# Patient Record
Sex: MALE | Race: BLACK OR AFRICAN AMERICAN | NOT HISPANIC OR LATINO | Employment: UNEMPLOYED | URBAN - METROPOLITAN AREA
[De-identification: names, ages, dates, MRNs, and addresses within clinical notes are randomized per-mention and may not be internally consistent; named-entity substitution may affect disease eponyms.]

---

## 2017-02-14 ENCOUNTER — OFFICE VISIT (OUTPATIENT)
Dept: FAMILY MEDICINE | Facility: CLINIC | Age: 37
End: 2017-02-14
Payer: COMMERCIAL

## 2017-02-14 ENCOUNTER — LAB VISIT (OUTPATIENT)
Dept: LAB | Facility: HOSPITAL | Age: 37
End: 2017-02-14
Attending: INTERNAL MEDICINE
Payer: COMMERCIAL

## 2017-02-14 VITALS
TEMPERATURE: 97 F | SYSTOLIC BLOOD PRESSURE: 130 MMHG | HEIGHT: 68 IN | BODY MASS INDEX: 34.01 KG/M2 | HEART RATE: 100 BPM | WEIGHT: 224.44 LBS | DIASTOLIC BLOOD PRESSURE: 82 MMHG | OXYGEN SATURATION: 98 % | RESPIRATION RATE: 18 BRPM

## 2017-02-14 DIAGNOSIS — B00.9 HSV-2 INFECTION: ICD-10-CM

## 2017-02-14 DIAGNOSIS — Z00.00 ROUTINE ADULT HEALTH MAINTENANCE: ICD-10-CM

## 2017-02-14 DIAGNOSIS — R20.0 BILATERAL HAND NUMBNESS: Primary | ICD-10-CM

## 2017-02-14 DIAGNOSIS — R20.0 BILATERAL HAND NUMBNESS: ICD-10-CM

## 2017-02-14 LAB
ABO + RH BLD: NORMAL
ALBUMIN SERPL BCP-MCNC: 4 G/DL
ALP SERPL-CCNC: 75 U/L
ALT SERPL W/O P-5'-P-CCNC: 24 U/L
ANION GAP SERPL CALC-SCNC: 4 MMOL/L
AST SERPL-CCNC: 30 U/L
BASOPHILS # BLD AUTO: 0.01 K/UL
BASOPHILS NFR BLD: 0.2 %
BILIRUB SERPL-MCNC: 0.4 MG/DL
BLD GP AB SCN CELLS X3 SERPL QL: NORMAL
BUN SERPL-MCNC: 22 MG/DL
CALCIUM SERPL-MCNC: 9.1 MG/DL
CHLORIDE SERPL-SCNC: 108 MMOL/L
CHOLEST/HDLC SERPL: 4.6 {RATIO}
CO2 SERPL-SCNC: 28 MMOL/L
CREAT SERPL-MCNC: 1.3 MG/DL
DIFFERENTIAL METHOD: ABNORMAL
EOSINOPHIL # BLD AUTO: 0.1 K/UL
EOSINOPHIL NFR BLD: 1.9 %
ERYTHROCYTE [DISTWIDTH] IN BLOOD BY AUTOMATED COUNT: 13.4 %
EST. GFR  (AFRICAN AMERICAN): >60 ML/MIN/1.73 M^2
EST. GFR  (NON AFRICAN AMERICAN): >60 ML/MIN/1.73 M^2
GLUCOSE SERPL-MCNC: 101 MG/DL
HCT VFR BLD AUTO: 38.4 %
HDL/CHOLESTEROL RATIO: 21.8 %
HDLC SERPL-MCNC: 193 MG/DL
HDLC SERPL-MCNC: 42 MG/DL
HGB BLD-MCNC: 13.1 G/DL
LDLC SERPL CALC-MCNC: 97.8 MG/DL
LYMPHOCYTES # BLD AUTO: 2.5 K/UL
LYMPHOCYTES NFR BLD: 46.4 %
MCH RBC QN AUTO: 30 PG
MCHC RBC AUTO-ENTMCNC: 34.1 %
MCV RBC AUTO: 88 FL
MONOCYTES # BLD AUTO: 0.7 K/UL
MONOCYTES NFR BLD: 13.5 %
NEUTROPHILS # BLD AUTO: 2 K/UL
NEUTROPHILS NFR BLD: 37.8 %
NONHDLC SERPL-MCNC: 151 MG/DL
PLATELET # BLD AUTO: 213 K/UL
PMV BLD AUTO: 12.3 FL
POTASSIUM SERPL-SCNC: 4 MMOL/L
PROT SERPL-MCNC: 7.8 G/DL
RBC # BLD AUTO: 4.37 M/UL
SODIUM SERPL-SCNC: 140 MMOL/L
TRIGL SERPL-MCNC: 266 MG/DL
WBC # BLD AUTO: 5.39 K/UL

## 2017-02-14 PROCEDURE — 83036 HEMOGLOBIN GLYCOSYLATED A1C: CPT

## 2017-02-14 PROCEDURE — 36415 COLL VENOUS BLD VENIPUNCTURE: CPT | Mod: PO

## 2017-02-14 PROCEDURE — 99395 PREV VISIT EST AGE 18-39: CPT | Mod: S$GLB,,, | Performed by: INTERNAL MEDICINE

## 2017-02-14 PROCEDURE — 86900 BLOOD TYPING SEROLOGIC ABO: CPT

## 2017-02-14 PROCEDURE — 80061 LIPID PANEL: CPT

## 2017-02-14 PROCEDURE — 80053 COMPREHEN METABOLIC PANEL: CPT

## 2017-02-14 PROCEDURE — 85025 COMPLETE CBC W/AUTO DIFF WBC: CPT

## 2017-02-14 PROCEDURE — 99999 PR PBB SHADOW E&M-EST. PATIENT-LVL III: CPT | Mod: PBBFAC,,, | Performed by: INTERNAL MEDICINE

## 2017-02-14 PROCEDURE — 86901 BLOOD TYPING SEROLOGIC RH(D): CPT

## 2017-02-14 PROCEDURE — 86703 HIV-1/HIV-2 1 RESULT ANTBDY: CPT

## 2017-02-14 RX ORDER — VALACYCLOVIR HYDROCHLORIDE 1 G/1
1000 TABLET, FILM COATED ORAL DAILY PRN
Qty: 30 TABLET | Refills: 2 | Status: SHIPPED | OUTPATIENT
Start: 2017-02-14 | End: 2017-03-29

## 2017-02-14 RX ORDER — MELOXICAM 15 MG/1
15 TABLET ORAL DAILY PRN
Qty: 30 TABLET | Refills: 0 | Status: SHIPPED | OUTPATIENT
Start: 2017-02-14 | End: 2017-03-29

## 2017-02-14 NOTE — PROGRESS NOTES
Subjective:       Patient ID: Sukumar Bob is a 36 y.o. male.    Chief Complaint: Annual Exam  -----physical---recurrence of bilat hand numbness---------works at plant using hands-------in turn around now---------  HPI  Review of Systems   Constitutional: Negative for activity change, appetite change, chills, diaphoresis, fatigue, fever and unexpected weight change.   HENT: Negative for drooling, ear discharge, ear pain, facial swelling, hearing loss, mouth sores, nosebleeds, postnasal drip, rhinorrhea, sinus pressure, sneezing, sore throat, tinnitus, trouble swallowing and voice change.    Eyes: Negative for photophobia, redness and visual disturbance.   Respiratory: Negative for apnea, cough, choking, chest tightness, shortness of breath and wheezing.    Cardiovascular: Negative for chest pain, palpitations and leg swelling.   Gastrointestinal: Negative for abdominal distention, abdominal pain, anal bleeding, blood in stool, constipation, diarrhea, nausea, rectal pain and vomiting.   Endocrine: Negative for cold intolerance, heat intolerance, polydipsia, polyphagia and polyuria.   Genitourinary: Negative for difficulty urinating, dysuria, enuresis, flank pain, frequency, genital sores, hematuria and urgency.   Musculoskeletal: Negative for arthralgias, back pain, gait problem, joint swelling, myalgias, neck pain and neck stiffness.   Skin: Negative for color change, pallor, rash and wound.   Allergic/Immunologic: Negative for food allergies and immunocompromised state.   Neurological: Negative for dizziness, tremors, seizures, syncope, facial asymmetry, speech difficulty, weakness, light-headedness and headaches.   Hematological: Negative for adenopathy. Does not bruise/bleed easily.   Psychiatric/Behavioral: Negative for agitation, behavioral problems, confusion, decreased concentration, dysphoric mood, hallucinations, self-injury, sleep disturbance and suicidal ideas. The patient is not  nervous/anxious and is not hyperactive.        Objective:      Physical Exam   Constitutional: He is oriented to person, place, and time. He appears well-developed and well-nourished. No distress.   HENT:   Head: Normocephalic and atraumatic.   Eyes: Pupils are equal, round, and reactive to light. No scleral icterus.   Neck: Normal range of motion. Neck supple. No JVD present. Carotid bruit is not present. No tracheal deviation present. No thyromegaly present.   Cardiovascular: Normal rate, regular rhythm, normal heart sounds and intact distal pulses.    Pulmonary/Chest: Effort normal and breath sounds normal. No respiratory distress. He has no wheezes. He has no rales. He exhibits no tenderness.   Abdominal: Soft. Bowel sounds are normal. He exhibits no distension. There is no tenderness. There is no rebound and no guarding.   Musculoskeletal: Normal range of motion. He exhibits no edema or tenderness.   Lymphadenopathy:     He has no cervical adenopathy.   Neurological: He is alert and oriented to person, place, and time.   Skin: Skin is warm and dry. No rash noted. He is not diaphoretic. No erythema. No pallor.   Psychiatric: He has a normal mood and affect. His behavior is normal. Judgment and thought content normal.   Nursing note and vitals reviewed.      Assessment:       1. Bilateral hand numbness    2. HSV-2 infection    3. Routine adult health maintenance        Plan:        mobic 15 mg a day, bilat wrist splints-------call if persists-------consider emg.                 Watch diet,exercise.          Notes/labs reviewed.              Check cmp,cbc,lipids,hga1c,hiv.           F/u prn or 6 months.

## 2017-02-14 NOTE — MR AVS SNAPSHOT
[facility]  [address]  [place] [address]  Phone: [phone]                  Sukumar Bob   2017 4:00 PM   Office Visit    Description:  Male : 1980   Provider:  Jet Jefferson MD   Department:  [facility]           Reason for Visit     Annual Exam           Diagnoses this Visit        Comments    Bilateral hand numbness    -  Primary     HSV-2 infection         Routine adult health maintenance                To Do List           Future Appointments        Provider Department Dept Phone    2017 4:30 PM Jet Jefferson MD [facility] [phone]      Goals (5 Years of Data)     None      Follow-Up and Disposition     Return in about 6 months (around 2017).       These Medications        Disp Refills Start End    meloxicam (MOBIC) 15 MG tablet 30 tablet 0 2017     Take 1 tablet (15 mg total) by mouth daily as needed for Pain. - Oral    Pharmacy: [facility]/pharmacy #[id number] - [place], [place] - [address] [address] Ph #: [phone]       valacyclovir (VALTREX) 1000 MG tablet 30 tablet 2 2017     Take 1 tablet (1,000 mg total) by mouth daily as needed. - Oral    Pharmacy: [facility]/pharmacy #[id number] - [place] - [address] [address] Ph #: [phone]         [facility]     [facility] On Call Nurse Care Line -  Assistance  Registered nurses in the [facility] provide clinical advisement, health education, appointment booking, and other advisory services.  Call for this free service at [phone].             Medications           Message regarding Medications     Verify the changes and/or additions to your medication regime listed below are the same as discussed with your clinician today.  If any of these changes or additions are incorrect, please notify your healthcare provider.        CHANGE how you are taking these medications     Start Taking Instead of  "   meloxicam (MOBIC) 15 MG tablet meloxicam (MOBIC) 15 MG tablet    Dosage:  Take 1 tablet (15 mg total) by mouth daily as needed for Pain. Dosage:  Take 1 tablet (15 mg total) by mouth once daily.    Reason for Change:  Reorder     valacyclovir (VALTREX) 1000 MG tablet valacyclovir (VALTREX) 1000 MG tablet    Dosage:  Take 1 tablet (1,000 mg total) by mouth daily as needed. Dosage:  Take 1 tablet (1,000 mg total) by mouth once daily.    Reason for Change:  Reorder            Verify that the below list of medications is an accurate representation of the medications you are currently taking.  If none reported, the list may be blank. If incorrect, please contact your healthcare provider. Carry this list with you in case of emergency.           Current Medications     valacyclovir (VALTREX) 1000 MG tablet Take 1 tablet (1,000 mg total) by mouth daily as needed.    meloxicam (MOBIC) 15 MG tablet Take 1 tablet (15 mg total) by mouth daily as needed for Pain.           Clinical Reference Information           Your Vitals Were     BP Pulse Temp Resp    130/82 (BP Location: Right arm, Patient Position: Sitting, BP Method: Manual) 100 97.2 °F (36.2 °C) (Tympanic) 18    Height Weight SpO2 BMI    5' 8" (1.727 m) 101.8 kg (224 lb 6.9 oz) 98% 34.12 kg/m2      Blood Pressure          Most Recent Value    BP  130/82      Allergies as of 2/14/2017     No Known Allergies      Immunizations Administered on Date of Encounter - 2/14/2017     None      Orders Placed During Today's Visit     Future Labs/Procedures Expected by Expires    CBC auto differential  2/14/2017 4/15/2018    Comprehensive metabolic panel  2/14/2017 4/15/2018    Hemoglobin A1c  2/14/2017 4/15/2018    HIV-1 and HIV-2 antibodies  2/14/2017 4/15/2018    Lipid panel  2/14/2017 4/15/2018    TYPE & SCREEN  2/14/2017 4/15/2018      [facility] Sign-Up     Activating your [facility] account is as easy as 1-2-3!     1) Visit [email or web address], select Sign Up Now, enter this " activation code and your date of birth, then select Next.  Activation code not generated  Current Patient Portal Status: Account disabled      2) Create a username and password to use when you visit [facility] in the future and select a security question in case you lose your password and select Next.    3) Enter your e-mail address and click Sign Up!    Additional Information  If you have questions, please e-mail [email or web address] or call [phone] to talk to our [facility] staff. Remember, [facility] is NOT to be used for urgent needs. For medical emergencies, dial 911.         Language Assistance Services     ATTENTION: Language assistance services are available, free of charge. Please call [phone].      ATENCIÓN: Si fara katarzyna, tiene a rivas disposición servicios gratuitos de asistencia lingüística. Llame al [phone].     CHÚ Ý: N?u b?n nói Ti?ng Vi?t, có các d?ch v? h? tr? ngôn ng? mi?n phí dành cho b?n. G?i s? [phone].         [facility] complies with applicable Federal civil rights laws and does not discriminate on the basis of race, color, national origin, age, disability, or sex.

## 2017-02-15 LAB
ESTIMATED AVG GLUCOSE: 114 MG/DL
HBA1C MFR BLD HPLC: 5.6 %
HIV 1+2 AB+HIV1 P24 AG SERPL QL IA: NEGATIVE

## 2017-03-29 ENCOUNTER — OFFICE VISIT (OUTPATIENT)
Dept: FAMILY MEDICINE | Facility: CLINIC | Age: 37
End: 2017-03-29
Payer: COMMERCIAL

## 2017-03-29 VITALS
DIASTOLIC BLOOD PRESSURE: 90 MMHG | OXYGEN SATURATION: 99 % | HEIGHT: 69 IN | WEIGHT: 225.06 LBS | BODY MASS INDEX: 33.34 KG/M2 | RESPIRATION RATE: 18 BRPM | SYSTOLIC BLOOD PRESSURE: 120 MMHG | HEART RATE: 89 BPM | TEMPERATURE: 97 F

## 2017-03-29 DIAGNOSIS — J06.9 UPPER RESPIRATORY TRACT INFECTION, UNSPECIFIED TYPE: Primary | ICD-10-CM

## 2017-03-29 PROCEDURE — 96372 THER/PROPH/DIAG INJ SC/IM: CPT | Mod: S$GLB,,, | Performed by: FAMILY MEDICINE

## 2017-03-29 PROCEDURE — 1160F RVW MEDS BY RX/DR IN RCRD: CPT | Mod: S$GLB,,, | Performed by: FAMILY MEDICINE

## 2017-03-29 PROCEDURE — 99999 PR PBB SHADOW E&M-EST. PATIENT-LVL III: CPT | Mod: PBBFAC,,, | Performed by: FAMILY MEDICINE

## 2017-03-29 PROCEDURE — 99214 OFFICE O/P EST MOD 30 MIN: CPT | Mod: 25,S$GLB,, | Performed by: FAMILY MEDICINE

## 2017-03-29 RX ORDER — BETAMETHASONE SODIUM PHOSPHATE AND BETAMETHASONE ACETATE 3; 3 MG/ML; MG/ML
12 INJECTION, SUSPENSION INTRA-ARTICULAR; INTRALESIONAL; INTRAMUSCULAR; SOFT TISSUE
Status: COMPLETED | OUTPATIENT
Start: 2017-03-29 | End: 2017-03-29

## 2017-03-29 RX ORDER — PROMETHAZINE HYDROCHLORIDE AND DEXTROMETHORPHAN HYDROBROMIDE 6.25; 15 MG/5ML; MG/5ML
5 SYRUP ORAL EVERY 8 HOURS PRN
Qty: 240 ML | Refills: 0 | Status: SHIPPED | OUTPATIENT
Start: 2017-03-29 | End: 2018-01-02 | Stop reason: ALTCHOICE

## 2017-03-29 RX ORDER — FLUTICASONE PROPIONATE 50 MCG
2 SPRAY, SUSPENSION (ML) NASAL DAILY
Qty: 16 G | Refills: 0 | Status: SHIPPED | OUTPATIENT
Start: 2017-03-29 | End: 2018-01-02

## 2017-03-29 RX ADMIN — BETAMETHASONE SODIUM PHOSPHATE AND BETAMETHASONE ACETATE 12 MG: 3; 3 INJECTION, SUSPENSION INTRA-ARTICULAR; INTRALESIONAL; INTRAMUSCULAR; SOFT TISSUE at 04:03

## 2017-03-29 NOTE — PROGRESS NOTES
Subjective:       Patient ID: Sukumar Bob is a 36 y.o. male.    Chief Complaint: Cough (Congestion)      HPI   Mr. Bob presents to clinic today for complaints of fever, cough , congestion and runny nose.   He states he went to urgent care on 3/25 and was given doxycycline and Zpak.   He states he is still feeling bad and has never felt this bad.   He feels his nose is stopped up and he is coughing all day.   He has not been around any sick contacts.   He does not smoke.     Review of Systems   Constitutional: Positive for fever.   HENT: Positive for congestion and rhinorrhea. Negative for sore throat.    Respiratory: Positive for cough.    Gastrointestinal: Negative for abdominal pain, nausea and vomiting.   Genitourinary: Negative for dysuria and hematuria.   Neurological: Negative for dizziness and headaches.       Medication List with Changes/Refills   Discontinued Medications    MELOXICAM (MOBIC) 15 MG TABLET    Take 1 tablet (15 mg total) by mouth daily as needed for Pain.    VALACYCLOVIR (VALTREX) 1000 MG TABLET    Take 1 tablet (1,000 mg total) by mouth daily as needed.       Patient Active Problem List   Diagnosis    HSV-2 infection    Right elbow pain    Left wrist pain    Bilateral hand numbness    Primary osteoarthritis involving multiple joints    Right shoulder pain         Objective:     Physical Exam   Constitutional: He is oriented to person, place, and time. He appears well-developed and well-nourished. No distress.   HENT:   Head: Normocephalic and atraumatic.   Right Ear: External ear normal.   Left Ear: External ear normal.   Mouth/Throat: Oropharynx is clear and moist.   Turbinate hypertrophy   Appears congested    Eyes: EOM are normal. Right eye exhibits no discharge. Left eye exhibits no discharge.   Cardiovascular: Normal rate and regular rhythm.    Pulmonary/Chest: Effort normal. No respiratory distress. He has no wheezes.   Mildly coarse breath sounds    Musculoskeletal: He exhibits no edema.   Neurological: He is alert and oriented to person, place, and time.   Skin: Skin is warm and dry. He is not diaphoretic. No erythema.   Psychiatric: He has a normal mood and affect.   Vitals reviewed.    Vitals:    03/29/17 1605   BP: (!) 120/90   Pulse: 89   Resp: 18   Temp: 97.1 °F (36.2 °C)       Assessment/  PLAN     Upper respiratory tract infection, unspecified type  -     betamethasone acetate-betamethasone sodium phosphate injection 12 mg; Inject 2 mLs (12 mg total) into the muscle one time.  -     fluticasone (FLONASE) 50 mcg/actuation nasal spray; 2 sprays by Each Nare route once daily.  Dispense: 16 g; Refill: 0  -     promethazine-dextromethorphan (PROMETHAZINE-DM) 6.25-15 mg/5 mL Syrp; Take 5 mLs by mouth every 8 (eight) hours as needed.  Dispense: 240 mL; Refill: 0  - continue supportive care, rest and hydration     Plan as above   rtc prn         Terri Mott MD  [facility]

## 2017-03-29 NOTE — MR AVS SNAPSHOT
[facility]  [address] [address]  Phone: [phone]                  Sukumar Bob   3/29/2017 4:20 PM   Office Visit    Description:  Male : 1980   Provider:  Terri Mott MD   Department:  [facility]           Reason for Visit     Cough           Diagnoses this Visit        Comments    Upper respiratory tract infection, unspecified type    -  Primary            To Do List           Future Appointments        Provider Department Dept Phone    2017 4:30 PM Jet Jefferson MD [facility] [phone]      Goals (5 Years of Data)     None       These Medications        Disp Refills Start End    fluticasone (FLONASE) 50 mcg/actuation nasal spray 16 g 0 3/29/2017     2 sprays by Each Nare route once daily. - Each Nare    Pharmacy: [facility]/pharmacy #[id number] - [place] - [address] [address] Ph #: [phone]       promethazine-dextromethorphan (PROMETHAZINE-DM) 6.25-15 mg/5 mL Syrp 240 mL 0 3/29/2017     Take 5 mLs by mouth every 8 (eight) hours as needed. - Oral    Pharmacy: [facility]/pharmacy #[id number] - [place], [place] - [address] [address] Ph #: [phone]         [facility]     [facility] On Call Nurse Care Line -  Assistance  Registered nurses in the [facility] provide clinical advisement, health education, appointment booking, and other advisory services.  Call for this free service at [phone].             Medications           Message regarding Medications     Verify the changes and/or additions to your medication regime listed below are the same as discussed with your clinician today.  If any of these changes or additions are incorrect, please notify your healthcare provider.        START taking these NEW medications        Refills    fluticasone (FLONASE) 50 mcg/actuation nasal spray 0    Si sprays by Each Nare route once daily.    Class: Normal    Route: Each  "Nare    promethazine-dextromethorphan (PROMETHAZINE-DM) 6.25-15 mg/5 mL Syrp 0    Sig: Take 5 mLs by mouth every 8 (eight) hours as needed.    Class: Normal    Route: Oral      These medications were administered today        Dose Freq    betamethasone acetate-betamethasone sodium phosphate injection 12 mg 12 mg [facility] 1 time    Sig: Inject 2 mLs (12 mg total) into the muscle one time.    Class: Normal    Route: Intramuscular      STOP taking these medications     meloxicam (MOBIC) 15 MG tablet Take 1 tablet (15 mg total) by mouth daily as needed for Pain.    valacyclovir (VALTREX) 1000 MG tablet Take 1 tablet (1,000 mg total) by mouth daily as needed.           Verify that the below list of medications is an accurate representation of the medications you are currently taking.  If none reported, the list may be blank. If incorrect, please contact your healthcare provider. Carry this list with you in case of emergency.           Current Medications     fluticasone (FLONASE) 50 mcg/actuation nasal spray 2 sprays by Each Nare route once daily.    promethazine-dextromethorphan (PROMETHAZINE-DM) 6.25-15 mg/5 mL Syrp Take 5 mLs by mouth every 8 (eight) hours as needed.           Clinical Reference Information           Your Vitals Were     BP Pulse Temp Resp Height Weight    120/90 89 97.1 °F (36.2 °C) 18 5' 9" (1.753 m) 102.1 kg (225 lb 1.4 oz)    SpO2 BMI             99% 33.24 kg/m2         Blood Pressure          Most Recent Value    BP  (!)  120/90      Allergies as of 3/29/2017     No Known Allergies      Immunizations Administered on Date of Encounter - 3/29/2017     None      [facility] Sign-Up     Activating your [facility] account is as easy as 1-2-3!     1) Visit [email or web address], select Sign Up Now, enter this activation code and your date of birth, then select Next.  Activation code not generated  Current Patient Portal Status: Account disabled      2) Create a username and password to use when you visit " [facility] in the future and select a security question in case you lose your password and select Next.    3) Enter your e-mail address and click Sign Up!    Additional Information  If you have questions, please e-mail [email or web address] or call [phone] to talk to our [facility] staff. Remember, [facility] is NOT to be used for urgent needs. For medical emergencies, dial 911.         Language Assistance Services     ATTENTION: Language assistance services are available, free of charge. Please call [phone].      ATENCIÓN: Si habla español, tiene a rivas disposición servicios gratuitos de asistencia lingüística. Llame al [phone].     CHÚ Ý: N?u b?n nói Ti?ng Vi?t, có các d?ch v? h? tr? ngôn ng? mi?n phí dành cho b?n. G?i s? [phone].         [facility] complies with applicable Federal civil rights laws and does not discriminate on the basis of race, color, national origin, age, disability, or sex.

## 2017-04-11 ENCOUNTER — OFFICE VISIT (OUTPATIENT)
Dept: FAMILY MEDICINE | Facility: CLINIC | Age: 37
End: 2017-04-11
Payer: COMMERCIAL

## 2017-04-11 ENCOUNTER — LAB VISIT (OUTPATIENT)
Dept: LAB | Facility: HOSPITAL | Age: 37
End: 2017-04-11
Payer: COMMERCIAL

## 2017-04-11 VITALS
RESPIRATION RATE: 18 BRPM | BODY MASS INDEX: 34.35 KG/M2 | HEIGHT: 68 IN | TEMPERATURE: 97 F | OXYGEN SATURATION: 98 % | SYSTOLIC BLOOD PRESSURE: 126 MMHG | WEIGHT: 226.63 LBS | DIASTOLIC BLOOD PRESSURE: 80 MMHG | HEART RATE: 98 BPM

## 2017-04-11 DIAGNOSIS — N45.1 EPIDIDYMITIS: ICD-10-CM

## 2017-04-11 DIAGNOSIS — N45.1 EPIDIDYMITIS: Primary | ICD-10-CM

## 2017-04-11 PROCEDURE — 81003 URINALYSIS AUTO W/O SCOPE: CPT

## 2017-04-11 PROCEDURE — 87086 URINE CULTURE/COLONY COUNT: CPT

## 2017-04-11 PROCEDURE — 99213 OFFICE O/P EST LOW 20 MIN: CPT | Mod: S$GLB,,, | Performed by: INTERNAL MEDICINE

## 2017-04-11 PROCEDURE — 99999 PR PBB SHADOW E&M-EST. PATIENT-LVL III: CPT | Mod: PBBFAC,,, | Performed by: INTERNAL MEDICINE

## 2017-04-11 PROCEDURE — 87591 N.GONORRHOEAE DNA AMP PROB: CPT

## 2017-04-11 PROCEDURE — 1160F RVW MEDS BY RX/DR IN RCRD: CPT | Mod: S$GLB,,, | Performed by: INTERNAL MEDICINE

## 2017-04-11 RX ORDER — CIPROFLOXACIN 500 MG/1
500 TABLET ORAL 2 TIMES DAILY
Qty: 14 TABLET | Refills: 0 | Status: SHIPPED | OUTPATIENT
Start: 2017-04-11 | End: 2017-04-18

## 2017-04-11 NOTE — PROGRESS NOTES
Subjective:       Patient ID: Sukumar Bob is a 37 y.o. male.    Chief Complaint: Testicle Pain  ----4 days feeling of burning in testicles and occasional penile shaft.---started during sex past Saturday .  No urine symptoms .  No penile d/c.  No rash. No ulcers.        HPI  Review of Systems   Constitutional: Negative for chills and fever.   HENT: Negative.    Respiratory: Negative for apnea, cough, choking, chest tightness, shortness of breath, wheezing and stridor.    Gastrointestinal: Negative for abdominal pain, nausea and vomiting.   Genitourinary: Positive for testicular pain. Negative for difficulty urinating, discharge, dysuria, frequency, genital sores, hematuria, penile swelling, scrotal swelling and urgency.   Neurological: Negative for dizziness, facial asymmetry and light-headedness.   Psychiatric/Behavioral: Negative for agitation, behavioral problems and confusion.       Objective:      Physical Exam   Constitutional: He is oriented to person, place, and time. He appears well-developed and well-nourished. No distress.   HENT:   Head: Normocephalic and atraumatic.   Eyes: Pupils are equal, round, and reactive to light.   Neck: Normal range of motion. Neck supple. Carotid bruit is not present.   Cardiovascular: Normal rate, regular rhythm, normal heart sounds and intact distal pulses.    Pulmonary/Chest: Effort normal and breath sounds normal. No respiratory distress. He has no wheezes. He has no rales. He exhibits no tenderness.   Abdominal: Soft. Bowel sounds are normal.   Genitourinary: Penis normal. No penile tenderness.   Genitourinary Comments: No rash or ulcers.     Non tender.   Musculoskeletal: Normal range of motion. He exhibits no edema or tenderness.   Neurological: He is alert and oriented to person, place, and time. No cranial nerve deficit. Coordination normal.   Skin: Skin is warm and dry. No rash noted. He is not diaphoretic. No erythema. No pallor.   Psychiatric: He has a  normal mood and affect. His behavior is normal. Judgment and thought content normal.   Nursing note and vitals reviewed.      Assessment:       1. Epididymitis        Plan:         cipro 500 mg bid x 7 days.        Check u/a and culture,gc/chlamydia,u/s scrotum.--------call if persists.

## 2017-04-11 NOTE — MR AVS SNAPSHOT
[facility]  [address] [address]  Phone: [phone]                  Sukumar Bob   2017 3:30 PM   Office Visit    Description:  Male : 1980   Provider:  Jet Jefferson MD   Department:  [facility]           Reason for Visit     Testicle Pain           Diagnoses this Visit        Comments    Epididymitis    -  Primary            To Do List           Future Appointments        Provider Department Dept Phone    2017 4:10 PM [facility], [address] [facility]  [phone]    2017 3:30 PM [facility] [phone]    2017 4:30 PM Jet Jefferson MD [facility] [phone]      Goals (5 Years of Data)     None      Follow-Up and Disposition     Return if symptoms worsen or fail to improve.       These Medications        Disp Refills Start End    ciprofloxacin HCl (CIPRO) 500 MG tablet 14 tablet 0 2017    Take 1 tablet (500 mg total) by mouth 2 (two) times daily. - Oral    Pharmacy: [facility]/pharmacy #[id number] - [place], [place] - [address] [address] #: [phone]         [facility]     [facility] Care [facility] - 24 Assistance  Unless otherwise directed by your provider, please contact [facility], our nurse care line that is available for / assistance.     Registered nurses in the [facility] provide: appointment scheduling, clinical advisement, health education, and other advisory services.  Call: [phone] (toll free)               Medications           Message regarding Medications     Verify the changes and/or additions to your medication regime listed below are the same as discussed with your clinician today.  If any of these changes or additions are incorrect, please notify your healthcare provider.        START taking these NEW medications        Refills     "ciprofloxacin HCl (CIPRO) 500 MG tablet 0    Sig: Take 1 tablet (500 mg total) by mouth 2 (two) times daily.    Class: Normal    Route: Oral           Verify that the below list of medications is an accurate representation of the medications you are currently taking.  If none reported, the list may be blank. If incorrect, please contact your healthcare provider. Carry this list with you in case of emergency.           Current Medications     ciprofloxacin HCl (CIPRO) 500 MG tablet Take 1 tablet (500 mg total) by mouth 2 (two) times daily.    fluticasone (FLONASE) 50 mcg/actuation nasal spray 2 sprays by Each Nare route once daily.    promethazine-dextromethorphan (PROMETHAZINE-DM) 6.25-15 mg/5 mL Syrp Take 5 mLs by mouth every 8 (eight) hours as needed.           Clinical Reference Information           Your Vitals Were     BP Pulse Temp Resp    126/80 (BP Location: Left arm, Patient Position: Sitting, BP Method: Manual) 98 97.4 °F (36.3 °C) (Tympanic) 18    Height Weight SpO2 BMI    5' 8" (1.727 m) 102.8 kg (226 lb 10.1 oz) 98% 34.46 kg/m2      Blood Pressure          Most Recent Value    BP  126/80      Allergies as of 4/11/2017     No Known Allergies      Immunizations Administered on Date of Encounter - 4/11/2017     None      Orders Placed During Today's Visit      Normal Orders This Visit    C. trachomatis/N. gonorrhoeae by AMP DNA Urine     Future Labs/Procedures Expected by Expires    Urinalysis  4/11/2017 6/10/2018    Urine culture  4/11/2017 6/10/2018    US Scrotum And Testicles  4/11/2017 4/11/2018      [facility] Sign-Up     Activating your [facility] account is as easy as 1-2-3!     1) Visit [email or web address], select Sign Up Now, enter this activation code and your date of birth, then select Next.  Activation code not generated  Current Patient Portal Status: Account disabled      2) Create a username and password to use when you visit [facility] in the future and select a security question in case you lose " your password and select Next.    3) Enter your e-mail address and click Sign Up!    Additional Information  If you have questions, please e-mail [email or web address] or call [phone] to talk to our [facility] staff. Remember, [facility] is NOT to be used for urgent needs. For medical emergencies, dial 911.         Language Assistance Services     ATTENTION: Language assistance services are available, free of charge. Please call [phone].      ATENCIÓN: Si habla español, tiene a rivas disposición servicios gratuitos de asistencia lingüística. Llame al [phone].     CHÚ Ý: N?u b?n nói Ti?ng Vi?t, có các d?ch v? h? tr? ngôn ng? mi?n phí dành cho b?n. G?i s? [phone].         [facility] complies with applicable Federal civil rights laws and does not discriminate on the basis of race, color, national origin, age, disability, or sex.

## 2017-04-12 LAB
BACTERIA UR CULT: NO GROWTH
BILIRUB UR QL STRIP: NEGATIVE
C TRACH DNA SPEC QL NAA+PROBE: NOT DETECTED
CLARITY UR REFRACT.AUTO: CLEAR
COLOR UR AUTO: YELLOW
GLUCOSE UR QL STRIP: NEGATIVE
HGB UR QL STRIP: NEGATIVE
KETONES UR QL STRIP: NEGATIVE
LEUKOCYTE ESTERASE UR QL STRIP: NEGATIVE
N GONORRHOEA DNA SPEC QL NAA+PROBE: NOT DETECTED
NITRITE UR QL STRIP: NEGATIVE
PH UR STRIP: 6 [PH] (ref 5–8)
PROT UR QL STRIP: NEGATIVE
SP GR UR STRIP: 1.02 (ref 1–1.03)
URN SPEC COLLECT METH UR: NORMAL
UROBILINOGEN UR STRIP-ACNC: NEGATIVE EU/DL

## 2017-04-20 ENCOUNTER — TELEPHONE (OUTPATIENT)
Dept: RADIOLOGY | Facility: HOSPITAL | Age: 37
End: 2017-04-20

## 2017-05-18 RX ORDER — VALACYCLOVIR HYDROCHLORIDE 1 G/1
TABLET, FILM COATED ORAL
Qty: 30 TABLET | Refills: 0 | Status: SHIPPED | OUTPATIENT
Start: 2017-05-18 | End: 2017-10-05 | Stop reason: SDUPTHER

## 2017-07-05 ENCOUNTER — NURSE TRIAGE (OUTPATIENT)
Dept: ADMINISTRATIVE | Facility: CLINIC | Age: 37
End: 2017-07-05

## 2017-07-06 NOTE — TELEPHONE ENCOUNTER
"  Reason for Disposition   Jock Itch suspected (i.e., itchy rash on inner thighs near genital area)   Rash is painful to touch     Sukumar describes a very painful rash to his groin, from the crease of his thigh to the buttocks. Began today; he was in the heat all day at work.  Hurts to touch.  Home care advice given, and recommended he be seen tomorrow, due to possible infection and the need to rule this out.  He declined.  Message to Jet Jefferson .  Please contact caller directly with any additional care advice.    Answer Assessment - Initial Assessment Questions  1. APPEARANCE of RASH: "Describe the rash."       It is painful.  I can't see it to describe it.    2. LOCATION: "Where is the rash located?"       It is in the groin.    3. ONSET: "When did the rash start?"       Today.    4. ITCHING: "Does the rash itch?" If so, ask: "How bad is the itch?"  (Scale 1-10; or mild, moderate, severe)      No, but it hurts    5. PAIN: "Does the rash hurt?" If so, ask: "How bad is the pain?"  (Scale 1-10; or mild, moderate, severe)      5 of 10.    6. OTHER SYMPTOMS: "Do you have any other symptoms?" (e.g., fever)      No.    7. PREGNANCY: "Is there any chance you are pregnant?" "When was your last menstrual period?"      n/a    Protocols used:  RASH OR REDNESS - [id number],  JOCK ITCH-A-    "

## 2017-10-05 RX ORDER — VALACYCLOVIR HYDROCHLORIDE 1 G/1
TABLET, FILM COATED ORAL
Qty: 30 TABLET | Refills: 0 | Status: SHIPPED | OUTPATIENT
Start: 2017-10-05 | End: 2018-01-02

## 2018-01-02 ENCOUNTER — OFFICE VISIT (OUTPATIENT)
Dept: FAMILY MEDICINE | Facility: CLINIC | Age: 38
End: 2018-01-02
Payer: COMMERCIAL

## 2018-01-02 ENCOUNTER — LAB VISIT (OUTPATIENT)
Dept: LAB | Facility: HOSPITAL | Age: 38
End: 2018-01-02
Attending: FAMILY MEDICINE
Payer: COMMERCIAL

## 2018-01-02 VITALS
HEIGHT: 68 IN | TEMPERATURE: 98 F | SYSTOLIC BLOOD PRESSURE: 118 MMHG | OXYGEN SATURATION: 98 % | BODY MASS INDEX: 33.84 KG/M2 | WEIGHT: 223.31 LBS | HEART RATE: 73 BPM | RESPIRATION RATE: 18 BRPM | DIASTOLIC BLOOD PRESSURE: 84 MMHG

## 2018-01-02 DIAGNOSIS — E66.9 OBESITY (BMI 30.0-34.9): ICD-10-CM

## 2018-01-02 DIAGNOSIS — Z00.00 ANNUAL PHYSICAL EXAM: Primary | ICD-10-CM

## 2018-01-02 DIAGNOSIS — Z00.00 ANNUAL PHYSICAL EXAM: ICD-10-CM

## 2018-01-02 LAB
ALBUMIN SERPL BCP-MCNC: 4 G/DL
ALP SERPL-CCNC: 69 U/L
ALT SERPL W/O P-5'-P-CCNC: 23 U/L
ANION GAP SERPL CALC-SCNC: 9 MMOL/L
AST SERPL-CCNC: 25 U/L
BASOPHILS # BLD AUTO: 0.03 K/UL
BASOPHILS NFR BLD: 0.5 %
BILIRUB SERPL-MCNC: 0.7 MG/DL
BILIRUB UR QL STRIP: NEGATIVE
BUN SERPL-MCNC: 27 MG/DL
CALCIUM SERPL-MCNC: 9 MG/DL
CHLORIDE SERPL-SCNC: 104 MMOL/L
CHOLEST SERPL-MCNC: 211 MG/DL
CHOLEST/HDLC SERPL: 4.3 {RATIO}
CLARITY UR REFRACT.AUTO: CLEAR
CO2 SERPL-SCNC: 27 MMOL/L
COLOR UR AUTO: YELLOW
CREAT SERPL-MCNC: 1.2 MG/DL
DIFFERENTIAL METHOD: ABNORMAL
EOSINOPHIL # BLD AUTO: 0.1 K/UL
EOSINOPHIL NFR BLD: 1.6 %
ERYTHROCYTE [DISTWIDTH] IN BLOOD BY AUTOMATED COUNT: 12.9 %
EST. GFR  (AFRICAN AMERICAN): >60 ML/MIN/1.73 M^2
EST. GFR  (NON AFRICAN AMERICAN): >60 ML/MIN/1.73 M^2
ESTIMATED AVG GLUCOSE: 105 MG/DL
GLUCOSE SERPL-MCNC: 82 MG/DL
GLUCOSE UR QL STRIP: NEGATIVE
HBA1C MFR BLD HPLC: 5.3 %
HCT VFR BLD AUTO: 39.2 %
HDLC SERPL-MCNC: 49 MG/DL
HDLC SERPL: 23.2 %
HGB BLD-MCNC: 13.2 G/DL
HGB UR QL STRIP: NEGATIVE
IMM GRANULOCYTES # BLD AUTO: 0.01 K/UL
IMM GRANULOCYTES NFR BLD AUTO: 0.2 %
KETONES UR QL STRIP: NEGATIVE
LDLC SERPL CALC-MCNC: 132.4 MG/DL
LEUKOCYTE ESTERASE UR QL STRIP: NEGATIVE
LYMPHOCYTES # BLD AUTO: 2.8 K/UL
LYMPHOCYTES NFR BLD: 45.3 %
MCH RBC QN AUTO: 29.9 PG
MCHC RBC AUTO-ENTMCNC: 33.7 G/DL
MCV RBC AUTO: 89 FL
MONOCYTES # BLD AUTO: 0.7 K/UL
MONOCYTES NFR BLD: 11.1 %
NEUTROPHILS # BLD AUTO: 2.6 K/UL
NEUTROPHILS NFR BLD: 41.3 %
NITRITE UR QL STRIP: NEGATIVE
NONHDLC SERPL-MCNC: 162 MG/DL
NRBC BLD-RTO: 0 /100 WBC
PH UR STRIP: 5 [PH] (ref 5–8)
PLATELET # BLD AUTO: 224 K/UL
PMV BLD AUTO: 11.7 FL
POTASSIUM SERPL-SCNC: 4 MMOL/L
PROT SERPL-MCNC: 7.9 G/DL
PROT UR QL STRIP: NEGATIVE
RBC # BLD AUTO: 4.41 M/UL
SODIUM SERPL-SCNC: 140 MMOL/L
SP GR UR STRIP: >=1.03 (ref 1–1.03)
TESTOST SERPL-MCNC: 366 NG/DL
TRIGL SERPL-MCNC: 148 MG/DL
TSH SERPL DL<=0.005 MIU/L-ACNC: 1.94 UIU/ML
URN SPEC COLLECT METH UR: ABNORMAL
UROBILINOGEN UR STRIP-ACNC: NEGATIVE EU/DL
WBC # BLD AUTO: 6.2 K/UL

## 2018-01-02 PROCEDURE — 99999 PR PBB SHADOW E&M-EST. PATIENT-LVL III: CPT | Mod: PBBFAC,,, | Performed by: FAMILY MEDICINE

## 2018-01-02 PROCEDURE — 84443 ASSAY THYROID STIM HORMONE: CPT

## 2018-01-02 PROCEDURE — 80053 COMPREHEN METABOLIC PANEL: CPT

## 2018-01-02 PROCEDURE — 99395 PREV VISIT EST AGE 18-39: CPT | Mod: S$GLB,,, | Performed by: FAMILY MEDICINE

## 2018-01-02 PROCEDURE — 81003 URINALYSIS AUTO W/O SCOPE: CPT

## 2018-01-02 PROCEDURE — 83036 HEMOGLOBIN GLYCOSYLATED A1C: CPT

## 2018-01-02 PROCEDURE — 85025 COMPLETE CBC W/AUTO DIFF WBC: CPT

## 2018-01-02 PROCEDURE — 86703 HIV-1/HIV-2 1 RESULT ANTBDY: CPT

## 2018-01-02 PROCEDURE — 36415 COLL VENOUS BLD VENIPUNCTURE: CPT | Mod: PO

## 2018-01-02 PROCEDURE — 80061 LIPID PANEL: CPT

## 2018-01-02 PROCEDURE — 84403 ASSAY OF TOTAL TESTOSTERONE: CPT

## 2018-01-02 NOTE — PROGRESS NOTES
HISTORY OF PRESENT ILLNESS: Mr. Bob comes in today fasting for annual wellness examination.  He reports no acute problems today.    END OF LIFE DECISION:  He does not have a living will and does desires life support.    SCREENINGS:       Cholesterol: February 14, 2017.     FFS/Colonoscopy: Never.      Prostate/PSA: Never.     Dexa Scan: Never.     Eye Exam: 2017 at [facility] [place].     PPD: Negative in the past.     Immunizations:  Td/Tdap - < 10 years ago per patient.                              Zostavax - N./A.                              Pneumovax - Never.                              Seasonal Flu - Never. He declines.          ROS:  GENERAL: No fever, chills, fatigue or unusual weight change. Appetite normal. Weight 102.8 kg (226 lb 10.1 oz) at April 11, 2017 visit. Exercises some and occasionally monitors diet.  SKIN: No rashes, itching, changes in mole, color or texture of skin or easy bruising.  HEAD: No headaches or recent head trauma.  EYES: No change in vision,   no pain, diplopia, redness or discharge.  EARS: Denies ear pain, discharge, vertigo or decreased hearing.  NOSE: No epistaxis or rhinitis. Nontender external nose.  MOUTH & THROAT: No hoarseness or change in voice. No excessive gum bleeding or mouth sores.  No sore throat.  NODES: Denies swollen glands.  CHEST: Denies BECKMAN, wheezing, cough, hemoptysis or sputum production.  CARDIOVASCULAR: Denies chest pain, No palpitations.  ABDOMEN: Denies diarrhea, constipation, nausea, vomiting, abdominal pain, or blood in stool.  GENITOURINARY: No flank pain, dysuria or hematuria.No nocturia or frequency. No lesions, pain or swelling in genital area. Performs monthly self testicular exam. Desires HIV and testosterone testing.  PERIPHERAL VASCULAR: No claudication or cyanosis  ENDOCRINE: Denies diabetes, thyroid or cholesterol problems.  HEME/LYMPH: Denies bleeding problems.  MUSCULOSKELETAL: No joint stiffness, pain or swelling. No  "edema.  NEUROLOGIC: No history of seizures, tremors, alteration of gait or coordination.  PSYCHIATRIC: Denies mood swings, depression, anxiety, homicidal or suicidal thoughts. Denies sleep problems.    PE:   VS:  /84 (BP Location: Right arm, Patient Position: Sitting, BP Method: Medium (Manual))   Pulse 73   Temp 97.7 °F (36.5 °C) (Oral)   Resp 18   Ht 5' 8" (1.727 m)   Wt 101.3 kg (223 lb 5.2 oz)   SpO2 98%   BMI 33.96 kg/m²   APPEARANCE:  Well nourished, well developed male, pleasant and obese, alert and oriented in no acute distress.    HEAD: Non tender . Full range of motion.  EYES: PERRL, conjunctiva pink, lids no edema.  EARS: External canal patent, no swelling or redness. TM's shiny and clear.  NOSE: Mucosa and turbinates pink, not swollen. No discharge  THROAT: No pharyngeal erythema or exudate. No stridor.    NECK: Supple, no mass, thyroid not enlarged. No carotid bruit.  NODES: No cervical, axillary or inguinal lymph node enlargement.  CHEST: Normal respiratory effort. Lungs clear to auscultation.  CARDIOVASCULAR: Normal S1, S2. No rubs, murmurs or gallops.No edema.Pedal pulses palpable bilaterally.  ABDOMEN: Bowel sounds present. Not distended. Soft. No tenderness, masses or organomegaly.  BREAST: Nontender, no asymmetry, nipple discharge, abnormal masses, nodules, lumps.  GENITALIA: Scrotum no lesions, masses, tenderness or swelling.  No penile lesions. No hernia.  RECTAL: Not examined.  MUSCULOSKELETAL: No joint deformities or stiffness. He is ambulatory without problems.  SKIN: No rashes or suspicious lesions, normal color and turgor. He has tattoos on both arms.  NEUROLOGIC:   Cranial Nerves: II-XII grossly intact.  DTR's: Knees, Ankles 2+ and equal bilaterally Gait & Posture: Normal gait and fine motion.  PSYCHIATRIC: Patient alert, oriented x 3. Mood/Affect normal without acute anxiety and depression noted.Judgement and insight-good as he makes appropriate decisions on today's " examination.    DIAGNOSIS:    ICD-10-CM ICD-9-CM    1. Annual physical exam Z00.00 V70.0 CBC auto differential      TSH      Comprehensive metabolic panel      Lipid panel      Urinalysis      HIV-1 and HIV-2 antibodies      Testosterone      Hemoglobin A1c   2. Obesity (BMI 30.0-34.9) E66.9 278.00      PLAN:   1. Age-appropriate counseling-appropriate low-sodium, low-cholesterol, low carbohydrate diet and exercise daily, monthly self testicular examination, annual wellness examination.  2. Patient advised to call for results.  3. Consider taking OTC MVI daily.  4. Follow up if problems.

## 2018-01-03 LAB — HIV 1+2 AB+HIV1 P24 AG SERPL QL IA: NEGATIVE

## 2018-01-04 ENCOUNTER — OFFICE VISIT (OUTPATIENT)
Dept: FAMILY MEDICINE | Facility: CLINIC | Age: 38
End: 2018-01-04
Payer: COMMERCIAL

## 2018-01-04 VITALS
RESPIRATION RATE: 17 BRPM | SYSTOLIC BLOOD PRESSURE: 126 MMHG | WEIGHT: 223.75 LBS | BODY MASS INDEX: 33.91 KG/M2 | TEMPERATURE: 98 F | OXYGEN SATURATION: 97 % | HEART RATE: 89 BPM | DIASTOLIC BLOOD PRESSURE: 82 MMHG | HEIGHT: 68 IN

## 2018-01-04 DIAGNOSIS — J10.1 INFLUENZA B: Primary | ICD-10-CM

## 2018-01-04 DIAGNOSIS — R05.9 COUGH: ICD-10-CM

## 2018-01-04 DIAGNOSIS — E78.00 HYPERCHOLESTEREMIA: ICD-10-CM

## 2018-01-04 LAB
CTP QC/QA: YES
FLUAV AG NPH QL: NEGATIVE
FLUBV AG NPH QL: POSITIVE

## 2018-01-04 PROCEDURE — 99999 PR PBB SHADOW E&M-EST. PATIENT-LVL III: CPT | Mod: PBBFAC,,, | Performed by: FAMILY MEDICINE

## 2018-01-04 PROCEDURE — 99214 OFFICE O/P EST MOD 30 MIN: CPT | Mod: S$GLB,,, | Performed by: FAMILY MEDICINE

## 2018-01-04 PROCEDURE — 87804 INFLUENZA ASSAY W/OPTIC: CPT | Mod: QW,S$GLB,, | Performed by: FAMILY MEDICINE

## 2018-01-04 RX ORDER — BENZONATATE 100 MG/1
100 CAPSULE ORAL 3 TIMES DAILY PRN
Qty: 30 CAPSULE | Refills: 0 | Status: SHIPPED | OUTPATIENT
Start: 2018-01-04 | End: 2018-01-14

## 2018-01-04 RX ORDER — OSELTAMIVIR PHOSPHATE 75 MG/1
75 CAPSULE ORAL 2 TIMES DAILY
Qty: 10 CAPSULE | Refills: 0 | Status: SHIPPED | OUTPATIENT
Start: 2018-01-04 | End: 2018-01-09

## 2018-01-04 NOTE — PATIENT INSTRUCTIONS
Lifestyle Changes to Control Cholesterol  You can control your cholesterol through diet, exercise, weight management, quitting smoking, stress management, and taking your medicines right. These things can also lower your risk for cardiovascular disease.    Eating healthy  Your healthcare provider will give you information on diet changes you may need to make. Your provider may recommend that you see a registered dietitian for help with diet changes. Changes may include:  · Cutting back on the amount of fat and cholesterol in your meals  · Eating less salt (sodium). This is especially important if you have high blood pressure.  · Eating more fresh vegetables and fruits  · Eating lean proteins such as fish, poultry, beans, and peas  · Eating less red meat and processed meats  · Using low-fat dairy products  · Using vegetable and nut oils in limited amounts  · Limiting how many sweets and processed foods like chips, cookies, and baked goods that you eat   · Limiting how many sugar-sweetened beverages you drink  · Limiting how often you eat out  Getting exercise  Regular exercise is a good way to help your body control cholesterol. Regular exercise can help in many ways. It can:  · Raise your good cholesterol  · Help lower your bad cholesterol  · Let blood flow better through your body  · Give more oxygen to your muscles and tissues  · Help you manage your weight  · Help your heart pump better  · Lower your blood pressure  Your healthcare provider may recommend that you get more physical activity if you haven't been active. Your provider may recommend that you get moderate to vigorous physical activity for at least 40 minutes each day. You should do this for at least 3 to 4 days each week. A few examples of moderate to vigorous activity are:  · Walking at a brisk pace. This is about 3 to 4 miles per hour.  · Jogging or running  · Swimming or water aerobics  · Hiking  · Dancing  · Martial arts  · Tennis  · Riding a  bicycle or stationary bike  · Dancing  Managing your weight  If you are overweight or obese, your healthcare provider will work with you to help you lose weight and lower your BMI (body mass index). Making diet changes and getting more physical activity can help. Changing your diet will help you lose weight more easily than adding exercise.  Quitting smoking  Smoking and other tobacco use can raise cholesterol and make it harder to control. Quitting is tough. But millions of people have given up tobacco for good. You can quit, too! Think about some of the reasons below to quit smoking. Do any of them make you think twice about your smoking habit?  Stop smoking because it:  · Keeps your cholesterol high, even if you make all the other changes youre supposed to  · Damages your body. It especially harms your heart, lungs, skin, and blood vessels.  · Makes you more likely to have a heart attack (acute myocardial infarction), stroke, or cancer  · Stains your teeth  · Makes your skin, clothes, and breath smell bad  · Costs a lot of money  Controlling stress   Learn ways to control stress. This will help you deal with stress in your home and work life. Controlling stress can greatly lower your risk of getting cardiovascular disease.  Making the most of medicines  Healthy eating and exercise are a good start to keeping your cholesterol down. But you may need some extra help from medicine. If your doctor prescribes medicine, be sure to take it exactly as directed. Remember:  · Tell your healthcare provider about all other medicines you take. This includes vitamins and herbs.  · Tell your healthcare provider if you have any side effects after starting to take a medicine. Examples of side effects to watch for include muscle aches, weakness, blurred vision, rust-colored urine, yellowing of eyes or skin (jaundice), and headache.  · Dont skip a dose or stop taking your medicine because you feel better or because  your cholesterol numbers go down. Never stop taking your medicine unless your healthcare provider has told you its OK.  · Ask your healthcare provider if you have any questions about your medicines.  High risk groups  Some people may need to take medicines called statins to control their cholesterol. This is in addition to eating a healthy diet and getting regular exercise.  Statins can help you stay healthy. They can also help prevent a heart attack or stroke. You may need to take a statin if you are in one of these groups:  · Adults who have had a heart attack or stroke. Or adults who have had peripheral vascular disease, a ministroke (transient ischemic attack), or stable or unstable angina. This group also includes people who have had a procedure to restore blood flow through a blocked artery. These procedures include percutaneous coronary intervention, angioplasty, stent, and open-heart bypass surgery.  · Adults who have diabetes. Or adults who are at higher risk of having a heart attack or stroke and have an LDL cholesterol level of 70 to 189 mg/dL  · Adults who are 21 years old or older and have an LDL cholesterol level of 190 mg/dL or higher.  If you are in a high-risk group, talk with your healthcare provider about your treatment goals. Make sure you understand why these goals are important, based on your own health history and your family history of heart disease or high cholesterol.  Make a plan to have regular cholesterol checks. You may need to fast before getting this test. Also ask your provider about any side effects your medicines may cause. Let your provider know about any side effects you have. You may need to take more than one medicine to reach the cholesterol goals that you and your provider decide on.  Date Last Reviewed: 10/1/2016  © 1443-9783 [employer]. [address] [place] [address], [place] [address]. All rights reserved. This information is not intended as a substitute for  professional medical care. Always follow your healthcare professional's instructions.

## 2018-01-04 NOTE — PROGRESS NOTES
Subjective:      Patient ID: Sukumar Bob is a 37 y.o. male.    Chief Complaint: Sore Throat and Cough      Past Medical History:   Diagnosis Date    HSV (herpes simplex virus) infection     Mild chronic anemia      Past Surgical History:   Procedure Laterality Date    none       Family History   Problem Relation Age of Onset    No Known Problems Mother     No Known Problems Father     No Known Problems Sister     No Known Problems Brother     Hypertension Maternal Grandmother     Stroke Maternal Grandmother     No Known Problems Sister     No Known Problems Brother     No Known Problems Son     No Known Problems Son     Throat cancer Cousin     Diabetes Neg Hx     Heart disease Neg Hx      Social History     Social History    Marital status: Single     Spouse name: N/A    Number of children: 2    Years of education: N/A     Occupational History          [employer]     Social History Main Topics    Smoking status: Never Smoker    Smokeless tobacco: Never Used    Alcohol use Yes      Comment: socially    Drug use: No    Sexual activity: Yes     Partners: Female     Other Topics Concern    Not on file     Social History Narrative    He wears seatbelt. He is expecting a daughter soon.       Current Outpatient Prescriptions:     benzonatate (TESSALON) 100 MG capsule, Take 1 capsule (100 mg total) by mouth 3 (three) times daily as needed for Cough., Disp: 30 capsule, Rfl: 0    oseltamivir (TAMIFLU) 75 MG capsule, Take 1 capsule (75 mg total) by mouth 2 (two) times daily., Disp: 10 capsule, Rfl: 0  Review of patient's allergies indicates:  No Known Allergies    Review of Systems   Constitutional: Negative for chills, fatigue and fever.   HENT: Positive for congestion, postnasal drip, rhinorrhea and sore throat.    Respiratory: Positive for cough. Negative for shortness of breath and wheezing.    Cardiovascular: Negative for chest pain and palpitations.  "  Gastrointestinal: Negative for abdominal pain and blood in stool.   Musculoskeletal: Positive for myalgias.     HPI  Reviewed labs - total cholesterol - 211.  Cough for 2nd day associated with sore throat, scratch throat, postnasal drip and achiness.    Objective:   /82 (BP Location: Left arm, Patient Position: Sitting, BP Method: Large (Manual))   Pulse 89   Temp 97.9 °F (36.6 °C) (Tympanic)   Resp 17   Ht 5' 8" (1.727 m)   Wt 101.5 kg (223 lb 12.3 oz)   SpO2 97%   BMI 34.02 kg/m²      Physical Exam   Constitutional: He is oriented to person, place, and time. He is cooperative. No distress.   HENT:   Right Ear: Hearing, tympanic membrane, external ear and ear canal normal.   Left Ear: Hearing, tympanic membrane, external ear and ear canal normal.   Mouth/Throat: Uvula is midline, oropharynx is clear and moist and mucous membranes are normal.   Eyes: Conjunctivae and EOM are normal.   Cardiovascular: Normal rate, regular rhythm and normal heart sounds.    Pulmonary/Chest: Effort normal and breath sounds normal.   Musculoskeletal: He exhibits no edema.   Neurological: He is alert and oriented to person, place, and time. Coordination and gait normal.   Skin: Skin is warm, dry and intact. No rash noted. He is not diaphoretic. No erythema.   Psychiatric: He has a normal mood and affect. His speech is normal and behavior is normal.   Nursing note and vitals reviewed.          Assessment:       1. Influenza B    2. Hypercholesteremia    3. Cough            Plan:         Influenza B  Comments:  Start tamiflu.   Tessalon perles prn cough.  Call pediatrician for prophylaaxis for 5 y/o.  Orders:  -     POCT Influenza A/B    Hypercholesteremia  Comments:  Enocuraged healthy diet and exercise.  Labs reviewed.    Cough    Other orders  -     oseltamivir (TAMIFLU) 75 MG capsule; Take 1 capsule (75 mg total) by mouth 2 (two) times daily.  Dispense: 10 capsule; Refill: 0  -     benzonatate (TESSALON) 100 MG capsule; " Take 1 capsule (100 mg total) by mouth 3 (three) times daily as needed for Cough.  Dispense: 30 capsule; Refill: 0        Patient Care Team:  Jet Jefferson MD as PCP - General (Internal Medicine)    Return if symptoms worsen or fail to improve.

## 2018-06-07 ENCOUNTER — TELEPHONE (OUTPATIENT)
Dept: FAMILY MEDICINE | Facility: CLINIC | Age: 38
End: 2018-06-07

## 2018-06-07 RX ORDER — VALACYCLOVIR HYDROCHLORIDE 1 G/1
1000 TABLET, FILM COATED ORAL 2 TIMES DAILY
Qty: 10 TABLET | Refills: 0 | Status: SHIPPED | OUTPATIENT
Start: 2018-06-07 | End: 2018-06-21 | Stop reason: SDUPTHER

## 2018-06-07 NOTE — TELEPHONE ENCOUNTER
----- Message from Leena Elise sent at 6/7/2018  3:17 PM CDT -----  Contact: patient  Calling concerning the status of medication request sent by pharmacy. Please call patient @ [phone]. THANKS, BENI

## 2018-06-08 ENCOUNTER — TELEPHONE (OUTPATIENT)
Dept: FAMILY MEDICINE | Facility: CLINIC | Age: 38
End: 2018-06-08

## 2018-06-08 NOTE — TELEPHONE ENCOUNTER
----- Message from Amanda Sepulveda sent at 6/8/2018  3:34 PM CDT -----  Contact: pt  Calling  about a missed call and please advise. [phone] (gbkr)

## 2018-06-21 RX ORDER — VALACYCLOVIR HYDROCHLORIDE 1 G/1
1000 TABLET, FILM COATED ORAL 2 TIMES DAILY
Qty: 10 TABLET | Refills: 0 | Status: SHIPPED | OUTPATIENT
Start: 2018-06-21 | End: 2018-10-12 | Stop reason: SDUPTHER

## 2018-06-21 NOTE — TELEPHONE ENCOUNTER
----- Message from Herlinda Denson sent at 6/21/2018  2:29 PM CDT -----  Contact: Patient  Patient called and stated he is out of town and he needs a new prescription sent to the pharmacy for him. He would like it to be called in and he will go and pick it up later after he gets out of work.    Acyclovir 1000 mg    Out of Town Pharmacy:  [facility]   [address] [place], [place] [address] [phone]    He can be contacted at [phone].    Thanks,  Herlinda

## 2018-08-06 RX ORDER — VALACYCLOVIR HYDROCHLORIDE 1 G/1
TABLET, FILM COATED ORAL
Qty: 10 TABLET | Refills: 0 | Status: SHIPPED | OUTPATIENT
Start: 2018-08-06 | End: 2018-09-05 | Stop reason: SDUPTHER

## 2018-09-05 RX ORDER — VALACYCLOVIR HYDROCHLORIDE 1 G/1
TABLET, FILM COATED ORAL
Qty: 10 TABLET | Refills: 0 | Status: SHIPPED | OUTPATIENT
Start: 2018-09-05 | End: 2018-11-14 | Stop reason: SDUPTHER

## 2018-10-12 NOTE — TELEPHONE ENCOUNTER
----- Message from Briana Bob sent at 10/12/2018  3:48 PM CDT -----  Contact: self   Requesting status of status for rx medication that pharmacy recently sent. Please call a back at [phone].      Thanks,  Briana Bob

## 2018-10-14 RX ORDER — VALACYCLOVIR HYDROCHLORIDE 1 G/1
1000 TABLET, FILM COATED ORAL 2 TIMES DAILY
Qty: 10 TABLET | Refills: 0 | Status: SHIPPED | OUTPATIENT
Start: 2018-10-14 | End: 2019-01-09

## 2018-11-14 RX ORDER — VALACYCLOVIR HYDROCHLORIDE 1 G/1
TABLET, FILM COATED ORAL
Qty: 10 TABLET | Refills: 0 | Status: SHIPPED | OUTPATIENT
Start: 2018-11-14 | End: 2019-01-09

## 2018-12-22 RX ORDER — VALACYCLOVIR HYDROCHLORIDE 1 G/1
TABLET, FILM COATED ORAL
Qty: 10 TABLET | Refills: 0 | OUTPATIENT
Start: 2018-12-22

## 2018-12-27 RX ORDER — VALACYCLOVIR HYDROCHLORIDE 1 G/1
TABLET, FILM COATED ORAL
Qty: 10 TABLET | Refills: 0 | OUTPATIENT
Start: 2018-12-27

## 2018-12-28 RX ORDER — VALACYCLOVIR HYDROCHLORIDE 1 G/1
TABLET, FILM COATED ORAL
Qty: 10 TABLET | Refills: 0 | OUTPATIENT
Start: 2018-12-28

## 2019-01-02 RX ORDER — VALACYCLOVIR HYDROCHLORIDE 1 G/1
1000 TABLET, FILM COATED ORAL 2 TIMES DAILY
Qty: 10 TABLET | Refills: 0 | OUTPATIENT
Start: 2019-01-02 | End: 2020-01-02

## 2019-01-04 RX ORDER — VALACYCLOVIR HYDROCHLORIDE 1 G/1
1000 TABLET, FILM COATED ORAL 2 TIMES DAILY
Qty: 10 TABLET | Refills: 0 | OUTPATIENT
Start: 2019-01-04 | End: 2020-01-04

## 2019-01-06 RX ORDER — VALACYCLOVIR HYDROCHLORIDE 1 G/1
1000 TABLET, FILM COATED ORAL 2 TIMES DAILY
Qty: 10 TABLET | Refills: 0 | OUTPATIENT
Start: 2019-01-06

## 2019-01-09 ENCOUNTER — LAB VISIT (OUTPATIENT)
Dept: LAB | Facility: HOSPITAL | Age: 39
End: 2019-01-09
Payer: COMMERCIAL

## 2019-01-09 ENCOUNTER — OFFICE VISIT (OUTPATIENT)
Dept: FAMILY MEDICINE | Facility: CLINIC | Age: 39
End: 2019-01-09
Payer: COMMERCIAL

## 2019-01-09 VITALS
DIASTOLIC BLOOD PRESSURE: 80 MMHG | HEIGHT: 68 IN | BODY MASS INDEX: 32.91 KG/M2 | TEMPERATURE: 98 F | WEIGHT: 217.13 LBS | SYSTOLIC BLOOD PRESSURE: 124 MMHG | HEART RATE: 81 BPM | OXYGEN SATURATION: 98 %

## 2019-01-09 DIAGNOSIS — Z00.00 ROUTINE ADULT HEALTH MAINTENANCE: ICD-10-CM

## 2019-01-09 DIAGNOSIS — B00.9 HSV-2 INFECTION: Primary | ICD-10-CM

## 2019-01-09 LAB
ALBUMIN SERPL BCP-MCNC: 4 G/DL
ALP SERPL-CCNC: 66 U/L
ALT SERPL W/O P-5'-P-CCNC: 16 U/L
ANION GAP SERPL CALC-SCNC: 7 MMOL/L
AST SERPL-CCNC: 24 U/L
BASOPHILS # BLD AUTO: 0.04 K/UL
BASOPHILS NFR BLD: 0.7 %
BILIRUB SERPL-MCNC: 0.6 MG/DL
BUN SERPL-MCNC: 19 MG/DL
CALCIUM SERPL-MCNC: 9.2 MG/DL
CHLORIDE SERPL-SCNC: 106 MMOL/L
CHOLEST SERPL-MCNC: 193 MG/DL
CHOLEST/HDLC SERPL: 4.1 {RATIO}
CO2 SERPL-SCNC: 26 MMOL/L
CREAT SERPL-MCNC: 1.2 MG/DL
DIFFERENTIAL METHOD: ABNORMAL
EOSINOPHIL # BLD AUTO: 0.1 K/UL
EOSINOPHIL NFR BLD: 1.8 %
ERYTHROCYTE [DISTWIDTH] IN BLOOD BY AUTOMATED COUNT: 12.9 %
EST. GFR  (AFRICAN AMERICAN): >60 ML/MIN/1.73 M^2
EST. GFR  (NON AFRICAN AMERICAN): >60 ML/MIN/1.73 M^2
FERRITIN SERPL-MCNC: 145 NG/ML
GLUCOSE SERPL-MCNC: 91 MG/DL
HCT VFR BLD AUTO: 39.5 %
HDLC SERPL-MCNC: 47 MG/DL
HDLC SERPL: 24.4 %
HGB BLD-MCNC: 13.2 G/DL
IMM GRANULOCYTES # BLD AUTO: 0.01 K/UL
IMM GRANULOCYTES NFR BLD AUTO: 0.2 %
IRON SERPL-MCNC: 56 UG/DL
LDLC SERPL CALC-MCNC: 127.8 MG/DL
LYMPHOCYTES # BLD AUTO: 3 K/UL
LYMPHOCYTES NFR BLD: 53.3 %
MCH RBC QN AUTO: 30.2 PG
MCHC RBC AUTO-ENTMCNC: 33.4 G/DL
MCV RBC AUTO: 90 FL
MONOCYTES # BLD AUTO: 0.6 K/UL
MONOCYTES NFR BLD: 11 %
NEUTROPHILS # BLD AUTO: 1.9 K/UL
NEUTROPHILS NFR BLD: 33 %
NONHDLC SERPL-MCNC: 146 MG/DL
NRBC BLD-RTO: 0 /100 WBC
PLATELET # BLD AUTO: 205 K/UL
PMV BLD AUTO: 12.3 FL
POTASSIUM SERPL-SCNC: 3.9 MMOL/L
PROT SERPL-MCNC: 7.6 G/DL
RBC # BLD AUTO: 4.37 M/UL
SATURATED IRON: 14 %
SODIUM SERPL-SCNC: 139 MMOL/L
TOTAL IRON BINDING CAPACITY: 406 UG/DL
TRANSFERRIN SERPL-MCNC: 274 MG/DL
TRIGL SERPL-MCNC: 91 MG/DL
TSH SERPL DL<=0.005 MIU/L-ACNC: 0.78 UIU/ML
WBC # BLD AUTO: 5.63 K/UL

## 2019-01-09 PROCEDURE — 86703 HIV-1/HIV-2 1 RESULT ANTBDY: CPT

## 2019-01-09 PROCEDURE — 80061 LIPID PANEL: CPT

## 2019-01-09 PROCEDURE — 99999 PR PBB SHADOW E&M-EST. PATIENT-LVL III: CPT | Mod: PBBFAC,,, | Performed by: INTERNAL MEDICINE

## 2019-01-09 PROCEDURE — 36415 COLL VENOUS BLD VENIPUNCTURE: CPT | Mod: PO

## 2019-01-09 PROCEDURE — 82728 ASSAY OF FERRITIN: CPT

## 2019-01-09 PROCEDURE — 80053 COMPREHEN METABOLIC PANEL: CPT

## 2019-01-09 PROCEDURE — 99395 PR PREVENTIVE VISIT,EST,18-39: ICD-10-PCS | Mod: S$GLB,,, | Performed by: INTERNAL MEDICINE

## 2019-01-09 PROCEDURE — 84403 ASSAY OF TOTAL TESTOSTERONE: CPT

## 2019-01-09 PROCEDURE — 99999 PR PBB SHADOW E&M-EST. PATIENT-LVL III: ICD-10-PCS | Mod: PBBFAC,,, | Performed by: INTERNAL MEDICINE

## 2019-01-09 PROCEDURE — 84443 ASSAY THYROID STIM HORMONE: CPT

## 2019-01-09 PROCEDURE — 83540 ASSAY OF IRON: CPT

## 2019-01-09 PROCEDURE — 99395 PREV VISIT EST AGE 18-39: CPT | Mod: S$GLB,,, | Performed by: INTERNAL MEDICINE

## 2019-01-09 PROCEDURE — 85025 COMPLETE CBC W/AUTO DIFF WBC: CPT

## 2019-01-09 RX ORDER — VALACYCLOVIR HYDROCHLORIDE 1 G/1
1000 TABLET, FILM COATED ORAL 2 TIMES DAILY
Qty: 30 TABLET | Refills: 0 | Status: SHIPPED | OUTPATIENT
Start: 2019-01-09 | End: 2019-03-11

## 2019-01-10 LAB
HIV 1+2 AB+HIV1 P24 AG SERPL QL IA: NEGATIVE
TESTOST SERPL-MCNC: 323 NG/DL

## 2019-01-10 NOTE — PROGRESS NOTES
Subjective:       Patient ID: Sukumar Bob is a 38 y.o. male.    Chief Complaint: Annual Exam    HPI  Past Medical History:   Diagnosis Date    HSV (herpes simplex virus) infection     Mild chronic anemia      Past Surgical History:   Procedure Laterality Date    none       Family History   Problem Relation Age of Onset    No Known Problems Mother     No Known Problems Father     No Known Problems Sister     No Known Problems Brother     Hypertension Maternal Grandmother     Stroke Maternal Grandmother     No Known Problems Sister     No Known Problems Brother     No Known Problems Son     No Known Problems Son     Throat cancer Cousin     Diabetes Neg Hx     Heart disease Neg Hx      Social History     Socioeconomic History    Marital status: Single     Spouse name: Not on file    Number of children: 2    Years of education: Not on file    Highest education level: Not on file   Social Needs    Financial resource strain: Not on file    Food insecurity - worry: Not on file    Food insecurity - inability: Not on file    Transportation needs - medical: Not on file    Transportation needs - non-medical: Not on file   Occupational History    Occupation:      Comment: [employer]   Tobacco Use    Smoking status: Never Smoker    Smokeless tobacco: Never Used   Substance and Sexual Activity    Alcohol use: Yes     Comment: socially    Drug use: No    Sexual activity: Yes     Partners: Female   Other Topics Concern    Not on file   Social History Narrative    He wears seatbelt. He is expecting a daughter soon.     Review of Systems   Constitutional: Negative for activity change, appetite change, chills, diaphoresis, fatigue, fever and unexpected weight change.   HENT: Negative for drooling, ear discharge, ear pain, facial swelling, hearing loss, mouth sores, nosebleeds, postnasal drip, rhinorrhea, sinus pressure, sneezing, sore throat, tinnitus, trouble  swallowing and voice change.    Eyes: Negative for photophobia, redness and visual disturbance.   Respiratory: Negative for apnea, cough, choking, chest tightness, shortness of breath and wheezing.    Cardiovascular: Negative for chest pain, palpitations and leg swelling.   Gastrointestinal: Negative for abdominal distention, abdominal pain, anal bleeding, blood in stool, constipation, diarrhea, nausea, rectal pain and vomiting.   Endocrine: Negative for cold intolerance, heat intolerance, polydipsia, polyphagia and polyuria.   Genitourinary: Negative for difficulty urinating, dysuria, enuresis, flank pain, frequency, genital sores, hematuria and urgency.   Musculoskeletal: Negative for arthralgias, back pain, gait problem, joint swelling, myalgias, neck pain and neck stiffness.   Skin: Negative for color change, pallor, rash and wound.   Allergic/Immunologic: Negative for food allergies and immunocompromised state.   Neurological: Negative for dizziness, tremors, seizures, syncope, facial asymmetry, speech difficulty, weakness, light-headedness, numbness and headaches.   Hematological: Negative for adenopathy. Does not bruise/bleed easily.   Psychiatric/Behavioral: Negative for agitation, behavioral problems, confusion, decreased concentration, dysphoric mood, hallucinations, self-injury, sleep disturbance and suicidal ideas. The patient is not nervous/anxious and is not hyperactive.        Objective:      Physical Exam   Constitutional: He is oriented to person, place, and time. He appears well-developed and well-nourished. No distress.   HENT:   Head: Normocephalic and atraumatic.   Eyes: Pupils are equal, round, and reactive to light.   Neck: Normal range of motion. Neck supple. No JVD present. Carotid bruit is not present. No tracheal deviation present. No thyromegaly present.   Cardiovascular: Normal rate, regular rhythm, normal heart sounds and intact distal pulses.   Pulmonary/Chest: Effort normal and breath  sounds normal. No respiratory distress. He has no wheezes. He has no rales. He exhibits no tenderness.   Abdominal: Soft. Bowel sounds are normal. He exhibits no distension. There is no tenderness. There is no rebound and no guarding.   Musculoskeletal: Normal range of motion. He exhibits no edema or tenderness.   Lymphadenopathy:     He has no cervical adenopathy.   Neurological: He is alert and oriented to person, place, and time. No cranial nerve deficit. Coordination normal.   Skin: Skin is warm and dry. No rash noted. He is not diaphoretic. No erythema. No pallor.   Psychiatric: He has a normal mood and affect. His behavior is normal. Judgment and thought content normal.   Nursing note and vitals reviewed.      CMP  Sodium   Date Value Ref Range Status   01/09/2019 139 136 - 145 mmol/L Final     Potassium   Date Value Ref Range Status   01/09/2019 3.9 3.5 - 5.1 mmol/L Final     Chloride   Date Value Ref Range Status   01/09/2019 106 95 - 110 mmol/L Final     CO2   Date Value Ref Range Status   01/09/2019 26 23 - 29 mmol/L Final     Glucose   Date Value Ref Range Status   01/09/2019 91 70 - 110 mg/dL Final     BUN, Bld   Date Value Ref Range Status   01/09/2019 19 6 - 20 mg/dL Final     Creatinine   Date Value Ref Range Status   01/09/2019 1.2 0.5 - 1.4 mg/dL Final     Calcium   Date Value Ref Range Status   01/09/2019 9.2 8.7 - 10.5 mg/dL Final     Total Protein   Date Value Ref Range Status   01/09/2019 7.6 6.0 - 8.4 g/dL Final     Albumin   Date Value Ref Range Status   01/09/2019 4.0 3.5 - 5.2 g/dL Final     Total Bilirubin   Date Value Ref Range Status   01/09/2019 0.6 0.1 - 1.0 mg/dL Final     Comment:     For infants and newborns, interpretation of results should be based  on gestational age, weight and in agreement with clinical  observations.  Premature Infant recommended reference ranges:  Up to 24 hours.............<8.0 mg/dL  Up to 48 hours............<12.0 mg/dL  3-5 days..................<15.0  mg/dL  6-29 days.................<15.0 mg/dL       Alkaline Phosphatase   Date Value Ref Range Status   01/09/2019 66 55 - 135 U/L Final     AST   Date Value Ref Range Status   01/09/2019 24 10 - 40 U/L Final     ALT   Date Value Ref Range Status   01/09/2019 16 10 - 44 U/L Final     Anion Gap   Date Value Ref Range Status   01/09/2019 7 (L) 8 - 16 mmol/L Final     eGFR if    Date Value Ref Range Status   01/09/2019 >60.0 >60 mL/min/1.73 m^2 Final     eGFR if non    Date Value Ref Range Status   01/09/2019 >60.0 >60 mL/min/1.73 m^2 Final     Comment:     Calculation used to obtain the estimated glomerular filtration  rate (eGFR) is the CKD-EPI equation.        Lab Results   Component Value Date    WBC 5.63 01/09/2019    HGB 13.2 (L) 01/09/2019    HCT 39.5 (L) 01/09/2019    MCV 90 01/09/2019     01/09/2019     Lab Results   Component Value Date    CHOL 193 01/09/2019     Lab Results   Component Value Date    HDL 47 01/09/2019     Lab Results   Component Value Date    LDLCALC 127.8 01/09/2019     Lab Results   Component Value Date    TRIG 91 01/09/2019     Lab Results   Component Value Date    CHOLHDL 24.4 01/09/2019     Lab Results   Component Value Date    TSH 0.777 01/09/2019     Lab Results   Component Value Date    HGBA1C 5.3 01/02/2018     Assessment:       1. HSV-2 infection    2. Routine adult health maintenance        Plan:   HSV-2 infection    Routine adult health maintenance  -     Comprehensive metabolic panel; Future; Expected date: 01/09/2019  -     CBC auto differential; Future; Expected date: 01/09/2019  -     Lipid panel; Future; Expected date: 01/09/2019  -     TSH; Future; Expected date: 01/09/2019  -     Ferritin; Future; Expected date: 01/09/2019  -     Iron and TIBC; Future; Expected date: 01/09/2019  -     Testosterone; Future; Expected date: 01/09/2019  -     HIV 1/2 Ag/Ab (4th Gen); Future; Expected date: 01/09/2019    Other orders  -     valACYclovir  (VALTREX) 1000 MG tablet; Take 1 tablet (1,000 mg total) by mouth 2 (two) times daily.  Dispense: 30 tablet; Refill: 0    f/u 1 year.

## 2019-01-22 ENCOUNTER — TELEPHONE (OUTPATIENT)
Dept: FAMILY MEDICINE | Facility: CLINIC | Age: 39
End: 2019-01-22

## 2019-01-22 NOTE — TELEPHONE ENCOUNTER
----- Message from Darlyn Trevino sent at 1/22/2019 10:32 AM CST -----  Pt at [phone]//states he received his lab results in the mail and has questions//please call//rachel/shanti

## 2019-03-11 ENCOUNTER — OFFICE VISIT (OUTPATIENT)
Dept: FAMILY MEDICINE | Facility: CLINIC | Age: 39
End: 2019-03-11
Payer: COMMERCIAL

## 2019-03-11 VITALS
HEART RATE: 82 BPM | DIASTOLIC BLOOD PRESSURE: 79 MMHG | BODY MASS INDEX: 32.24 KG/M2 | HEIGHT: 68 IN | WEIGHT: 212.75 LBS | OXYGEN SATURATION: 97 % | SYSTOLIC BLOOD PRESSURE: 120 MMHG | TEMPERATURE: 99 F

## 2019-03-11 DIAGNOSIS — S61.219A CUT OF FINGER: Primary | ICD-10-CM

## 2019-03-11 DIAGNOSIS — M79.644 FINGER PAIN, RIGHT: ICD-10-CM

## 2019-03-11 DIAGNOSIS — L03.90 CELLULITIS, UNSPECIFIED CELLULITIS SITE: ICD-10-CM

## 2019-03-11 DIAGNOSIS — M20.019 MALLET FINGER, UNSPECIFIED LATERALITY: ICD-10-CM

## 2019-03-11 PROCEDURE — 99999 PR PBB SHADOW E&M-EST. PATIENT-LVL III: CPT | Mod: PBBFAC,,, | Performed by: FAMILY MEDICINE

## 2019-03-11 PROCEDURE — 99999 PR PBB SHADOW E&M-EST. PATIENT-LVL III: ICD-10-PCS | Mod: PBBFAC,,, | Performed by: FAMILY MEDICINE

## 2019-03-11 PROCEDURE — 3008F BODY MASS INDEX DOCD: CPT | Mod: CPTII,S$GLB,, | Performed by: FAMILY MEDICINE

## 2019-03-11 PROCEDURE — 99214 OFFICE O/P EST MOD 30 MIN: CPT | Mod: S$GLB,,, | Performed by: FAMILY MEDICINE

## 2019-03-11 PROCEDURE — 99214 PR OFFICE/OUTPT VISIT, EST, LEVL IV, 30-39 MIN: ICD-10-PCS | Mod: S$GLB,,, | Performed by: FAMILY MEDICINE

## 2019-03-11 PROCEDURE — 3008F PR BODY MASS INDEX (BMI) DOCUMENTED: ICD-10-PCS | Mod: CPTII,S$GLB,, | Performed by: FAMILY MEDICINE

## 2019-03-11 RX ORDER — CEPHALEXIN 500 MG/1
500 CAPSULE ORAL EVERY 12 HOURS
Qty: 20 CAPSULE | Refills: 0 | Status: SHIPPED | OUTPATIENT
Start: 2019-03-11 | End: 2019-03-21

## 2019-03-11 RX ORDER — MUPIROCIN 20 MG/G
OINTMENT TOPICAL 3 TIMES DAILY
Qty: 15 G | Refills: 0 | Status: SHIPPED | OUTPATIENT
Start: 2019-03-11 | End: 2020-01-09

## 2019-03-11 RX ORDER — IBUPROFEN 800 MG/1
800 TABLET ORAL 3 TIMES DAILY
Qty: 45 TABLET | Refills: 0 | Status: SHIPPED | OUTPATIENT
Start: 2019-03-11 | End: 2020-01-09
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
## 2019-03-25 RX ORDER — VALACYCLOVIR HYDROCHLORIDE 1 G/1
TABLET, FILM COATED ORAL
Qty: 10 TABLET | Refills: 0 | Status: SHIPPED | OUTPATIENT
Start: 2019-03-25 | End: 2019-04-10 | Stop reason: SDUPTHER

## 2019-04-10 RX ORDER — VALACYCLOVIR HYDROCHLORIDE 1 G/1
TABLET, FILM COATED ORAL
Qty: 10 TABLET | Refills: 0 | Status: SHIPPED | OUTPATIENT
Start: 2019-04-10 | End: 2019-05-21 | Stop reason: SDUPTHER

## 2019-05-02 ENCOUNTER — OFFICE VISIT (OUTPATIENT)
Dept: FAMILY MEDICINE | Facility: CLINIC | Age: 39
End: 2019-05-02
Payer: COMMERCIAL

## 2019-05-02 VITALS
SYSTOLIC BLOOD PRESSURE: 122 MMHG | BODY MASS INDEX: 31.54 KG/M2 | TEMPERATURE: 98 F | OXYGEN SATURATION: 98 % | DIASTOLIC BLOOD PRESSURE: 72 MMHG | WEIGHT: 208.13 LBS | HEIGHT: 68 IN | HEART RATE: 69 BPM

## 2019-05-02 DIAGNOSIS — R21 SKIN RASH: Primary | ICD-10-CM

## 2019-05-02 PROCEDURE — 3008F BODY MASS INDEX DOCD: CPT | Mod: CPTII,S$GLB,, | Performed by: FAMILY MEDICINE

## 2019-05-02 PROCEDURE — 99999 PR PBB SHADOW E&M-EST. PATIENT-LVL III: CPT | Mod: PBBFAC,,, | Performed by: FAMILY MEDICINE

## 2019-05-02 PROCEDURE — 3008F PR BODY MASS INDEX (BMI) DOCUMENTED: ICD-10-PCS | Mod: CPTII,S$GLB,, | Performed by: FAMILY MEDICINE

## 2019-05-02 PROCEDURE — 99213 OFFICE O/P EST LOW 20 MIN: CPT | Mod: S$GLB,,, | Performed by: FAMILY MEDICINE

## 2019-05-02 PROCEDURE — 99999 PR PBB SHADOW E&M-EST. PATIENT-LVL III: ICD-10-PCS | Mod: PBBFAC,,, | Performed by: FAMILY MEDICINE

## 2019-05-02 PROCEDURE — 99213 PR OFFICE/OUTPT VISIT, EST, LEVL III, 20-29 MIN: ICD-10-PCS | Mod: S$GLB,,, | Performed by: FAMILY MEDICINE

## 2019-05-02 RX ORDER — HYDROCORTISONE 25 MG/G
CREAM TOPICAL 2 TIMES DAILY
Qty: 3.5 G | Refills: 0 | Status: SHIPPED | OUTPATIENT
Start: 2019-05-02 | End: 2020-01-09

## 2019-05-21 RX ORDER — VALACYCLOVIR HYDROCHLORIDE 1 G/1
TABLET, FILM COATED ORAL
Qty: 10 TABLET | Refills: 0 | Status: SHIPPED | OUTPATIENT
Start: 2019-05-21 | End: 2019-07-22 | Stop reason: SDUPTHER

## 2019-07-04 ENCOUNTER — HOSPITAL ENCOUNTER (EMERGENCY)
Facility: HOSPITAL | Age: 39
Discharge: HOME | End: 2019-07-04
Attending: EMERGENCY MEDICINE
Payer: COMMERCIAL

## 2019-07-04 VITALS
BODY MASS INDEX: 31.98 KG/M2 | HEIGHT: 68 IN | OXYGEN SATURATION: 98 % | TEMPERATURE: 98 F | DIASTOLIC BLOOD PRESSURE: 57 MMHG | RESPIRATION RATE: 16 BRPM | WEIGHT: 211 LBS | HEART RATE: 82 BPM | SYSTOLIC BLOOD PRESSURE: 109 MMHG

## 2019-07-04 DIAGNOSIS — S01.112A EYEBROW LACERATION, LEFT, INITIAL ENCOUNTER: Primary | ICD-10-CM

## 2019-07-04 DIAGNOSIS — S09.90XA INJURY OF HEAD, INITIAL ENCOUNTER: ICD-10-CM

## 2019-07-04 PROCEDURE — 12011 RPR F/E/E/N/L/M 2.5 CM/<: CPT

## 2019-07-04 PROCEDURE — 99282 EMERGENCY DEPT VISIT SF MDM: CPT | Mod: 25

## 2019-07-04 NOTE — ED NOTES
Patient examined, evaluated, and educated on discharge prescriptions and instructions by APRYL. [facility]
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
## 2019-07-23 RX ORDER — VALACYCLOVIR HYDROCHLORIDE 1 G/1
TABLET, FILM COATED ORAL
Qty: 10 TABLET | Refills: 0 | Status: SHIPPED | OUTPATIENT
Start: 2019-07-23 | End: 2019-09-17 | Stop reason: SDUPTHER

## 2019-08-01 ENCOUNTER — TELEPHONE (OUTPATIENT)
Dept: FAMILY MEDICINE | Facility: CLINIC | Age: 39
End: 2019-08-01

## 2019-08-01 NOTE — TELEPHONE ENCOUNTER
----- Message from Stefania Goel sent at 8/1/2019 12:44 PM CDT -----  Contact: pt  .Type:  Patient Returning Call    Who Called: pt  Who Left Message for Patient:   Does the patient know what this is regarding?:   Would the patient rather a call back or a response via [facility]?  Call back   Best Call Back Number: [phone] (home)    Additional Information:

## 2019-08-06 ENCOUNTER — OFFICE VISIT (OUTPATIENT)
Dept: FAMILY MEDICINE | Facility: CLINIC | Age: 39
End: 2019-08-06
Payer: COMMERCIAL

## 2019-08-06 ENCOUNTER — TELEPHONE (OUTPATIENT)
Dept: FAMILY MEDICINE | Facility: CLINIC | Age: 39
End: 2019-08-06

## 2019-08-06 ENCOUNTER — HOSPITAL ENCOUNTER (OUTPATIENT)
Dept: RADIOLOGY | Facility: HOSPITAL | Age: 39
Discharge: HOME | End: 2019-08-06
Attending: INTERNAL MEDICINE
Payer: COMMERCIAL

## 2019-08-06 VITALS
WEIGHT: 212.75 LBS | TEMPERATURE: 98 F | BODY MASS INDEX: 32.24 KG/M2 | OXYGEN SATURATION: 98 % | HEART RATE: 66 BPM | DIASTOLIC BLOOD PRESSURE: 73 MMHG | SYSTOLIC BLOOD PRESSURE: 118 MMHG | HEIGHT: 68 IN

## 2019-08-06 DIAGNOSIS — R79.89 LOW TESTOSTERONE: ICD-10-CM

## 2019-08-06 DIAGNOSIS — L84 PRE-ULCERATIVE CORN OR CALLOUS: Primary | ICD-10-CM

## 2019-08-06 DIAGNOSIS — L84 PRE-ULCERATIVE CORN OR CALLOUS: ICD-10-CM

## 2019-08-06 PROCEDURE — 99213 OFFICE O/P EST LOW 20 MIN: CPT | Mod: 25,S$GLB,, | Performed by: INTERNAL MEDICINE

## 2019-08-06 PROCEDURE — 73630 X-RAY EXAM OF FOOT: CPT | Mod: 26,LT,, | Performed by: RADIOLOGY

## 2019-08-06 PROCEDURE — 73630 X-RAY EXAM OF FOOT: CPT | Mod: TC,FY,PO,LT

## 2019-08-06 PROCEDURE — 73630 XR FOOT COMPLETE 3 VIEW LEFT: ICD-10-PCS | Mod: 26,LT,, | Performed by: RADIOLOGY

## 2019-08-06 PROCEDURE — 96372 PR INJECTION,THERAP/PROPH/DIAG2ST, IM OR SUBCUT: ICD-10-PCS | Mod: S$GLB,,, | Performed by: INTERNAL MEDICINE

## 2019-08-06 PROCEDURE — 3008F BODY MASS INDEX DOCD: CPT | Mod: CPTII,S$GLB,, | Performed by: INTERNAL MEDICINE

## 2019-08-06 PROCEDURE — 99999 PR PBB SHADOW E&M-EST. PATIENT-LVL III: CPT | Mod: PBBFAC,,, | Performed by: INTERNAL MEDICINE

## 2019-08-06 PROCEDURE — 99213 PR OFFICE/OUTPT VISIT, EST, LEVL III, 20-29 MIN: ICD-10-PCS | Mod: 25,S$GLB,, | Performed by: INTERNAL MEDICINE

## 2019-08-06 PROCEDURE — 96372 THER/PROPH/DIAG INJ SC/IM: CPT | Mod: S$GLB,,, | Performed by: INTERNAL MEDICINE

## 2019-08-06 PROCEDURE — 99999 PR PBB SHADOW E&M-EST. PATIENT-LVL III: ICD-10-PCS | Mod: PBBFAC,,, | Performed by: INTERNAL MEDICINE

## 2019-08-06 PROCEDURE — 3008F PR BODY MASS INDEX (BMI) DOCUMENTED: ICD-10-PCS | Mod: CPTII,S$GLB,, | Performed by: INTERNAL MEDICINE

## 2019-08-06 RX ORDER — TESTOSTERONE CYPIONATE 200 MG/ML
200 INJECTION, SOLUTION INTRAMUSCULAR
Status: DISCONTINUED | OUTPATIENT
Start: 2019-08-07 | End: 2019-08-06

## 2019-08-06 RX ORDER — MELOXICAM 15 MG/1
15 TABLET ORAL DAILY PRN
Qty: 30 TABLET | Refills: 0 | Status: SHIPPED | OUTPATIENT
Start: 2019-08-06 | End: 2020-01-09

## 2019-08-06 RX ORDER — TESTOSTERONE CYPIONATE 200 MG/ML
200 INJECTION, SOLUTION INTRAMUSCULAR
Status: DISCONTINUED | OUTPATIENT
Start: 2019-08-06 | End: 2020-01-10

## 2019-08-06 RX ADMIN — TESTOSTERONE CYPIONATE 200 MG: 200 INJECTION, SOLUTION INTRAMUSCULAR at 04:08

## 2019-08-06 NOTE — PROGRESS NOTES
Subjective:       Patient ID: Sukumar Bob is a 39 y.o. male.    Chief Complaint: Cyst (right foot)    Sole left foot--pain x 2 weeks------------    Cyst   Pertinent negatives include no abdominal pain, chest pain, chills, coughing, fever, nausea or vomiting.     Past Medical History:   Diagnosis Date    HSV (herpes simplex virus) infection     Mild chronic anemia      Past Surgical History:   Procedure Laterality Date    none       Family History   Problem Relation Age of Onset    No Known Problems Mother     No Known Problems Father     No Known Problems Sister     No Known Problems Brother     Hypertension Maternal Grandmother     Stroke Maternal Grandmother     No Known Problems Sister     No Known Problems Brother     No Known Problems Son     No Known Problems Son     Throat cancer Cousin     Diabetes Neg Hx     Heart disease Neg Hx      Social History     Socioeconomic History    Marital status: Single     Spouse name: Not on file    Number of children: 2    Years of education: Not on file    Highest education level: Not on file   Occupational History    Occupation:      Comment: [employer]   Social Needs    Financial resource strain: Not on file    Food insecurity:     Worry: Not on file     Inability: Not on file    Transportation needs:     Medical: Not on file     Non-medical: Not on file   Tobacco Use    Smoking status: Never Smoker    Smokeless tobacco: Never Used   Substance and Sexual Activity    Alcohol use: Yes     Comment: socially    Drug use: No    Sexual activity: Yes     Partners: Female   Lifestyle    Physical activity:     Days per week: Not on file     Minutes per session: Not on file    Stress: Not on file   Relationships    Social connections:     Talks on phone: Not on file     Gets together: Not on file     Attends Anabaptist service: Not on file     Active member of club or organization: Not on file     Attends  meetings of clubs or organizations: Not on file     Relationship status: Not on file   Other Topics Concern    Not on file   Social History Narrative    He wears seatbelt. He is expecting a daughter soon.     Review of Systems   Constitutional: Negative for chills and fever.   HENT: Negative.    Respiratory: Negative for apnea, cough, choking, chest tightness, shortness of breath, wheezing and stridor.    Cardiovascular: Negative for chest pain and palpitations.   Gastrointestinal: Negative for abdominal pain, nausea and vomiting.   Genitourinary: Negative for dysuria and hematuria.   Neurological: Negative.    Psychiatric/Behavioral: Negative.        Objective:      Physical Exam   Constitutional: He appears well-developed and well-nourished.   Cardiovascular: Normal rate, regular rhythm, normal heart sounds and intact distal pulses.   Pulmonary/Chest: Effort normal and breath sounds normal.   Abdominal: Soft. Bowel sounds are normal.   Musculoskeletal: He exhibits tenderness.   Nursing note and vitals reviewed.      CMP  Sodium   Date Value Ref Range Status   01/09/2019 139 136 - 145 mmol/L Final     Potassium   Date Value Ref Range Status   01/09/2019 3.9 3.5 - 5.1 mmol/L Final     Chloride   Date Value Ref Range Status   01/09/2019 106 95 - 110 mmol/L Final     CO2   Date Value Ref Range Status   01/09/2019 26 23 - 29 mmol/L Final     Glucose   Date Value Ref Range Status   01/09/2019 91 70 - 110 mg/dL Final     BUN, Bld   Date Value Ref Range Status   01/09/2019 19 6 - 20 mg/dL Final     Creatinine   Date Value Ref Range Status   01/09/2019 1.2 0.5 - 1.4 mg/dL Final     Calcium   Date Value Ref Range Status   01/09/2019 9.2 8.7 - 10.5 mg/dL Final     Total Protein   Date Value Ref Range Status   01/09/2019 7.6 6.0 - 8.4 g/dL Final     Albumin   Date Value Ref Range Status   01/09/2019 4.0 3.5 - 5.2 g/dL Final     Total Bilirubin   Date Value Ref Range Status   01/09/2019 0.6 0.1 - 1.0 mg/dL Final     Comment:      For infants and newborns, interpretation of results should be based  on gestational age, weight and in agreement with clinical  observations.  Premature Infant recommended reference ranges:  Up to 24 hours.............<8.0 mg/dL  Up to 48 hours............<12.0 mg/dL  3-5 days..................<15.0 mg/dL  6-29 days.................<15.0 mg/dL       Alkaline Phosphatase   Date Value Ref Range Status   01/09/2019 66 55 - 135 U/L Final     AST   Date Value Ref Range Status   01/09/2019 24 10 - 40 U/L Final     ALT   Date Value Ref Range Status   01/09/2019 16 10 - 44 U/L Final     Anion Gap   Date Value Ref Range Status   01/09/2019 7 (L) 8 - 16 mmol/L Final     eGFR if    Date Value Ref Range Status   01/09/2019 >60.0 >60 mL/min/1.73 m^2 Final     eGFR if non    Date Value Ref Range Status   01/09/2019 >60.0 >60 mL/min/1.73 m^2 Final     Comment:     Calculation used to obtain the estimated glomerular filtration  rate (eGFR) is the CKD-EPI equation.        Lab Results   Component Value Date    WBC 5.63 01/09/2019    HGB 13.2 (L) 01/09/2019    HCT 39.5 (L) 01/09/2019    MCV 90 01/09/2019     01/09/2019     Lab Results   Component Value Date    CHOL 193 01/09/2019     Lab Results   Component Value Date    HDL 47 01/09/2019     Lab Results   Component Value Date    LDLCALC 127.8 01/09/2019     Lab Results   Component Value Date    TRIG 91 01/09/2019     Lab Results   Component Value Date    CHOLHDL 24.4 01/09/2019     Lab Results   Component Value Date    TSH 0.777 01/09/2019     Lab Results   Component Value Date    HGBA1C 5.3 01/02/2018     Assessment:       1. Pre-ulcerative corn or callous    2. Low testosterone        Plan:   Pre-ulcerative corn or callous  -     X-Ray Foot 2 View Left; Future; Expected date: 08/06/2019    Low testosterone    Other orders  -     testosterone cypionate injection 200 mg  -     meloxicam (MOBIC) 15 MG tablet; Take 1 tablet (15 mg total) by  mouth daily as needed.  Dispense: 30 tablet; Refill: 0    F/u 1 month

## 2019-08-13 ENCOUNTER — TELEPHONE (OUTPATIENT)
Dept: FAMILY MEDICINE | Facility: CLINIC | Age: 39
End: 2019-08-13

## 2019-08-13 DIAGNOSIS — M79.673 PAIN OF FOOT, UNSPECIFIED LATERALITY: Primary | ICD-10-CM

## 2019-08-13 NOTE — TELEPHONE ENCOUNTER
----- Message from Mustapha Anderson sent at 8/13/2019 10:07 AM CDT -----  Contact: PT   Type:  Needs Medical Advice    Who Called:  Pt   Symptoms (please be specific): knot still causing pain in his RT foot   How long has patient had these symptoms: last few weeks  Pharmacy name and phone #:  [facility] on [facility]  Would the patient rather a call back or a response via [facility]? Call back   Best Call Back Number: [phone] (home)   Additional Information: The patient was told to call back in regards to the current medication not working and having persistent pain ,please advise as soon as possible.

## 2019-08-15 ENCOUNTER — OFFICE VISIT (OUTPATIENT)
Dept: PODIATRY | Facility: CLINIC | Age: 39
End: 2019-08-15
Payer: COMMERCIAL

## 2019-08-15 VITALS
HEIGHT: 68 IN | SYSTOLIC BLOOD PRESSURE: 109 MMHG | WEIGHT: 215.19 LBS | BODY MASS INDEX: 32.61 KG/M2 | HEART RATE: 73 BPM | DIASTOLIC BLOOD PRESSURE: 72 MMHG

## 2019-08-15 DIAGNOSIS — M77.42 METATARSALGIA, LEFT FOOT: Primary | ICD-10-CM

## 2019-08-15 DIAGNOSIS — B35.3 TINEA PEDIS OF BOTH FEET: ICD-10-CM

## 2019-08-15 DIAGNOSIS — M79.672 PAIN IN LEFT FOOT: ICD-10-CM

## 2019-08-15 DIAGNOSIS — M21.42 ACQUIRED PES PLANUS, LEFT: ICD-10-CM

## 2019-08-15 DIAGNOSIS — M24.572 CONTRACTURE, LEFT ANKLE: ICD-10-CM

## 2019-08-15 PROCEDURE — 3008F PR BODY MASS INDEX (BMI) DOCUMENTED: ICD-10-PCS | Mod: CPTII,S$GLB,, | Performed by: PODIATRIST

## 2019-08-15 PROCEDURE — 99999 PR PBB SHADOW E&M-EST. PATIENT-LVL III: ICD-10-PCS | Mod: PBBFAC,,, | Performed by: PODIATRIST

## 2019-08-15 PROCEDURE — 3008F BODY MASS INDEX DOCD: CPT | Mod: CPTII,S$GLB,, | Performed by: PODIATRIST

## 2019-08-15 PROCEDURE — 99203 PR OFFICE/OUTPT VISIT, NEW, LEVL III, 30-44 MIN: ICD-10-PCS | Mod: S$GLB,,, | Performed by: PODIATRIST

## 2019-08-15 PROCEDURE — 99203 OFFICE O/P NEW LOW 30 MIN: CPT | Mod: S$GLB,,, | Performed by: PODIATRIST

## 2019-08-15 PROCEDURE — 99999 PR PBB SHADOW E&M-EST. PATIENT-LVL III: CPT | Mod: PBBFAC,,, | Performed by: PODIATRIST

## 2019-08-15 RX ORDER — TERBINAFINE HYDROCHLORIDE 250 MG/1
250 TABLET ORAL DAILY
Qty: 30 TABLET | Refills: 0 | Status: SHIPPED | OUTPATIENT
Start: 2019-08-15 | End: 2019-09-14

## 2019-08-15 NOTE — PROGRESS NOTES
Subjective:       Patient ID: Sukumar Bob is a 39 y.o. male.    Chief Complaint: Foot Pain (left ball of foot pain; pt reports pain at 7/10; pt reports pain for roughly 2 weeks.)      HPI: Sukumar Bob presents to the office today, with complaints of pains to the left foot. The pains are stated as severe to the ball(s) of the foot. Patient states limping with gait at times due to the pains. Pains are exacerbated by walking and standing. Sitting and limited WB does alleviate and/or decrease the pains. The patient does not have hammer toe contractures.  Patient does have a flat foot type.  Patient does ambulate daily with work boots.  States alternation of shoe gear has not been helpful. Patient's Primary Care Provider is Jet Jefferson MD.  He also states pruritus and rash formation of the bilateral foot.    Review of patient's allergies indicates:  No Known Allergies    Past Medical History:   Diagnosis Date    HSV (herpes simplex virus) infection     Mild chronic anemia        Family History   Problem Relation Age of Onset    No Known Problems Mother     No Known Problems Father     No Known Problems Sister     No Known Problems Brother     Hypertension Maternal Grandmother     Stroke Maternal Grandmother     No Known Problems Sister     No Known Problems Brother     No Known Problems Son     No Known Problems Son     Throat cancer Cousin     Diabetes Neg Hx     Heart disease Neg Hx        Social History     Socioeconomic History    Marital status: Single     Spouse name: Not on file    Number of children: 2    Years of education: Not on file    Highest education level: Not on file   Occupational History    Occupation:      Comment: [employer]   Social Needs    Financial resource strain: Not on file    Food insecurity:     Worry: Not on file     Inability: Not on file    Transportation needs:     Medical: Not on file      "Non-medical: Not on file   Tobacco Use    Smoking status: Never Smoker    Smokeless tobacco: Never Used   Substance and Sexual Activity    Alcohol use: Yes     Comment: socially    Drug use: No    Sexual activity: Yes     Partners: Female   Lifestyle    Physical activity:     Days per week: Not on file     Minutes per session: Not on file    Stress: Not on file   Relationships    Social connections:     Talks on phone: Not on file     Gets together: Not on file     Attends Voodoo service: Not on file     Active member of club or organization: Not on file     Attends meetings of clubs or organizations: Not on file     Relationship status: Not on file   Other Topics Concern    Not on file   Social History Narrative    He wears seatbelt. He is expecting a daughter soon.       Past Surgical History:   Procedure Laterality Date    none         Review of Systems   Constitutional: Negative for chills, fatigue and fever.   HENT: Negative for hearing loss.    Eyes: Negative for photophobia and visual disturbance.   Respiratory: Negative for cough, chest tightness, shortness of breath and wheezing.    Cardiovascular: Negative for chest pain and palpitations.   Gastrointestinal: Negative for constipation, diarrhea, nausea and vomiting.   Endocrine: Negative for cold intolerance and heat intolerance.   Genitourinary: Negative for flank pain.   Musculoskeletal: Positive for arthralgias and gait problem. Negative for neck pain and neck stiffness.   Skin: Positive for rash and wound.   Neurological: Negative for light-headedness and headaches.   Psychiatric/Behavioral: Negative for sleep disturbance.         Objective:   /72 (BP Location: Left arm, Patient Position: Sitting)   Pulse 73   Ht 5' 8" (1.727 m)   Wt 97.6 kg (215 lb 2.7 oz)   BMI 32.72 kg/m²     X-Ray Foot Complete 3 view Left  Narrative: EXAMINATION:  XR FOOT COMPLETE 3 VIEW LEFT    CLINICAL HISTORY:  .  Corns and callosities    TECHNIQUE:  AP, " lateral and oblique views of the left foot were performed.    COMPARISON:  None    FINDINGS:  No acute osseous abnormality.  Mild 1st metatarsal median eminence prominence noted without significant hallux valgus deformity.  No focal soft tissue abnormality.  Impression: As above    Electronically signed by: Arden Fischer MD  Date:    08/06/2019  Time:    16:54      Physical Exam    LOWER EXTREMITY PHYSICAL EXAMINATION    DERMATOLOGY: Skin is supple, dry and intact. No ecchymosis is noted.  Callus formation, plantar aspect of the left 4th metatarsophalangeal joint. No erythema or cellulitis is noted. Tinea pedis noted to the bilateral lower extremity.  Macular, hyperkeratotic rashes are noted.    VASCULAR: The right dorsalis pedis pulse is 2/4 and the posterior tibial pulse is 2/4. The left dorsalis pedis pulse is 2/4 and the posterior tibial pulse is 2/4. Hair growth is noted on the dorsal foot and digits. Proximal to distal, warm to warm. Capillary refill time is WNL at less than 3s.    NEUROLOGY: Protective sensation is intact via 5.07 Strawn Jesús monofilament. Proprioception is intact. Sensation to light touch is intact. Upon palpation of the interspaces, there are no neurological sensations stated that radiate proximal or distal. Upon compression of the metatarsal heads from medial to lateral, no neurological sensations or symptoms are stated.    ORTHOPEDIC: Manual Muscle Testing is 5/5 in all planes on the left foot, without pains, with and without resistance. Gait pattern is antalgic. There is severe discomfort to palpation at the left plantar 2nd, 3rd and 4th MTPJ. Plantar fat pad atrophy with displacement is noted.  Gastrocsoleal equinus contracture is noted.  Rectus foot type is noted. Negative Lachman's testing is noted. Concern is not for Predislocation Syndrome or plantar plate rupture at the left lesser MTPJs. No concern for inter-metatarsal bursitis.  Pes planus foot type is noted with equinus  contracture.    Assessment:     1. Metatarsalgia, left foot    2. Contracture, left ankle    3. Acquired pes planus, left    4. Pain in left foot    5. Tinea pedis of both feet        Plan:     Metatarsalgia, left foot  Contracture, left ankle  Acquired pes planus, left  Pain in left foot  Thorough discussion is had with the patient today, concerning the diagnosis, its etiology, and the treatment algorithm at present.  XRAYS are reviewed in detail with the patient. All questions and concerns regarding findings and its/their implications are outlined and discussed.    Metatarsalgia secondary to pes planus foot type and equinus contracture.    Did discuss proper and supportive shoe gear in detail and at length with the patient.  These are shoes with firm and robust arch support; medial counter.  Shoes which only bend at the metatarsophalangeal joint and which are rigid in the midfoot and hindfoot. Patient urged to purchase running type or cross training type shoes gear which are designed for pronation control.  Prescription is written for Orthotist evaluation at [facility], [address] [place] [id number], for lower extremity orthotic(s) management and/or shoe gear management.    Continue NSAIDs as needed.        Tinea pedis of both feet  -     terbinafine HCl (LAMISIL) 250 mg tablet; Take 1 tablet (250 mg total) by mouth once daily.  Dispense: 30 tablet; Refill: 0    Recommend moisture wicking socks to alleviate the hyperhidrosis which makes one prone to recurrent tinea pedis.  I also recommend to alternate shoe gear, meaning, Monday, Wednesday, Friday, Saturday/Sunday and Tuesday, Thursday, Saturday/Sunday.  I do also recommend Tinactin antifungal spray to shoes daily.  After the aforementioned, put the shoes aside and allow to dry overnight and/or one full day.  Patient may purchase OTC Gold Bond Powder and use to his/her shoes daily prior to putting them on. He/She may also sprinkle a slight  amount of powder to the foot prior to putting on socks.        Future Appointments   Date Time Provider Department Center   8/19/2019  1:40 PM Kings Carlton DPM [facility]   9/3/2019  4:00 PM [facility] [address]   1/9/2020  4:00 PM [facility] [address]

## 2019-08-29 ENCOUNTER — TELEPHONE (OUTPATIENT)
Dept: FAMILY MEDICINE | Facility: CLINIC | Age: 39
End: 2019-08-29

## 2019-08-29 NOTE — TELEPHONE ENCOUNTER
Called pt   Pt requesting depo test shot 5 days early  Explained to pt that shot can not be given early  Pt asked about 9/2/19 office will be closed  Pt disconnected line

## 2019-08-29 NOTE — TELEPHONE ENCOUNTER
----- Message from Jackie Camejo sent at 8/29/2019  4:25 PM CDT -----  Contact: [phone]   Pt would like a call from nurse to reschedule nurse visit. Pt is requesting to come in tomorrow 8/29/19. Please call back at [phone].       Thank You,   Jackie Camejo

## 2019-09-03 ENCOUNTER — CLINICAL SUPPORT (OUTPATIENT)
Dept: FAMILY MEDICINE | Facility: CLINIC | Age: 39
End: 2019-09-03
Payer: COMMERCIAL

## 2019-09-03 PROCEDURE — 96372 PR INJECTION,THERAP/PROPH/DIAG2ST, IM OR SUBCUT: ICD-10-PCS | Mod: S$GLB,,, | Performed by: INTERNAL MEDICINE

## 2019-09-03 PROCEDURE — 96372 THER/PROPH/DIAG INJ SC/IM: CPT | Mod: S$GLB,,, | Performed by: INTERNAL MEDICINE

## 2019-09-03 PROCEDURE — 99999 PR PBB SHADOW E&M-EST. PATIENT-LVL I: CPT | Mod: PBBFAC,,,

## 2019-09-03 PROCEDURE — 99999 PR PBB SHADOW E&M-EST. PATIENT-LVL I: ICD-10-PCS | Mod: PBBFAC,,,

## 2019-09-03 RX ADMIN — TESTOSTERONE CYPIONATE 200 MG: 200 INJECTION, SOLUTION INTRAMUSCULAR at 09:09

## 2019-09-17 RX ORDER — VALACYCLOVIR HYDROCHLORIDE 1 G/1
TABLET, FILM COATED ORAL
Qty: 10 TABLET | Refills: 0 | Status: SHIPPED | OUTPATIENT
Start: 2019-09-17 | End: 2019-10-28 | Stop reason: SDUPTHER

## 2019-09-23 ENCOUNTER — TELEPHONE (OUTPATIENT)
Dept: FAMILY MEDICINE | Facility: CLINIC | Age: 39
End: 2019-09-23

## 2019-09-23 NOTE — TELEPHONE ENCOUNTER
----- Message from Manjit Vanegas sent at 9/23/2019 12:57 PM CDT -----  The pt is seeking a call back from the staff of Dr. Jefferson concerning his billing.    The is being charged a doctor's visit for a nurse visit he received.  The pt can be reached at [phone]

## 2019-09-23 NOTE — TELEPHONE ENCOUNTER
Gave patient the codes associated with the visit and instructed him to call insurance and ask which code is causing the charge. Pt voiced understanding and will call us back.

## 2019-09-23 NOTE — TELEPHONE ENCOUNTER
Pt states he is charged for dr visit   Comes in as nurse visit for test inj      Pt states [facility] should take care of it because its their mistake   Called billing----told to call his insurance  Called insurance----- insurance told pt to call whoever is coding is putting as dr visit

## 2019-10-11 ENCOUNTER — CLINICAL SUPPORT (OUTPATIENT)
Dept: FAMILY MEDICINE | Facility: CLINIC | Age: 39
End: 2019-10-11
Payer: COMMERCIAL

## 2019-10-11 PROCEDURE — 99999 PR PBB SHADOW E&M-EST. PATIENT-LVL I: CPT | Mod: PBBFAC,,,

## 2019-10-11 PROCEDURE — 99999 PR PBB SHADOW E&M-EST. PATIENT-LVL I: ICD-10-PCS | Mod: PBBFAC,,,

## 2019-10-11 PROCEDURE — 96372 THER/PROPH/DIAG INJ SC/IM: CPT | Mod: S$GLB,,, | Performed by: INTERNAL MEDICINE

## 2019-10-11 PROCEDURE — 96372 PR INJECTION,THERAP/PROPH/DIAG2ST, IM OR SUBCUT: ICD-10-PCS | Mod: S$GLB,,, | Performed by: INTERNAL MEDICINE

## 2019-10-11 RX ADMIN — TESTOSTERONE CYPIONATE 200 MG: 200 INJECTION, SOLUTION INTRAMUSCULAR at 09:10

## 2019-10-11 NOTE — PROGRESS NOTES
Patient tolerated injection well in right ventrogluteal. I asked patient to wait in the clinic for 15 minutes before leaving to verify no adverse reactions. Patient verbally verified understanding./[email or web address]

## 2019-10-28 RX ORDER — VALACYCLOVIR HYDROCHLORIDE 1 G/1
TABLET, FILM COATED ORAL
Qty: 10 TABLET | Refills: 0 | Status: SHIPPED | OUTPATIENT
Start: 2019-10-28 | End: 2019-11-22 | Stop reason: SDUPTHER

## 2019-11-22 RX ORDER — VALACYCLOVIR HYDROCHLORIDE 1 G/1
TABLET, FILM COATED ORAL
Qty: 10 TABLET | Refills: 0 | Status: SHIPPED | OUTPATIENT
Start: 2019-11-22 | End: 2019-12-09 | Stop reason: SDUPTHER

## 2019-12-09 RX ORDER — VALACYCLOVIR HYDROCHLORIDE 1 G/1
TABLET, FILM COATED ORAL
Qty: 10 TABLET | Refills: 0 | Status: SHIPPED | OUTPATIENT
Start: 2019-12-09 | End: 2020-01-02

## 2020-01-02 RX ORDER — VALACYCLOVIR HYDROCHLORIDE 1 G/1
TABLET, FILM COATED ORAL
Qty: 10 TABLET | Refills: 0 | Status: SHIPPED | OUTPATIENT
Start: 2020-01-02 | End: 2020-01-09 | Stop reason: SDUPTHER

## 2020-01-09 ENCOUNTER — LAB VISIT (OUTPATIENT)
Dept: LAB | Facility: HOSPITAL | Age: 40
End: 2020-01-09
Attending: INTERNAL MEDICINE
Payer: COMMERCIAL

## 2020-01-09 ENCOUNTER — OFFICE VISIT (OUTPATIENT)
Dept: FAMILY MEDICINE | Facility: CLINIC | Age: 40
End: 2020-01-09
Payer: COMMERCIAL

## 2020-01-09 VITALS
BODY MASS INDEX: 32.52 KG/M2 | TEMPERATURE: 98 F | DIASTOLIC BLOOD PRESSURE: 78 MMHG | OXYGEN SATURATION: 98 % | SYSTOLIC BLOOD PRESSURE: 121 MMHG | WEIGHT: 213.88 LBS | HEART RATE: 103 BPM

## 2020-01-09 DIAGNOSIS — Z41.2 ENCOUNTER FOR CIRCUMCISION: ICD-10-CM

## 2020-01-09 DIAGNOSIS — Z00.00 ROUTINE ADULT HEALTH MAINTENANCE: Primary | ICD-10-CM

## 2020-01-09 DIAGNOSIS — Z00.00 ROUTINE ADULT HEALTH MAINTENANCE: ICD-10-CM

## 2020-01-09 LAB
ALBUMIN SERPL BCP-MCNC: 4.3 G/DL (ref 3.5–5.2)
ALP SERPL-CCNC: 61 U/L (ref 55–135)
ALT SERPL W/O P-5'-P-CCNC: 19 U/L (ref 10–44)
ANION GAP SERPL CALC-SCNC: 9 MMOL/L (ref 8–16)
AST SERPL-CCNC: 24 U/L (ref 10–40)
BASOPHILS # BLD AUTO: 0.03 K/UL (ref 0–0.2)
BASOPHILS NFR BLD: 0.4 % (ref 0–1.9)
BILIRUB SERPL-MCNC: 0.7 MG/DL (ref 0.1–1)
BUN SERPL-MCNC: 29 MG/DL (ref 6–20)
CALCIUM SERPL-MCNC: 9.3 MG/DL (ref 8.7–10.5)
CHLORIDE SERPL-SCNC: 105 MMOL/L (ref 95–110)
CHOLEST SERPL-MCNC: 251 MG/DL (ref 120–199)
CHOLEST/HDLC SERPL: 4.5 {RATIO} (ref 2–5)
CO2 SERPL-SCNC: 26 MMOL/L (ref 23–29)
CREAT SERPL-MCNC: 1.7 MG/DL (ref 0.5–1.4)
DIFFERENTIAL METHOD: ABNORMAL
EOSINOPHIL # BLD AUTO: 0.1 K/UL (ref 0–0.5)
EOSINOPHIL NFR BLD: 0.9 % (ref 0–8)
ERYTHROCYTE [DISTWIDTH] IN BLOOD BY AUTOMATED COUNT: 13.8 % (ref 11.5–14.5)
EST. GFR  (AFRICAN AMERICAN): 58.3 ML/MIN/1.73 M^2
EST. GFR  (NON AFRICAN AMERICAN): 50.4 ML/MIN/1.73 M^2
GLUCOSE SERPL-MCNC: 97 MG/DL (ref 70–110)
HCT VFR BLD AUTO: 40 % (ref 40–54)
HDLC SERPL-MCNC: 56 MG/DL (ref 40–75)
HDLC SERPL: 22.3 % (ref 20–50)
HGB BLD-MCNC: 12.9 G/DL (ref 14–18)
IMM GRANULOCYTES # BLD AUTO: 0.01 K/UL (ref 0–0.04)
IMM GRANULOCYTES NFR BLD AUTO: 0.1 % (ref 0–0.5)
LDLC SERPL CALC-MCNC: 161.2 MG/DL (ref 63–159)
LYMPHOCYTES # BLD AUTO: 2.5 K/UL (ref 1–4.8)
LYMPHOCYTES NFR BLD: 35.8 % (ref 18–48)
MCH RBC QN AUTO: 30.1 PG (ref 27–31)
MCHC RBC AUTO-ENTMCNC: 32.3 G/DL (ref 32–36)
MCV RBC AUTO: 94 FL (ref 82–98)
MONOCYTES # BLD AUTO: 0.9 K/UL (ref 0.3–1)
MONOCYTES NFR BLD: 12.4 % (ref 4–15)
NEUTROPHILS # BLD AUTO: 3.5 K/UL (ref 1.8–7.7)
NEUTROPHILS NFR BLD: 50.4 % (ref 38–73)
NONHDLC SERPL-MCNC: 195 MG/DL
NRBC BLD-RTO: 0 /100 WBC
PLATELET # BLD AUTO: 204 K/UL (ref 150–350)
PMV BLD AUTO: 12.4 FL (ref 9.2–12.9)
POTASSIUM SERPL-SCNC: 3.8 MMOL/L (ref 3.5–5.1)
PROT SERPL-MCNC: 7.8 G/DL (ref 6–8.4)
RBC # BLD AUTO: 4.28 M/UL (ref 4.6–6.2)
SODIUM SERPL-SCNC: 140 MMOL/L (ref 136–145)
TESTOST SERPL-MCNC: 216 NG/DL (ref 304–1227)
TRIGL SERPL-MCNC: 169 MG/DL (ref 30–150)
TSH SERPL DL<=0.005 MIU/L-ACNC: 0.59 UIU/ML (ref 0.4–4)
WBC # BLD AUTO: 6.85 K/UL (ref 3.9–12.7)

## 2020-01-09 PROCEDURE — 36415 COLL VENOUS BLD VENIPUNCTURE: CPT | Mod: PO

## 2020-01-09 PROCEDURE — 80061 LIPID PANEL: CPT

## 2020-01-09 PROCEDURE — 99395 PR PREVENTIVE VISIT,EST,18-39: ICD-10-PCS | Mod: S$GLB,,, | Performed by: INTERNAL MEDICINE

## 2020-01-09 PROCEDURE — 84403 ASSAY OF TOTAL TESTOSTERONE: CPT

## 2020-01-09 PROCEDURE — 86803 HEPATITIS C AB TEST: CPT

## 2020-01-09 PROCEDURE — 84443 ASSAY THYROID STIM HORMONE: CPT

## 2020-01-09 PROCEDURE — 85025 COMPLETE CBC W/AUTO DIFF WBC: CPT

## 2020-01-09 PROCEDURE — 99395 PREV VISIT EST AGE 18-39: CPT | Mod: S$GLB,,, | Performed by: INTERNAL MEDICINE

## 2020-01-09 PROCEDURE — 83036 HEMOGLOBIN GLYCOSYLATED A1C: CPT

## 2020-01-09 PROCEDURE — 99999 PR PBB SHADOW E&M-EST. PATIENT-LVL IV: ICD-10-PCS | Mod: PBBFAC,,, | Performed by: INTERNAL MEDICINE

## 2020-01-09 PROCEDURE — 86703 HIV-1/HIV-2 1 RESULT ANTBDY: CPT

## 2020-01-09 PROCEDURE — 99999 PR PBB SHADOW E&M-EST. PATIENT-LVL IV: CPT | Mod: PBBFAC,,, | Performed by: INTERNAL MEDICINE

## 2020-01-09 PROCEDURE — 80053 COMPREHEN METABOLIC PANEL: CPT

## 2020-01-09 RX ORDER — VALACYCLOVIR HYDROCHLORIDE 1 G/1
1000 TABLET, FILM COATED ORAL 2 TIMES DAILY PRN
Qty: 30 TABLET | Refills: 2 | Status: SHIPPED | OUTPATIENT
Start: 2020-01-09 | End: 2020-12-26

## 2020-01-09 NOTE — PROGRESS NOTES
Subjective:       Patient ID: Sukumar Bob is a 39 y.o. male.    Chief Complaint: Annual Exam    HPI  Past Medical History:   Diagnosis Date    HSV (herpes simplex virus) infection     Mild chronic anemia      Past Surgical History:   Procedure Laterality Date    none       Family History   Problem Relation Age of Onset    No Known Problems Mother     No Known Problems Father     No Known Problems Sister     No Known Problems Brother     Hypertension Maternal Grandmother     Stroke Maternal Grandmother     No Known Problems Sister     No Known Problems Brother     No Known Problems Son     No Known Problems Son     Throat cancer Cousin     Diabetes Neg Hx     Heart disease Neg Hx      Social History     Socioeconomic History    Marital status: Single     Spouse name: Not on file    Number of children: 2    Years of education: Not on file    Highest education level: Not on file   Occupational History    Occupation:      Comment: [employer]   Social Needs    Financial resource strain: Not on file    Food insecurity:     Worry: Not on file     Inability: Not on file    Transportation needs:     Medical: Not on file     Non-medical: Not on file   Tobacco Use    Smoking status: Never Smoker    Smokeless tobacco: Never Used   Substance and Sexual Activity    Alcohol use: Yes     Comment: socially    Drug use: No    Sexual activity: Yes     Partners: Female   Lifestyle    Physical activity:     Days per week: Not on file     Minutes per session: Not on file    Stress: Not on file   Relationships    Social connections:     Talks on phone: Not on file     Gets together: Not on file     Attends Zoroastrianism service: Not on file     Active member of club or organization: Not on file     Attends meetings of clubs or organizations: Not on file     Relationship status: Not on file   Other Topics Concern    Not on file   Social History Narrative    He wears  seatbelt. He is expecting a daughter soon.     Review of Systems   Constitutional: Negative for activity change, appetite change, chills, diaphoresis, fatigue, fever and unexpected weight change.   HENT: Negative for drooling, ear discharge, ear pain, facial swelling, hearing loss, mouth sores, nosebleeds, postnasal drip, rhinorrhea, sinus pressure, sneezing, sore throat, tinnitus, trouble swallowing and voice change.    Eyes: Negative for photophobia, redness and visual disturbance.   Respiratory: Negative for apnea, cough, choking, chest tightness, shortness of breath and wheezing.    Cardiovascular: Negative for chest pain, palpitations and leg swelling.   Gastrointestinal: Negative for abdominal distention, abdominal pain, anal bleeding, blood in stool, constipation, diarrhea, nausea, rectal pain and vomiting.   Endocrine: Negative for cold intolerance, heat intolerance, polydipsia, polyphagia and polyuria.   Genitourinary: Negative for difficulty urinating, dysuria, enuresis, flank pain, frequency, genital sores, hematuria and urgency.   Musculoskeletal: Negative for arthralgias, back pain, gait problem, joint swelling, myalgias, neck pain and neck stiffness.   Skin: Negative for color change, pallor, rash and wound.   Allergic/Immunologic: Negative for food allergies and immunocompromised state.   Neurological: Negative for dizziness, tremors, seizures, syncope, facial asymmetry, speech difficulty, weakness, light-headedness, numbness and headaches.   Hematological: Negative for adenopathy. Does not bruise/bleed easily.   Psychiatric/Behavioral: Negative for agitation, behavioral problems, confusion, decreased concentration, dysphoric mood, hallucinations, self-injury, sleep disturbance and suicidal ideas. The patient is not nervous/anxious and is not hyperactive.        Objective:      Physical Exam   Constitutional: He is oriented to person, place, and time. He appears well-developed and well-nourished. No  distress.   HENT:   Head: Normocephalic and atraumatic.   Eyes: Pupils are equal, round, and reactive to light. No scleral icterus.   Neck: Normal range of motion. Neck supple. No JVD present. Carotid bruit is not present. No tracheal deviation present. No thyromegaly present.   Cardiovascular: Normal rate, regular rhythm, normal heart sounds and intact distal pulses.   Pulmonary/Chest: Effort normal and breath sounds normal. No respiratory distress. He has no wheezes. He has no rales. He exhibits no tenderness.   Abdominal: Soft. Bowel sounds are normal. He exhibits no distension. There is no tenderness. There is no rebound and no guarding.   Musculoskeletal: Normal range of motion. He exhibits no edema or tenderness.   Lymphadenopathy:     He has no cervical adenopathy.   Neurological: He is alert and oriented to person, place, and time. No cranial nerve deficit. Coordination normal.   Skin: Skin is warm and dry. No rash noted. He is not diaphoretic. No erythema. No pallor.   Psychiatric: He has a normal mood and affect. His behavior is normal. Judgment and thought content normal.       CMP  Sodium   Date Value Ref Range Status   01/09/2019 139 136 - 145 mmol/L Final     Potassium   Date Value Ref Range Status   01/09/2019 3.9 3.5 - 5.1 mmol/L Final     Chloride   Date Value Ref Range Status   01/09/2019 106 95 - 110 mmol/L Final     CO2   Date Value Ref Range Status   01/09/2019 26 23 - 29 mmol/L Final     Glucose   Date Value Ref Range Status   01/09/2019 91 70 - 110 mg/dL Final     BUN, Bld   Date Value Ref Range Status   01/09/2019 19 6 - 20 mg/dL Final     Creatinine   Date Value Ref Range Status   01/09/2019 1.2 0.5 - 1.4 mg/dL Final     Calcium   Date Value Ref Range Status   01/09/2019 9.2 8.7 - 10.5 mg/dL Final     Total Protein   Date Value Ref Range Status   01/09/2019 7.6 6.0 - 8.4 g/dL Final     Albumin   Date Value Ref Range Status   01/09/2019 4.0 3.5 - 5.2 g/dL Final     Total Bilirubin   Date  Value Ref Range Status   01/09/2019 0.6 0.1 - 1.0 mg/dL Final     Comment:     For infants and newborns, interpretation of results should be based  on gestational age, weight and in agreement with clinical  observations.  Premature Infant recommended reference ranges:  Up to 24 hours.............<8.0 mg/dL  Up to 48 hours............<12.0 mg/dL  3-5 days..................<15.0 mg/dL  6-29 days.................<15.0 mg/dL       Alkaline Phosphatase   Date Value Ref Range Status   01/09/2019 66 55 - 135 U/L Final     AST   Date Value Ref Range Status   01/09/2019 24 10 - 40 U/L Final     ALT   Date Value Ref Range Status   01/09/2019 16 10 - 44 U/L Final     Anion Gap   Date Value Ref Range Status   01/09/2019 7 (L) 8 - 16 mmol/L Final     eGFR if    Date Value Ref Range Status   01/09/2019 >60.0 >60 mL/min/1.73 m^2 Final     eGFR if non    Date Value Ref Range Status   01/09/2019 >60.0 >60 mL/min/1.73 m^2 Final     Comment:     Calculation used to obtain the estimated glomerular filtration  rate (eGFR) is the CKD-EPI equation.        Lab Results   Component Value Date    WBC 5.63 01/09/2019    HGB 13.2 (L) 01/09/2019    HCT 39.5 (L) 01/09/2019    MCV 90 01/09/2019     01/09/2019     Lab Results   Component Value Date    CHOL 193 01/09/2019     Lab Results   Component Value Date    HDL 47 01/09/2019     Lab Results   Component Value Date    LDLCALC 127.8 01/09/2019     Lab Results   Component Value Date    TRIG 91 01/09/2019     Lab Results   Component Value Date    CHOLHDL 24.4 01/09/2019     Lab Results   Component Value Date    TSH 0.777 01/09/2019     Lab Results   Component Value Date    HGBA1C 5.3 01/02/2018     Assessment:       1. Routine adult health maintenance    2. Encounter for circumcision        Plan:   Routine adult health maintenance  -     Comprehensive metabolic panel; Future; Expected date: 01/09/2020  -     [facility] auto differential; Future; Expected date:  01/09/2020  -     Lipid panel; Future; Expected date: 01/09/2020  -     Hemoglobin A1c; Future; Expected date: 01/09/2020  -     TSH; Future; Expected date: 01/09/2020  -     HIV 1/2 Ag/Ab (4th Gen); Future; Expected date: 01/09/2020  -     Testosterone; Future; Expected date: 01/09/2020  -     Hepatitis C antibody; Future; Expected date: 01/09/2020    Encounter for circumcision  -     Ambulatory referral to Urology    Other orders  -     valACYclovir (VALTREX) 1000 MG tablet; Take 1 tablet (1,000 mg total) by mouth 2 (two) times daily as needed.  Dispense: 30 tablet; Refill: 2    Stable---------watch diet,exercise.           F/u 1 year-

## 2020-01-10 ENCOUNTER — TELEPHONE (OUTPATIENT)
Dept: FAMILY MEDICINE | Facility: CLINIC | Age: 40
End: 2020-01-10

## 2020-01-10 ENCOUNTER — CLINICAL SUPPORT (OUTPATIENT)
Dept: FAMILY MEDICINE | Facility: CLINIC | Age: 40
End: 2020-01-10
Payer: COMMERCIAL

## 2020-01-10 DIAGNOSIS — E78.5 DYSLIPIDEMIA: Primary | ICD-10-CM

## 2020-01-10 LAB
ESTIMATED AVG GLUCOSE: 111 MG/DL (ref 68–131)
HBA1C MFR BLD HPLC: 5.5 % (ref 4–5.6)
HCV AB SERPL QL IA: NEGATIVE
HIV 1+2 AB+HIV1 P24 AG SERPL QL IA: NEGATIVE

## 2020-01-10 PROCEDURE — 96372 PR INJECTION,THERAP/PROPH/DIAG2ST, IM OR SUBCUT: ICD-10-PCS | Mod: S$GLB,,, | Performed by: INTERNAL MEDICINE

## 2020-01-10 PROCEDURE — 96372 THER/PROPH/DIAG INJ SC/IM: CPT | Mod: S$GLB,,, | Performed by: INTERNAL MEDICINE

## 2020-01-10 RX ORDER — TESTOSTERONE CYPIONATE 200 MG/ML
200 INJECTION, SOLUTION INTRAMUSCULAR
Status: DISCONTINUED | OUTPATIENT
Start: 2020-01-10 | End: 2020-06-08

## 2020-01-10 RX ADMIN — TESTOSTERONE CYPIONATE 200 MG: 200 INJECTION, SOLUTION INTRAMUSCULAR at 02:01

## 2020-01-10 NOTE — TELEPHONE ENCOUNTER
Testosterone level is low---below normal-----can restart shots----------nephro consult for renal insuff,        Chol very high-----watch diet.,----repeat cmp,lipids in 1 month-fasting.

## 2020-01-10 NOTE — TELEPHONE ENCOUNTER
Called pt  Informed of results and appts made for labs and renal  Pt advised to change diet and come back fasting for labs   Pt verbalized understanding

## 2020-01-10 NOTE — PROGRESS NOTES
Pt blayne inj well  Advised to wait 15 min and notify nurse of any reactions  Pt verbalized understanding  Given low chol diet also

## 2020-02-07 ENCOUNTER — TELEPHONE (OUTPATIENT)
Dept: FAMILY MEDICINE | Facility: CLINIC | Age: 40
End: 2020-02-07

## 2020-02-07 NOTE — TELEPHONE ENCOUNTER
----- Message from Avis Pozo sent at 2/7/2020  8:55 AM CST -----  Contact: self  Patient requesting a call back to know if he can come in earlier for his nurse visit on 02/10/2020. Please call pt back at [phone]

## 2020-02-10 ENCOUNTER — LAB VISIT (OUTPATIENT)
Dept: LAB | Facility: HOSPITAL | Age: 40
End: 2020-02-10
Attending: INTERNAL MEDICINE
Payer: COMMERCIAL

## 2020-02-10 ENCOUNTER — TELEPHONE (OUTPATIENT)
Dept: FAMILY MEDICINE | Facility: CLINIC | Age: 40
End: 2020-02-10

## 2020-02-10 ENCOUNTER — CLINICAL SUPPORT (OUTPATIENT)
Dept: FAMILY MEDICINE | Facility: CLINIC | Age: 40
End: 2020-02-10
Payer: COMMERCIAL

## 2020-02-10 DIAGNOSIS — E78.5 DYSLIPIDEMIA: ICD-10-CM

## 2020-02-10 DIAGNOSIS — M54.50 ACUTE MIDLINE LOW BACK PAIN WITHOUT SCIATICA: Primary | ICD-10-CM

## 2020-02-10 PROCEDURE — 80061 LIPID PANEL: CPT

## 2020-02-10 PROCEDURE — 36415 COLL VENOUS BLD VENIPUNCTURE: CPT | Mod: PO

## 2020-02-10 PROCEDURE — 80053 COMPREHEN METABOLIC PANEL: CPT

## 2020-02-10 PROCEDURE — 96372 THER/PROPH/DIAG INJ SC/IM: CPT | Mod: S$GLB,,, | Performed by: INTERNAL MEDICINE

## 2020-02-10 PROCEDURE — 96372 PR INJECTION,THERAP/PROPH/DIAG2ST, IM OR SUBCUT: ICD-10-PCS | Mod: S$GLB,,, | Performed by: INTERNAL MEDICINE

## 2020-02-10 RX ADMIN — TESTOSTERONE CYPIONATE 200 MG: 200 INJECTION, SOLUTION INTRAMUSCULAR at 08:02
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
## 2020-02-11 LAB
ALBUMIN SERPL BCP-MCNC: 4.2 G/DL (ref 3.5–5.2)
ALP SERPL-CCNC: 63 U/L (ref 55–135)
ALT SERPL W/O P-5'-P-CCNC: 18 U/L (ref 10–44)
ANION GAP SERPL CALC-SCNC: 13 MMOL/L (ref 8–16)
AST SERPL-CCNC: 22 U/L (ref 10–40)
BILIRUB SERPL-MCNC: 0.9 MG/DL (ref 0.1–1)
BUN SERPL-MCNC: 22 MG/DL (ref 6–20)
CALCIUM SERPL-MCNC: 9 MG/DL (ref 8.7–10.5)
CHLORIDE SERPL-SCNC: 105 MMOL/L (ref 95–110)
CHOLEST SERPL-MCNC: 198 MG/DL (ref 120–199)
CHOLEST/HDLC SERPL: 3.5 {RATIO} (ref 2–5)
CO2 SERPL-SCNC: 20 MMOL/L (ref 23–29)
CREAT SERPL-MCNC: 1.3 MG/DL (ref 0.5–1.4)
EST. GFR  (AFRICAN AMERICAN): >60 ML/MIN/1.73 M^2
EST. GFR  (NON AFRICAN AMERICAN): >60 ML/MIN/1.73 M^2
GLUCOSE SERPL-MCNC: 68 MG/DL (ref 70–110)
HDLC SERPL-MCNC: 57 MG/DL (ref 40–75)
HDLC SERPL: 28.8 % (ref 20–50)
LDLC SERPL CALC-MCNC: 121 MG/DL (ref 63–159)
NONHDLC SERPL-MCNC: 141 MG/DL
POTASSIUM SERPL-SCNC: 4.1 MMOL/L (ref 3.5–5.1)
PROT SERPL-MCNC: 7.4 G/DL (ref 6–8.4)
SODIUM SERPL-SCNC: 138 MMOL/L (ref 136–145)
TRIGL SERPL-MCNC: 100 MG/DL (ref 30–150)

## 2020-02-19 ENCOUNTER — TELEPHONE (OUTPATIENT)
Dept: PAIN MEDICINE | Facility: CLINIC | Age: 40
End: 2020-02-19

## 2020-02-19 NOTE — TELEPHONE ENCOUNTER
Tried to call pt to confirm appt for 02/20/2020 with  and pt did not answer. Left message to message to pt for if she needs to reschedule to callback clinic. Phone number given in message.

## 2020-02-20 ENCOUNTER — OFFICE VISIT (OUTPATIENT)
Dept: PAIN MEDICINE | Facility: CLINIC | Age: 40
End: 2020-02-20
Payer: COMMERCIAL

## 2020-02-20 ENCOUNTER — HOSPITAL ENCOUNTER (OUTPATIENT)
Dept: RADIOLOGY | Facility: HOSPITAL | Age: 40
Discharge: HOME | End: 2020-02-20
Attending: INTERNAL MEDICINE
Payer: COMMERCIAL

## 2020-02-20 ENCOUNTER — OFFICE VISIT (OUTPATIENT)
Dept: NEPHROLOGY | Facility: CLINIC | Age: 40
End: 2020-02-20
Payer: COMMERCIAL

## 2020-02-20 VITALS
HEART RATE: 62 BPM | DIASTOLIC BLOOD PRESSURE: 84 MMHG | SYSTOLIC BLOOD PRESSURE: 120 MMHG | HEIGHT: 68 IN | BODY MASS INDEX: 33.01 KG/M2 | WEIGHT: 217.81 LBS

## 2020-02-20 VITALS
HEIGHT: 68 IN | DIASTOLIC BLOOD PRESSURE: 84 MMHG | WEIGHT: 215.81 LBS | BODY MASS INDEX: 32.71 KG/M2 | SYSTOLIC BLOOD PRESSURE: 120 MMHG | RESPIRATION RATE: 20 BRPM | HEART RATE: 62 BPM

## 2020-02-20 DIAGNOSIS — R79.89 ELEVATED SERUM CREATININE: Primary | ICD-10-CM

## 2020-02-20 DIAGNOSIS — M79.18 MYALGIA, OTHER SITE: ICD-10-CM

## 2020-02-20 DIAGNOSIS — R79.89 ELEVATED SERUM CREATININE: ICD-10-CM

## 2020-02-20 DIAGNOSIS — M79.18 MYOFASCIAL PAIN: ICD-10-CM

## 2020-02-20 DIAGNOSIS — M75.22 BICEPS TENDONITIS ON LEFT: ICD-10-CM

## 2020-02-20 DIAGNOSIS — R20.0 BILATERAL HAND NUMBNESS: ICD-10-CM

## 2020-02-20 DIAGNOSIS — M25.531 RIGHT WRIST PAIN: ICD-10-CM

## 2020-02-20 DIAGNOSIS — M54.50 ACUTE MIDLINE LOW BACK PAIN WITHOUT SCIATICA: ICD-10-CM
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
## 2020-02-20 DIAGNOSIS — M47.816 SPONDYLOSIS WITHOUT MYELOPATHY OR RADICULOPATHY, LUMBAR REGION: Primary | ICD-10-CM

## 2020-02-20 PROCEDURE — 99203 OFFICE O/P NEW LOW 30 MIN: CPT | Mod: 25,,, | Performed by: INTERNAL MEDICINE

## 2020-02-20 PROCEDURE — 99999 PR PBB SHADOW E&M-EST. PATIENT-LVL IV: ICD-10-PCS | Mod: PBBFAC,,, | Performed by: INTERNAL MEDICINE

## 2020-02-20 PROCEDURE — 76770 US EXAM ABDO BACK WALL COMP: CPT | Mod: 26,,, | Performed by: RADIOLOGY

## 2020-02-20 PROCEDURE — 99999 PR PBB SHADOW E&M-EST. PATIENT-LVL IV: CPT | Mod: PBBFAC,,, | Performed by: INTERNAL MEDICINE

## 2020-02-20 PROCEDURE — 76775 US EXAM ABDO BACK WALL LIM: CPT | Mod: S$GLB,,, | Performed by: INTERNAL MEDICINE

## 2020-02-20 PROCEDURE — 99244 OFF/OP CNSLTJ NEW/EST MOD 40: CPT | Mod: S$GLB,,, | Performed by: PHYSICAL MEDICINE & REHABILITATION

## 2020-02-20 PROCEDURE — 99244 PR OFFICE CONSULTATION,LEVEL IV: ICD-10-PCS | Mod: S$GLB,,, | Performed by: PHYSICAL MEDICINE & REHABILITATION

## 2020-02-20 PROCEDURE — 76770 US EXAM ABDO BACK WALL COMP: CPT | Mod: TC

## 2020-02-20 PROCEDURE — 99203 PR OFFICE/OUTPT VISIT, NEW, LEVL III, 30-44 MIN: ICD-10-PCS | Mod: 25,,, | Performed by: INTERNAL MEDICINE

## 2020-02-20 PROCEDURE — 99999 PR PBB SHADOW E&M-EST. PATIENT-LVL III: ICD-10-PCS | Mod: PBBFAC,,, | Performed by: PHYSICAL MEDICINE & REHABILITATION

## 2020-02-20 PROCEDURE — 99999 PR PBB SHADOW E&M-EST. PATIENT-LVL III: CPT | Mod: PBBFAC,,, | Performed by: PHYSICAL MEDICINE & REHABILITATION

## 2020-02-20 PROCEDURE — 3008F BODY MASS INDEX DOCD: CPT | Mod: CPTII,S$GLB,, | Performed by: INTERNAL MEDICINE

## 2020-02-20 PROCEDURE — 3008F PR BODY MASS INDEX (BMI) DOCUMENTED: ICD-10-PCS | Mod: CPTII,S$GLB,, | Performed by: INTERNAL MEDICINE

## 2020-02-20 PROCEDURE — 76775 PR  US, RETROPERITNL ABD,  LTD: ICD-10-PCS | Mod: S$GLB,,, | Performed by: INTERNAL MEDICINE

## 2020-02-20 PROCEDURE — 76770 US RETROPERITONEAL COMPLETE: ICD-10-PCS | Mod: 26,,, | Performed by: RADIOLOGY

## 2020-02-20 RX ORDER — METHYLPREDNISOLONE 4 MG/1
TABLET ORAL
Qty: 1 PACKAGE | Refills: 0 | Status: SHIPPED | OUTPATIENT
Start: 2020-02-20 | End: 2021-01-07

## 2020-02-20 RX ORDER — CYCLOBENZAPRINE HCL 10 MG
TABLET ORAL
COMMUNITY
Start: 2019-12-18 | End: 2021-01-07

## 2020-02-20 RX ORDER — METHOCARBAMOL 500 MG/1
500 TABLET, FILM COATED ORAL 3 TIMES DAILY PRN
Qty: 90 TABLET | Refills: 1 | Status: SHIPPED | OUTPATIENT
Start: 2020-02-20 | End: 2021-01-07

## 2020-02-20 RX ORDER — PREDNISONE 20 MG/1
TABLET ORAL
COMMUNITY
Start: 2019-12-24 | End: 2021-01-07

## 2020-02-20 RX ORDER — DICLOFENAC SODIUM 10 MG/G
2 GEL TOPICAL 4 TIMES DAILY PRN
Qty: 5 TUBE | Refills: 0 | Status: SHIPPED | OUTPATIENT
Start: 2020-02-20 | End: 2022-03-23

## 2020-02-20 NOTE — LETTER
February 20, 2020      Jet Jefferson MD  [address] [place] [address]           [facility]  [address]  [place] [address]  Phone: [phone]  Fax: [phone]          Patient: Sukumar Bob   MR Number: [id number]   YOB: 1980   Date of Visit: 2/20/2020       Dear Dr. Jet Jefferson:    Thank you for referring Sukumar Bob to me for evaluation. Attached you will find relevant portions of my assessment and plan of care.    If you have questions, please do not hesitate to call me. I look forward to following Sukumar Bob along with you.    Sincerely,    Steven Marley MD    Enclosure  CC:  No Recipients    If you would like to receive this communication electronically, please contact [email or web address] or [phone] to request more information on [employer] access.    For providers and/or their staff who would like to refer a patient to [facility], please contact us through our one-stop-shop provider referral line, [facility], at [phone].    If you feel you have received this communication in error or would no longer like to receive these types of communications, please e-mail [email or web address]

## 2020-02-20 NOTE — PATIENT INSTRUCTIONS
- Start medrol dose pack as instructed  - Start Robaxin 500mg up to 3 x daily as needed for muscle pain, try at night first and monitor response as it may be sedating.   - add Voltaren gel to apply to left anterior shoulder and right wrist as needed up to 3 times a day for pain in these areas.  - Continue current medications as prescribed.  - Continue home based exercises and discussed the importance of a healthy and active lifestyle  - Return for follow up in 6 weeks.    Please do not hesitate to contact the clinic [phone] if you have any questions regarding your treatment plan.     Abraham Mo MD  Interventional Pain Medicine  [facility] [place]
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
## 2020-02-20 NOTE — LETTER
February 20, 2020      Jet Jefferson MD  [address] [place] [address]           [facility]  [address]  [place] [address]  Phone: [phone]  Fax: [phone]          Patient: Sukumar Bob   MR Number: [id number]   YOB: 1980   Date of Visit: 2/20/2020       Dear Dr. Jet Jefferson:    Thank you for referring Sukumar Bob to me for evaluation. Attached you will find relevant portions of my assessment and plan of care.    If you have questions, please do not hesitate to call me. I look forward to following Sukumar Bob along with you.    Sincerely,    Abraham Mo MD    Enclosure  CC:  No Recipients    If you would like to receive this communication electronically, please contact [email or web address] or [phone] to request more information on [employer] access.    For providers and/or their staff who would like to refer a patient to [facility], please contact us through our one-stop-shop provider referral line, [facility] , at [phone].    If you feel you have received this communication in error or would no longer like to receive these types of communications, please e-mail [email or web address]

## 2020-02-20 NOTE — PROGRESS NOTES
"Subjective:       Patient ID: Sukumar Bob is a 39 y.o. Black or  male who presents for new evaluation of elevated creatinine    HPI     Patient is a 39-year-old male with history of low testosterone levels on testosterone injections every 4 weeks.  Patient has had off and on nonsteroidals.  Patient's baseline creatinine has been fluctuating between 1.3 and 1.7 in the past 3 years.  Never had any proteinuria.    February 2020 evaluated for elevated creatinine.  Patient has a high muscle mass. Workup ordered    Review of Systems   Constitutional: Negative.  Negative for activity change, appetite change, chills, diaphoresis, fatigue and fever.   HENT: Negative.  Negative for congestion and trouble swallowing.    Eyes: Negative.    Respiratory: Negative.  Negative for cough, chest tightness, shortness of breath and wheezing.    Cardiovascular: Negative.  Negative for chest pain, palpitations and leg swelling.   Gastrointestinal: Negative.  Negative for abdominal distention, abdominal pain, nausea and vomiting.   Genitourinary: Negative.  Negative for decreased urine volume, difficulty urinating, dysuria, enuresis, flank pain, frequency, hematuria, penile swelling, scrotal swelling and urgency.   Musculoskeletal: Negative.  Negative for arthralgias, back pain, joint swelling and neck pain.   Skin: Negative for rash.   Neurological: Negative.  Negative for tremors, seizures and headaches.   Psychiatric/Behavioral: Negative.  Negative for confusion and sleep disturbance. The patient is not nervous/anxious.    All other systems reviewed and are negative.      Objective:   /84   Pulse 62   Resp 20   Ht 5' 8" (1.727 m)   Wt 97.9 kg (215 lb 13.3 oz)   BMI 32.82 kg/m²      Physical Exam   Constitutional: He is oriented to person, place, and time. He appears well-developed and well-nourished. No distress.   HENT:   Head: Normocephalic.   Eyes: Pupils are equal, round, and reactive to " light. EOM are normal.   Neck: Normal range of motion. Neck supple. No JVD present. No thyromegaly present.   Cardiovascular: Normal rate, regular rhythm, S1 normal, S2 normal, normal heart sounds and intact distal pulses. PMI is not displaced. Exam reveals no gallop and no friction rub.   No murmur heard.  Pulmonary/Chest: Effort normal and breath sounds normal. No respiratory distress. He has no wheezes. He has no rales. He exhibits no tenderness.   Abdominal: He exhibits no distension and no mass. There is no hepatosplenomegaly. There is no tenderness. There is no rebound and no CVA tenderness. No hernia.   Musculoskeletal: Normal range of motion. He exhibits no edema or tenderness.   Lymphadenopathy:     He has no cervical adenopathy.   Neurological: He is alert and oriented to person, place, and time. He has normal reflexes. He is not disoriented. He displays normal reflexes. No cranial nerve deficit. He exhibits normal muscle tone. Coordination normal.   Skin: Skin is warm and dry. No rash noted. No erythema.   Psychiatric: He has a normal mood and affect. His behavior is normal. Judgment and thought content normal.                       The bilateral kidneys are normal in size, echotexture, and cortical thickness. No evidence of hydronephrosis, solid mass, or calculus. No perinephric fluid collections.    The right kidney measures 12 cm in length.    The left kidney measures 11 cm in length.    The urinary bladder is within normal limits.    Impression: Normal renal ultrasound without evidence of hydronephrosis    Lab Results   Component Value Date    CREATININE 1.3 02/20/2020    BUN 11 02/20/2020     02/20/2020    K 4.3 02/20/2020     02/20/2020    CO2 28 02/20/2020     Lab Results   Component Value Date    WBC 4.15 02/20/2020    HGB 13.2 (L) 02/20/2020    HCT 40.1 02/20/2020    MCV 93 02/20/2020     02/20/2020     Lab Results   Component Value Date    CALCIUM 8.8 02/20/2020    PHOS 2.7  02/20/2020     @RESUFAST(URICACID)    Assessment:    )    1. Elevated serum creatinine        Plan:         Check cystatin C for accurate GFR.  Check for proteinuria.  Avoid nonsteroidals.  Follow-up in 6 months

## 2020-03-05 ENCOUNTER — TELEPHONE (OUTPATIENT)
Dept: PAIN MEDICINE | Facility: CLINIC | Age: 40
End: 2020-03-05

## 2020-03-05 ENCOUNTER — PATIENT MESSAGE (OUTPATIENT)
Dept: PAIN MEDICINE | Facility: CLINIC | Age: 40
End: 2020-03-05

## 2020-03-05 NOTE — TELEPHONE ENCOUNTER
Tried to call to re schedule appt for 03/31 due to  being in procedures in am . No answer. Left vm and left pt portal message.

## 2020-03-06 ENCOUNTER — CLINICAL SUPPORT (OUTPATIENT)
Dept: FAMILY MEDICINE | Facility: CLINIC | Age: 40
End: 2020-03-06
Payer: COMMERCIAL

## 2020-03-06 PROCEDURE — 99999 PR PBB SHADOW E&M-EST. PATIENT-LVL I: CPT | Mod: PBBFAC,,,

## 2020-03-06 PROCEDURE — 96372 THER/PROPH/DIAG INJ SC/IM: CPT | Mod: S$GLB,,, | Performed by: INTERNAL MEDICINE

## 2020-03-06 PROCEDURE — 96372 PR INJECTION,THERAP/PROPH/DIAG2ST, IM OR SUBCUT: ICD-10-PCS | Mod: S$GLB,,, | Performed by: INTERNAL MEDICINE

## 2020-03-06 PROCEDURE — 99999 PR PBB SHADOW E&M-EST. PATIENT-LVL I: ICD-10-PCS | Mod: PBBFAC,,,

## 2020-03-06 RX ADMIN — TESTOSTERONE CYPIONATE 200 MG: 200 INJECTION, SOLUTION INTRAMUSCULAR at 08:03

## 2020-03-11 ENCOUNTER — TELEPHONE (OUTPATIENT)
Dept: PAIN MEDICINE | Facility: CLINIC | Age: 40
End: 2020-03-11

## 2020-03-23 ENCOUNTER — TELEPHONE (OUTPATIENT)
Dept: PAIN MEDICINE | Facility: CLINIC | Age: 40
End: 2020-03-23

## 2020-03-26 ENCOUNTER — TELEPHONE (OUTPATIENT)
Dept: FAMILY MEDICINE | Facility: CLINIC | Age: 40
End: 2020-03-26

## 2020-03-26 NOTE — TELEPHONE ENCOUNTER
I spoke to pt--has bumpy rash trunk/ arms--itchy-advised to take benadryl for several days--------call if persists------

## 2020-03-26 NOTE — TELEPHONE ENCOUNTER
----- Message from Nayana Kc sent at 3/26/2020  3:19 PM CDT -----  Contact: pt  Pt stated that he has a rash on arms, side, back , and chest and would like to come in or get something called in for him. Pt can be reached at [phone]      Thanks,  Nayana Kc

## 2020-03-26 NOTE — TELEPHONE ENCOUNTER
Pt has a rash all over bidy   Red little bumps   Neck back side and arms    Declines tele health   Would like something called in

## 2020-05-18 ENCOUNTER — TELEPHONE (OUTPATIENT)
Dept: FAMILY MEDICINE | Facility: CLINIC | Age: 40
End: 2020-05-18

## 2020-05-18 RX ORDER — DOXYCYCLINE HYCLATE 100 MG
100 TABLET ORAL 2 TIMES DAILY
Qty: 20 TABLET | Refills: 0 | Status: SHIPPED | OUTPATIENT
Start: 2020-05-18 | End: 2020-05-28

## 2020-05-18 NOTE — TELEPHONE ENCOUNTER
----- Message from Adelfo Camejo sent at 5/18/2020  1:24 PM CDT -----  ..Type:  Patient Returning Call    Who Called: pt  Who Left Message for Patient:  Does the patient know what this is regarding?: personal issues   Would the patient rather a call back or a response via "[employer]ner?  Call back   Best Call Back Number: [phone]  Additional Information:Pt is requesting a call from nurse to discuss personal issues

## 2020-05-18 NOTE — TELEPHONE ENCOUNTER
States wife ph balance was off   mirana ruptured ovaries   Was given abx ---doesn't know wich one    Wants ABX called  for himself also  Says he is not having discharge  No burning   Does have little sharp pain in groin area

## 2020-05-29 ENCOUNTER — TELEPHONE (OUTPATIENT)
Dept: UROLOGY | Facility: CLINIC | Age: 40
End: 2020-05-29

## 2020-05-29 NOTE — TELEPHONE ENCOUNTER
----- Message from Shreya Persaud sent at 5/29/2020  9:50 AM CDT -----  Contact: pt   Would like to consult with nurse to see can he get appt moved to June 26th, please give a call back at [phone].             Thanks,  Shreya VALIENTE

## 2020-06-08 ENCOUNTER — TELEPHONE (OUTPATIENT)
Dept: FAMILY MEDICINE | Facility: CLINIC | Age: 40
End: 2020-06-08

## 2020-06-08 ENCOUNTER — CLINICAL SUPPORT (OUTPATIENT)
Dept: FAMILY MEDICINE | Facility: CLINIC | Age: 40
End: 2020-06-08
Payer: COMMERCIAL

## 2020-06-08 PROCEDURE — 99999 PR PBB SHADOW E&M-EST. PATIENT-LVL I: ICD-10-PCS | Mod: PBBFAC,,,

## 2020-06-08 PROCEDURE — 96372 PR INJECTION,THERAP/PROPH/DIAG2ST, IM OR SUBCUT: ICD-10-PCS | Mod: S$GLB,,, | Performed by: INTERNAL MEDICINE

## 2020-06-08 PROCEDURE — 99999 PR PBB SHADOW E&M-EST. PATIENT-LVL I: CPT | Mod: PBBFAC,,,

## 2020-06-08 PROCEDURE — 96372 THER/PROPH/DIAG INJ SC/IM: CPT | Mod: S$GLB,,, | Performed by: INTERNAL MEDICINE

## 2020-06-08 RX ORDER — CLOTRIMAZOLE AND BETAMETHASONE DIPROPIONATE 10; .64 MG/G; MG/G
CREAM TOPICAL 2 TIMES DAILY
Qty: 45 G | Refills: 0 | Status: ON HOLD | OUTPATIENT
Start: 2020-06-08 | End: 2020-08-13 | Stop reason: HOSPADM

## 2020-06-08 RX ORDER — TESTOSTERONE CYPIONATE 200 MG/ML
200 INJECTION, SOLUTION INTRAMUSCULAR
Status: DISCONTINUED | OUTPATIENT
Start: 2020-06-08 | End: 2020-08-28

## 2020-06-08 RX ADMIN — TESTOSTERONE CYPIONATE 200 MG: 200 INJECTION, SOLUTION INTRAMUSCULAR at 09:06

## 2020-06-08 NOTE — TELEPHONE ENCOUNTER
----- Message from Vivienne Murcia sent at 6/8/2020  8:20 AM CDT -----  Contact: pt  The pt request a return call at [phone], no additional info given and can be reached at [phone]///[email or web address]

## 2020-06-08 NOTE — TELEPHONE ENCOUNTER
Pt states he has irritation between legs and groin area from sweating  Now it is starting to itch   Would like something called in

## 2020-07-01 ENCOUNTER — TELEPHONE (OUTPATIENT)
Dept: FAMILY MEDICINE | Facility: CLINIC | Age: 40
End: 2020-07-01

## 2020-07-01 NOTE — TELEPHONE ENCOUNTER
Ibrahima not activated; he didn't want to do it. He asked if Dr. Jefferson could just call him because he has to work tomorrow. He agreed to virtual audio visit.  I advised that call will be from a private number, so please be sure to answer.     ----- Message from Augustine Ogden sent at 7/1/2020  4:18 PM CDT -----  Regarding: patient advice  Contact: Sukumar Bob  Pt would like to touch base with a nurse    Pt can be reached at [phone]

## 2020-07-01 NOTE — TELEPHONE ENCOUNTER
Last annual: 1/9/20  Patient stayed out of town in a hotel with work. He's been feeling like something is crawling on him since he left, and he's itching a lot. He's purchased some lice remover wash from [employer] and used it in the shower on all parts of his body where he has air.   When he sweats, and at night are when it gets worse. No pain or bites found... just irritating itching.  He wants to know if there's something that can be called in to help him (like a medicated shampoo or something else).  [facility] [address]    ----- Message from Annabelle Pack sent at 7/1/2020  7:25 AM CDT -----  Regarding: refill  Contact: Patient  Patient needs to speak to nurse about a refill on medication but did not want to be specific with any information. Please call him back at [phone]

## 2020-07-02 ENCOUNTER — OFFICE VISIT (OUTPATIENT)
Dept: FAMILY MEDICINE | Facility: CLINIC | Age: 40
End: 2020-07-02
Payer: COMMERCIAL

## 2020-07-02 DIAGNOSIS — L29.9 ITCHY SKIN: Primary | ICD-10-CM

## 2020-07-02 PROCEDURE — 99213 PR OFFICE/OUTPT VISIT, EST, LEVL III, 20-29 MIN: ICD-10-PCS | Mod: 95,,, | Performed by: INTERNAL MEDICINE

## 2020-07-02 PROCEDURE — 99213 OFFICE O/P EST LOW 20 MIN: CPT | Mod: 95,,, | Performed by: INTERNAL MEDICINE

## 2020-07-02 NOTE — PROGRESS NOTES
Subjective:       Patient ID: Sukumar Bob is a 40 y.o. male.    Chief Complaint: itchy skin    The patient location is:home  The chief complaint leading to consultation is: itchy skin  Visit type: Virtual visit with audio  Total time spent with patient: 15 minutes  Each patient to whom he or she provides medical services by telemedicine is:  (1) informed of the relationship between the physician and patient and the respective role of any other health care provider with respect to management of the patient; and (2) notified that he or she may decline to receive medical services by telemedicine and may withdraw from such care at any time.    Notes: pt c/o itchy skin ever since bought mattress from someone.          Has tried lice treatment-no help---------treating for bedbugs-       Past Medical History:   Diagnosis Date    HSV (herpes simplex virus) infection     Mild chronic anemia      Past Surgical History:   Procedure Laterality Date    none       Family History   Problem Relation Age of Onset    No Known Problems Mother     No Known Problems Father     No Known Problems Sister     No Known Problems Brother     Hypertension Maternal Grandmother     Stroke Maternal Grandmother     No Known Problems Sister     No Known Problems Brother     No Known Problems Son     No Known Problems Son     Throat cancer Cousin     Diabetes Neg Hx     Heart disease Neg Hx      Social History     Socioeconomic History    Marital status: Single     Spouse name: Not on file    Number of children: 2    Years of education: Not on file    Highest education level: Not on file   Occupational History    Occupation:      Comment: [employer]   Social Needs    Financial resource strain: Not on file    Food insecurity     Worry: Not on file     Inability: Not on file    Transportation needs     Medical: Not on file     Non-medical: Not on file   Tobacco Use    Smoking status:  Never Smoker    Smokeless tobacco: Never Used   Substance and Sexual Activity    Alcohol use: Yes     Comment: socially    Drug use: No    Sexual activity: Yes     Partners: Female   Lifestyle    Physical activity     Days per week: Not on file     Minutes per session: Not on file    Stress: Not on file   Relationships    Social connections     Talks on phone: Not on file     Gets together: Not on file     Attends Orthodox service: Not on file     Active member of club or organization: Not on file     Attends meetings of clubs or organizations: Not on file     Relationship status: Not on file   Other Topics Concern    Not on file   Social History Narrative    He wears seatbelt. He is expecting a daughter soon.     Review of Systems   Constitutional: Negative for chills and fever.   HENT: Negative.    Respiratory: Negative for apnea, cough, choking, chest tightness, shortness of breath, wheezing and stridor.    Cardiovascular: Negative for chest pain and palpitations.   Gastrointestinal: Negative for abdominal pain, nausea and vomiting.   Genitourinary: Negative for dysuria and urgency.   Musculoskeletal: Negative for back pain.   Neurological: Negative for dizziness, weakness and light-headedness.   Psychiatric/Behavioral: Negative for agitation, behavioral problems and confusion.       Objective:      Physical Exam  Neurological:      Mental Status: He is oriented to person, place, and time.         CMP  Sodium   Date Value Ref Range Status   02/20/2020 141 136 - 145 mmol/L Final     Potassium   Date Value Ref Range Status   02/20/2020 4.3 3.5 - 5.1 mmol/L Final     Chloride   Date Value Ref Range Status   02/20/2020 107 95 - 110 mmol/L Final     CO2   Date Value Ref Range Status   02/20/2020 28 23 - 29 mmol/L Final     Glucose   Date Value Ref Range Status   02/20/2020 94 70 - 110 mg/dL Final     BUN, Bld   Date Value Ref Range Status   02/20/2020 11 6 - 20 mg/dL Final     Creatinine   Date Value Ref  Range Status   02/20/2020 1.3 0.5 - 1.4 mg/dL Final     Calcium   Date Value Ref Range Status   02/20/2020 8.8 8.7 - 10.5 mg/dL Final     Total Protein   Date Value Ref Range Status   02/10/2020 7.4 6.0 - 8.4 g/dL Final     Albumin   Date Value Ref Range Status   02/20/2020 3.9 3.5 - 5.2 g/dL Final     Total Bilirubin   Date Value Ref Range Status   02/10/2020 0.9 0.1 - 1.0 mg/dL Final     Comment:     For infants and newborns, interpretation of results should be based  on gestational age, weight and in agreement with clinical  observations.  Premature Infant recommended reference ranges:  Up to 24 hours.............<8.0 mg/dL  Up to 48 hours............<12.0 mg/dL  3-5 days..................<15.0 mg/dL  6-29 days.................<15.0 mg/dL       Alkaline Phosphatase   Date Value Ref Range Status   02/10/2020 63 55 - 135 U/L Final     AST   Date Value Ref Range Status   02/10/2020 22 10 - 40 U/L Final     ALT   Date Value Ref Range Status   02/10/2020 18 10 - 44 U/L Final     Anion Gap   Date Value Ref Range Status   02/20/2020 6 (L) 8 - 16 mmol/L Final     eGFR if    Date Value Ref Range Status   02/20/2020 >60 >60 mL/min/1.73 m^2 Final     eGFR if non    Date Value Ref Range Status   02/20/2020 >60 >60 mL/min/1.73 m^2 Final     Comment:     Calculation used to obtain the estimated glomerular filtration  rate (eGFR) is the CKD-EPI equation.        Lab Results   Component Value Date    WBC 4.15 02/20/2020    HGB 13.2 (L) 02/20/2020    HCT 40.1 02/20/2020    MCV 93 02/20/2020     02/20/2020     Lab Results   Component Value Date    CHOL 198 02/10/2020     Lab Results   Component Value Date    HDL 57 02/10/2020     Lab Results   Component Value Date    LDLCALC 121.0 02/10/2020     Lab Results   Component Value Date    TRIG 100 02/10/2020     Lab Results   Component Value Date    CHOLHDL 28.8 02/10/2020     Lab Results   Component Value Date    TSH 0.591 01/09/2020     Lab  Results   Component Value Date    [facility] 5.5 01/09/2020     Assessment:       1. Itchy skin        Plan:   Itchy skin    Treat for bed bugs.   Call if persists-----------

## 2020-07-06 ENCOUNTER — CLINICAL SUPPORT (OUTPATIENT)
Dept: FAMILY MEDICINE | Facility: CLINIC | Age: 40
End: 2020-07-06
Payer: COMMERCIAL

## 2020-07-06 ENCOUNTER — TELEPHONE (OUTPATIENT)
Dept: UROLOGY | Facility: CLINIC | Age: 40
End: 2020-07-06

## 2020-07-06 PROCEDURE — 99999 PR PBB SHADOW E&M-EST. PATIENT-LVL I: ICD-10-PCS | Mod: PBBFAC,,,

## 2020-07-06 PROCEDURE — 96372 PR INJECTION,THERAP/PROPH/DIAG2ST, IM OR SUBCUT: ICD-10-PCS | Mod: S$GLB,,, | Performed by: INTERNAL MEDICINE

## 2020-07-06 PROCEDURE — 99999 PR PBB SHADOW E&M-EST. PATIENT-LVL I: CPT | Mod: PBBFAC,,,

## 2020-07-06 PROCEDURE — 96372 THER/PROPH/DIAG INJ SC/IM: CPT | Mod: S$GLB,,, | Performed by: INTERNAL MEDICINE

## 2020-07-06 RX ADMIN — TESTOSTERONE CYPIONATE 200 MG: 200 INJECTION, SOLUTION INTRAMUSCULAR at 08:07

## 2020-07-29 ENCOUNTER — OFFICE VISIT (OUTPATIENT)
Dept: UROLOGY | Facility: CLINIC | Age: 40
End: 2020-07-29
Payer: COMMERCIAL

## 2020-07-29 ENCOUNTER — HOSPITAL ENCOUNTER (OUTPATIENT)
Dept: CARDIOLOGY | Facility: HOSPITAL | Age: 40
Discharge: HOME | End: 2020-07-29
Attending: UROLOGY
Payer: COMMERCIAL

## 2020-07-29 ENCOUNTER — HOSPITAL ENCOUNTER (OUTPATIENT)
Dept: RADIOLOGY | Facility: HOSPITAL | Age: 40
Discharge: HOME | End: 2020-07-29
Attending: UROLOGY
Payer: COMMERCIAL

## 2020-07-29 VITALS
WEIGHT: 205.94 LBS | BODY MASS INDEX: 31.31 KG/M2 | TEMPERATURE: 94 F | SYSTOLIC BLOOD PRESSURE: 116 MMHG | DIASTOLIC BLOOD PRESSURE: 70 MMHG

## 2020-07-29 DIAGNOSIS — Z01.818 PRE-OP EVALUATION: ICD-10-CM

## 2020-07-29 DIAGNOSIS — Z03.818 ENCOUNTER FOR OBSERVATION FOR SUSPECTED EXPOSURE TO OTHER BIOLOGICAL AGENTS RULED OUT: ICD-10-CM

## 2020-07-29 DIAGNOSIS — N48.1 BALANITIS: ICD-10-CM

## 2020-07-29 DIAGNOSIS — Z01.818 PRE-OP EVALUATION: Primary | ICD-10-CM

## 2020-07-29 DIAGNOSIS — N46.9 STERILITY MALE: ICD-10-CM

## 2020-07-29 PROCEDURE — 93010 ELECTROCARDIOGRAM REPORT: CPT | Mod: ,,, | Performed by: INTERNAL MEDICINE

## 2020-07-29 PROCEDURE — 71046 XR CHEST PA AND LATERAL: ICD-10-PCS | Mod: 26,,, | Performed by: RADIOLOGY

## 2020-07-29 PROCEDURE — 71046 X-RAY EXAM CHEST 2 VIEWS: CPT | Mod: 26,,, | Performed by: RADIOLOGY

## 2020-07-29 PROCEDURE — 99999 PR PBB SHADOW E&M-EST. PATIENT-LVL V: CPT | Mod: PBBFAC,,, | Performed by: UROLOGY

## 2020-07-29 PROCEDURE — 93005 ELECTROCARDIOGRAM TRACING: CPT

## 2020-07-29 PROCEDURE — 99244 OFF/OP CNSLTJ NEW/EST MOD 40: CPT | Mod: S$GLB,,, | Performed by: UROLOGY

## 2020-07-29 PROCEDURE — 93010 EKG 12-LEAD: ICD-10-PCS | Mod: ,,, | Performed by: INTERNAL MEDICINE

## 2020-07-29 PROCEDURE — 71046 X-RAY EXAM CHEST 2 VIEWS: CPT | Mod: TC

## 2020-07-29 PROCEDURE — 99244 PR OFFICE CONSULTATION,LEVEL IV: ICD-10-PCS | Mod: S$GLB,,, | Performed by: UROLOGY

## 2020-07-29 PROCEDURE — 99999 PR PBB SHADOW E&M-EST. PATIENT-LVL V: ICD-10-PCS | Mod: PBBFAC,,, | Performed by: UROLOGY

## 2020-07-29 NOTE — H&P (VIEW-ONLY)
Chief Complaint:   Encounter Diagnoses   Name Primary?    Balanitis Yes    Sterility male        HPI:  40-year-old gentleman who reports with recurrent balanitis, having to use Lotrisone cream on multiple ocurrences now.  Patient states is also having some pain and discomfort peeling back the skin, possibly an early phimosis.  He is having no voiding complaints, no gross hematuria.  Patient has had no other urological history per his report, except for recent rise in creatinine which appears to be stabilized.  No family history of urological cancers or stones.  Patient also see today's discuss possible vasectomy.  He has 3 children, he is certain that he desires no more. He is aware of other forms of contraception. He is aware that vasectomy is to be considered permanent/irreversible.  This is a consultation from Dr. Jet Jefferson.    PATIENT HISTORY:    Past Medical History:   Diagnosis Date    HSV (herpes simplex virus) infection     Mild chronic anemia        Past Surgical History:   Procedure Laterality Date    none         Family History   Problem Relation Age of Onset    No Known Problems Mother     No Known Problems Father     No Known Problems Sister     No Known Problems Brother     Hypertension Maternal Grandmother     Stroke Maternal Grandmother     No Known Problems Sister     No Known Problems Brother     No Known Problems Son     No Known Problems Son     Throat cancer Cousin     Diabetes Neg Hx     Heart disease Neg Hx        Social History     Socioeconomic History    Marital status: Single     Spouse name: Not on file    Number of children: 2    Years of education: Not on file    Highest education level: Not on file   Occupational History    Occupation:      Comment: [employer]   Social Needs    Financial resource strain: Not on file    Food insecurity     Worry: Not on file     Inability: Not on file    Transportation needs     Medical:  Not on file     Non-medical: Not on file   Tobacco Use    Smoking status: Never Smoker    Smokeless tobacco: Never Used   Substance and Sexual Activity    Alcohol use: Yes     Comment: socially    Drug use: No    Sexual activity: Yes     Partners: Female   Lifestyle    Physical activity     Days per week: Not on file     Minutes per session: Not on file    Stress: Not on file   Relationships    Social connections     Talks on phone: Not on file     Gets together: Not on file     Attends Church service: Not on file     Active member of club or organization: Not on file     Attends meetings of clubs or organizations: Not on file     Relationship status: Not on file   Other Topics Concern    Not on file   Social History Narrative    He wears seatbelt. He is expecting a daughter soon.       Allergies:  Patient has no known allergies.    Medications:  has a current medication list which includes the following prescription(s): clotrimazole-betamethasone 1-0.05%, cyclobenzaprine, diclofenac sodium, methocarbamol, methylprednisolone, prednisone, and valacyclovir, and the following Facility-Administered Medications: testosterone cypionate.    Review of Systems:  General: No fever, chills, fatigability, or weight loss.  Skin: No rashes, itching, or changes in color or texture of skin.  Chest: Denies BECKMAN, cyanosis, wheezing, cough, and sputum production.  Abdomen: Appetite fine. No weight loss. Denies diarrhea, abdominal pain, hematemesis, or blood in stool.  Musculoskeletal: No joint stiffness or swelling. Denies back pain.  : As above.  All other review of systems negative.      Physical Exam:  Vitals:    07/29/20 0804   BP: 116/70   Temp: (!) 93.6 °F (34.2 °C)     General appearance: Well-developed, well-nourished male in no apparent distress.  Mental status: Alert and oriented. Cooperative with normal affect.  Neuro: Motor and sensory exams grossly intact.  HEENT: Normocephalic. Atraumatic.  Neck: Normal  ROM.  Chest: Non labored breathing.  Heart: Regular rate and rhythm.  Abdomen: Soft, non-distended, non-tender. No masses or organomegaly. No evidence of inguinal hernia.  Genitourinary: Penis is normal with no evidence of abnormal masses or tenderness.  Recent resolution of balanitis, frenulum with close attachment to the glans.  Epididymis, vas deferens, and cord structures are normal bilaterally.  Extremities: No cyanosis, clubbing, or edema.    Labs/Studies:   none    Impression/Plan:     1. Balanitis- due to increased frequency of infections and now discomfort and increased risk for phimosis, we have recommended pursuing circumcision.  Will plan on doing his vasectomy under the same anesthesia.    Patient understands the risks, benefits and alternatives of the above-stated procedure.  These include, but are not limited to damage to the surrounding structures including the penis, glans, urethral meatus.  Risk of finding a lesion on the foreskin or glans which will need to be biopsied or resected.  Risk of persistent pain or discomfort, possible curvature to the penis pain with erections.  Possible encroachment of the scrotal skin on the penile shaft.  The need to not perform sexual intercourse for at least 6 weeks postoperatively.  The risk of swelling and skin changes post procedure.  Patient understands that cosmetically the penis will look different.  There may be difficulty voiding post procedure, the need for a Palomino catheter.  Risk of heart attack, stroke, death, DVT and PE.  Patient understanding all of the above has elected to pursue the procedure as stated.    2. Desire for sterility- Will pursue elective vasectomy.  I discussed with the patient risks and benefits, as well as alternatives. We discussed possible complications at length, including fever, infection, bleeding (possibly requiring re-operation), testicular injury or atrophy with loss of function, chronic pain, persistent or recurrent presence  of sperm in the ejaculate, or vasal recanalization, as well as the risks attendant to the anesthetic.  We discussed that he should stop aspirin, ibuprofen, and similar products at least one week prior to the procedure. Acetaminophen is okay to use for headaches, pain, etc.  He should bring an athletic supporter and loose-fitting sweat pants on the day of the procedure.  We discussed that he must continue to use contraception until two consecutive semen analyses (checked at approximately 2-3 months post-vasectomy) reveal azoospermia.

## 2020-07-29 NOTE — LETTER
July 29, 2020      Jet Jefferson MD  [address] [place] [address]           [facility]  [address]  [place] [address]  Phone: [phone]  Fax: [phone]          Patient: Sukumar Bob   MR Number: [id number]   YOB: 1980   Date of Visit: 7/29/2020       Dear Dr. Jet Jefferson:    Thank you for referring Sukumar Bob to me for evaluation. Attached you will find relevant portions of my assessment and plan of care.    If you have questions, please do not hesitate to call me. I look forward to following Sukumar Bob along with you.    Sincerely,    Abraham Reaves MD    Enclosure  CC:  No Recipients    If you would like to receive this communication electronically, please contact [email or web address] or [phone] to request more information on [employer] access.    For providers and/or their staff who would like to refer a patient to [facility], please contact us through our one-stop-shop provider referral line, [facility] , at [phone].    If you feel you have received this communication in error or would no longer like to receive these types of communications, please e-mail [email or web address]

## 2020-07-29 NOTE — PROGRESS NOTES
Chief Complaint:   Encounter Diagnoses   Name Primary?    Balanitis Yes    Sterility male        HPI:  40-year-old gentleman who reports with recurrent balanitis, having to use Lotrisone cream on multiple ocurrences now.  Patient states is also having some pain and discomfort peeling back the skin, possibly an early phimosis.  He is having no voiding complaints, no gross hematuria.  Patient has had no other urological history per his report, except for recent rise in creatinine which appears to be stabilized.  No family history of urological cancers or stones.  Patient also see today's discuss possible vasectomy.  He has 3 children, he is certain that he desires no more. He is aware of other forms of contraception. He is aware that vasectomy is to be considered permanent/irreversible.  This is a consultation from Dr. Jet Jefferson.    PATIENT HISTORY:    Past Medical History:   Diagnosis Date    HSV (herpes simplex virus) infection     Mild chronic anemia        Past Surgical History:   Procedure Laterality Date    none         Family History   Problem Relation Age of Onset    No Known Problems Mother     No Known Problems Father     No Known Problems Sister     No Known Problems Brother     Hypertension Maternal Grandmother     Stroke Maternal Grandmother     No Known Problems Sister     No Known Problems Brother     No Known Problems Son     No Known Problems Son     Throat cancer Cousin     Diabetes Neg Hx     Heart disease Neg Hx        Social History     Socioeconomic History    Marital status: Single     Spouse name: Not on file    Number of children: 2    Years of education: Not on file    Highest education level: Not on file   Occupational History    Occupation:      Comment: [employer]   Social Needs    Financial resource strain: Not on file    Food insecurity     Worry: Not on file     Inability: Not on file    Transportation needs     Medical:  Not on file     Non-medical: Not on file   Tobacco Use    Smoking status: Never Smoker    Smokeless tobacco: Never Used   Substance and Sexual Activity    Alcohol use: Yes     Comment: socially    Drug use: No    Sexual activity: Yes     Partners: Female   Lifestyle    Physical activity     Days per week: Not on file     Minutes per session: Not on file    Stress: Not on file   Relationships    Social connections     Talks on phone: Not on file     Gets together: Not on file     Attends Cheondoism service: Not on file     Active member of club or organization: Not on file     Attends meetings of clubs or organizations: Not on file     Relationship status: Not on file   Other Topics Concern    Not on file   Social History Narrative    He wears seatbelt. He is expecting a daughter soon.       Allergies:  Patient has no known allergies.    Medications:  has a current medication list which includes the following prescription(s): clotrimazole-betamethasone 1-0.05%, cyclobenzaprine, diclofenac sodium, methocarbamol, methylprednisolone, prednisone, and valacyclovir, and the following Facility-Administered Medications: testosterone cypionate.    Review of Systems:  General: No fever, chills, fatigability, or weight loss.  Skin: No rashes, itching, or changes in color or texture of skin.  Chest: Denies BECKMAN, cyanosis, wheezing, cough, and sputum production.  Abdomen: Appetite fine. No weight loss. Denies diarrhea, abdominal pain, hematemesis, or blood in stool.  Musculoskeletal: No joint stiffness or swelling. Denies back pain.  : As above.  All other review of systems negative.      Physical Exam:  Vitals:    07/29/20 0804   BP: 116/70   Temp: (!) 93.6 °F (34.2 °C)     General appearance: Well-developed, well-nourished male in no apparent distress.  Mental status: Alert and oriented. Cooperative with normal affect.  Neuro: Motor and sensory exams grossly intact.  HEENT: Normocephalic. Atraumatic.  Neck: Normal  ROM.  Chest: Non labored breathing.  Heart: Regular rate and rhythm.  Abdomen: Soft, non-distended, non-tender. No masses or organomegaly. No evidence of inguinal hernia.  Genitourinary: Penis is normal with no evidence of abnormal masses or tenderness.  Recent resolution of balanitis, frenulum with close attachment to the glans.  Epididymis, vas deferens, and cord structures are normal bilaterally.  Extremities: No cyanosis, clubbing, or edema.    Labs/Studies:   none    Impression/Plan:     1. Balanitis- due to increased frequency of infections and now discomfort and increased risk for phimosis, we have recommended pursuing circumcision.  Will plan on doing his vasectomy under the same anesthesia.    Patient understands the risks, benefits and alternatives of the above-stated procedure.  These include, but are not limited to damage to the surrounding structures including the penis, glans, urethral meatus.  Risk of finding a lesion on the foreskin or glans which will need to be biopsied or resected.  Risk of persistent pain or discomfort, possible curvature to the penis pain with erections.  Possible encroachment of the scrotal skin on the penile shaft.  The need to not perform sexual intercourse for at least 6 weeks postoperatively.  The risk of swelling and skin changes post procedure.  Patient understands that cosmetically the penis will look different.  There may be difficulty voiding post procedure, the need for a Palomino catheter.  Risk of heart attack, stroke, death, DVT and PE.  Patient understanding all of the above has elected to pursue the procedure as stated.    2. Desire for sterility- Will pursue elective vasectomy.  I discussed with the patient risks and benefits, as well as alternatives. We discussed possible complications at length, including fever, infection, bleeding (possibly requiring re-operation), testicular injury or atrophy with loss of function, chronic pain, persistent or recurrent presence  of sperm in the ejaculate, or vasal recanalization, as well as the risks attendant to the anesthetic.  We discussed that he should stop aspirin, ibuprofen, and similar products at least one week prior to the procedure. Acetaminophen is okay to use for headaches, pain, etc.  He should bring an athletic supporter and loose-fitting sweat pants on the day of the procedure.  We discussed that he must continue to use contraception until two consecutive semen analyses (checked at approximately 2-3 months post-vasectomy) reveal azoospermia.

## 2020-08-06 ENCOUNTER — TELEPHONE (OUTPATIENT)
Dept: UROLOGY | Facility: CLINIC | Age: 40
End: 2020-08-06

## 2020-08-06 NOTE — PRE ADMISSION SCREENING
Pre op instructions reviewed with patient per phone:    To confirm, Your surgeon has instructed you:  Surgery is scheduled 8/13/20at per MD.      Please report to [facility] 1[facility] floor main lobby by per MD.       Covid 19 testing is scheduled for 8/10/20 at 0810  @ ON  Please self quarantine after Covid testing, prior to surgery      INSTRUCTIONS IMPORTANT!!!  ¨ Do not eat, drink, or smoke after 12 midnight prior to surgery, including water. OK to brush teeth, no gum, candy or mints!    ¨ Take only these medicines with a small swallow of water-morning of surgery.  none          ____   Due to COVID 19 concerns, 1 visitor will be allowed in the pre operative area, and must adhere to social distancing guidelines.  One visitor/family member is currently allowed to visit in-patient rooms from 08:00 a.m - 6:00 p.m    ____   Family/caregivers will be updated re pt status via text/cell phone      ____  Do not wear makeup, including mascara.  ____  No powder, lotions or creams to surgical area.  ____  Please remove all jewelry, including piercings and leave at home.  ____  No money or valuables needed. Please leave at home.  ____  Please bring identification and insurance information to hospital.  ____  If going home the same day, arrange for a ride home. You will not be able to   drive if Anesthesia was used.  ____  Children, under 12 years old, must remain in the waiting room with an adult.  They are not allowed in patient areas.  ____  Wear loose fitting clothing. Allow for dressings, bandages.  ____  Stop Aspirin, Ibuprofen, Motrin and Aleve at least 5-7 days before surgery, unless otherwise instructed by your doctor, or the nurse.   You MAY use Tylenol/acetaminophen until day of surgery.  ____  If you take diabetic medication, do not take am of surgery unless instructed by   Doctor.  ____ Stop taking any Fish Oil supplement or any Vitamins that contain Vitamin E at least 5 days prior to  surgery.          Bathing Instructions-- The night before surgery and the morning prior to coming to the hospital:   -Do not shave the surgical area.   -Shower and wash your hair and body as usual with anti-bacterial  soap and shampoo.   -Rinse your hair and body completely.   -Use one packet of hibiclens to wash the surgical site (using your hand) gently for 5 minutes.  Do not scrub you skin too hard.   -Do not use hibiclens on your head, face, or genitals.   -Do not wash with anti-bacterial soap after you use the hibiclens.   -Rinse your body thoroughly.   -Dry with clean, soft towel.  Do not use lotion, cream, deodorant, or powders on   the surgical site.    Use antibacterial soap in place of hibiclens if your surgery is on the head, face or genitals.         Surgical Site Infection    Prevention of surgical site infections:     -Keep incisions clean and dry.   -Do not soak/submerge incisions in water until completely healed.   -Do not apply lotions, powders, creams, or deodorants to site.   -Always make sure hands are cleaned with antibacterial soap/ alcohol-based   prior to touching the surgical site.  (This includes doctors, nurses, staff, and yourself.)    Signs and symptoms:   -Redness and pain around the area where you had surgery   -Drainage of cloudy fluid from your surgical wound   -Fever over 100.4  I have read or had read and explained to me, and understand the above information.

## 2020-08-06 NOTE — TELEPHONE ENCOUNTER
----- Message from Avis Pozo sent at 8/6/2020  2:47 PM CDT -----  Contact: pt  Pt requesting a call back regarding a procedure that he is having. Please call pt back at [phone]

## 2020-08-10 ENCOUNTER — LAB VISIT (OUTPATIENT)
Dept: OTOLARYNGOLOGY | Facility: CLINIC | Age: 40
End: 2020-08-10
Payer: COMMERCIAL

## 2020-08-10 DIAGNOSIS — Z03.818 ENCOUNTER FOR OBSERVATION FOR SUSPECTED EXPOSURE TO OTHER BIOLOGICAL AGENTS RULED OUT: ICD-10-CM

## 2020-08-10 PROCEDURE — U0003 INFECTIOUS AGENT DETECTION BY NUCLEIC ACID (DNA OR RNA); SEVERE ACUTE RESPIRATORY SYNDROME CORONAVIRUS 2 (SARS-COV-2) (CORONAVIRUS DISEASE [COVID-19]), AMPLIFIED PROBE TECHNIQUE, MAKING USE OF HIGH THROUGHPUT TECHNOLOGIES AS DESCRIBED BY CMS-2020-01-R: HCPCS

## 2020-08-11 LAB — SARS-COV-2 RNA RESP QL NAA+PROBE: NOT DETECTED

## 2020-08-12 ENCOUNTER — TELEPHONE (OUTPATIENT)
Dept: UROLOGY | Facility: CLINIC | Age: 40
End: 2020-08-12

## 2020-08-12 NOTE — TELEPHONE ENCOUNTER
Called and spoke with pt to give him his arrival time to the hospital for his surgery time.  He needs to arrive to the hospital at 6:30.  Voices understanding

## 2020-08-13 ENCOUNTER — HOSPITAL ENCOUNTER (OUTPATIENT)
Facility: HOSPITAL | Age: 40
Discharge: HOME | End: 2020-08-13
Attending: UROLOGY | Admitting: UROLOGY
Payer: COMMERCIAL

## 2020-08-13 ENCOUNTER — ANESTHESIA EVENT (OUTPATIENT)
Dept: SURGERY | Facility: HOSPITAL | Age: 40
End: 2020-08-13
Payer: COMMERCIAL

## 2020-08-13 ENCOUNTER — ANESTHESIA (OUTPATIENT)
Dept: SURGERY | Facility: HOSPITAL | Age: 40
End: 2020-08-13
Payer: COMMERCIAL

## 2020-08-13 DIAGNOSIS — N48.1 BALANITIS: ICD-10-CM

## 2020-08-13 DIAGNOSIS — N46.9 STERILITY MALE: Primary | ICD-10-CM

## 2020-08-13 PROCEDURE — 71000015 HC POSTOP RECOV 1ST HR: Performed by: UROLOGY

## 2020-08-13 PROCEDURE — 54161 CIRCUM 28 DAYS OR OLDER: CPT | Mod: 51,,, | Performed by: UROLOGY

## 2020-08-13 PROCEDURE — 25000003 PHARM REV CODE 250: Performed by: NURSE ANESTHETIST, CERTIFIED REGISTERED

## 2020-08-13 PROCEDURE — 63600175 PHARM REV CODE 636 W HCPCS: Performed by: NURSE ANESTHETIST, CERTIFIED REGISTERED

## 2020-08-13 PROCEDURE — 55250 REMOVAL OF SPERM DUCT(S): CPT | Mod: ,,, | Performed by: UROLOGY

## 2020-08-13 PROCEDURE — 25000003 PHARM REV CODE 250: Performed by: ANESTHESIOLOGY

## 2020-08-13 PROCEDURE — 25000003 PHARM REV CODE 250: Performed by: UROLOGY

## 2020-08-13 PROCEDURE — 88304 PR  SURG PATH,LEVEL III: ICD-10-PCS | Mod: 26,,, | Performed by: PATHOLOGY

## 2020-08-13 PROCEDURE — 36000706: Performed by: UROLOGY

## 2020-08-13 PROCEDURE — 88302 TISSUE EXAM BY PATHOLOGIST: CPT | Performed by: PATHOLOGY

## 2020-08-13 PROCEDURE — 88302 TISSUE EXAM BY PATHOLOGIST: CPT | Mod: 26,,, | Performed by: PATHOLOGY

## 2020-08-13 PROCEDURE — 37000008 HC ANESTHESIA 1ST 15 MINUTES: Performed by: UROLOGY

## 2020-08-13 PROCEDURE — 71000033 HC RECOVERY, INTIAL HOUR: Performed by: UROLOGY

## 2020-08-13 PROCEDURE — 88302 PR  SURG PATH,LEVEL II: ICD-10-PCS | Mod: 26,,, | Performed by: PATHOLOGY

## 2020-08-13 PROCEDURE — 63600175 PHARM REV CODE 636 W HCPCS: Performed by: UROLOGY

## 2020-08-13 PROCEDURE — 55250 PR REMOVAL OF SPERM DUCT(S): ICD-10-PCS | Mod: ,,, | Performed by: UROLOGY

## 2020-08-13 PROCEDURE — 54161 PR CIRCUMCISION - SURGICAL NO CLAMP/DEVICE, 29+ DAYS OF AGE ONLY: ICD-10-PCS | Mod: 51,,, | Performed by: UROLOGY

## 2020-08-13 PROCEDURE — 88304 TISSUE EXAM BY PATHOLOGIST: CPT | Mod: 26,,, | Performed by: PATHOLOGY

## 2020-08-13 PROCEDURE — 88304 TISSUE EXAM BY PATHOLOGIST: CPT | Performed by: PATHOLOGY

## 2020-08-13 PROCEDURE — 37000009 HC ANESTHESIA EA ADD 15 MINS: Performed by: UROLOGY

## 2020-08-13 PROCEDURE — 36000707: Performed by: UROLOGY

## 2020-08-13 RX ORDER — PROPOFOL 10 MG/ML
VIAL (ML) INTRAVENOUS
Status: DISCONTINUED | OUTPATIENT
Start: 2020-08-13 | End: 2020-08-13

## 2020-08-13 RX ORDER — MIDAZOLAM HYDROCHLORIDE 1 MG/ML
INJECTION, SOLUTION INTRAMUSCULAR; INTRAVENOUS
Status: DISCONTINUED | OUTPATIENT
Start: 2020-08-13 | End: 2020-08-13

## 2020-08-13 RX ORDER — EPHEDRINE SULFATE 50 MG/ML
INJECTION, SOLUTION INTRAVENOUS
Status: DISCONTINUED | OUTPATIENT
Start: 2020-08-13 | End: 2020-08-13

## 2020-08-13 RX ORDER — CEFAZOLIN SODIUM 2 G/50ML
2 SOLUTION INTRAVENOUS
Status: COMPLETED | OUTPATIENT
Start: 2020-08-13 | End: 2020-08-13

## 2020-08-13 RX ORDER — SULFAMETHOXAZOLE AND TRIMETHOPRIM 800; 160 MG/1; MG/1
1 TABLET ORAL 2 TIMES DAILY
Qty: 10 TABLET | Refills: 0 | Status: SHIPPED | OUTPATIENT
Start: 2020-08-13 | End: 2021-01-07

## 2020-08-13 RX ORDER — ONDANSETRON 2 MG/ML
INJECTION INTRAMUSCULAR; INTRAVENOUS
Status: DISCONTINUED | OUTPATIENT
Start: 2020-08-13 | End: 2020-08-13

## 2020-08-13 RX ORDER — FENTANYL CITRATE 50 UG/ML
25 INJECTION, SOLUTION INTRAMUSCULAR; INTRAVENOUS EVERY 5 MIN PRN
Status: DISCONTINUED | OUTPATIENT
Start: 2020-08-13 | End: 2020-08-13 | Stop reason: HOSPADM

## 2020-08-13 RX ORDER — BUPIVACAINE HYDROCHLORIDE 2.5 MG/ML
INJECTION, SOLUTION EPIDURAL; INFILTRATION; INTRACAUDAL
Status: DISCONTINUED | OUTPATIENT
Start: 2020-08-13 | End: 2020-08-13 | Stop reason: HOSPADM

## 2020-08-13 RX ORDER — LIDOCAINE HYDROCHLORIDE 20 MG/ML
INJECTION INTRAVENOUS
Status: DISCONTINUED | OUTPATIENT
Start: 2020-08-13 | End: 2020-08-13

## 2020-08-13 RX ORDER — OXYCODONE AND ACETAMINOPHEN 5; 325 MG/1; MG/1
1 TABLET ORAL EVERY 4 HOURS PRN
Qty: 45 TABLET | Refills: 0 | Status: SHIPPED | OUTPATIENT
Start: 2020-08-13 | End: 2020-09-25

## 2020-08-13 RX ORDER — FENTANYL CITRATE 50 UG/ML
INJECTION, SOLUTION INTRAMUSCULAR; INTRAVENOUS
Status: DISCONTINUED | OUTPATIENT
Start: 2020-08-13 | End: 2020-08-13

## 2020-08-13 RX ORDER — ONDANSETRON 2 MG/ML
4 INJECTION INTRAMUSCULAR; INTRAVENOUS DAILY PRN
Status: DISCONTINUED | OUTPATIENT
Start: 2020-08-13 | End: 2020-08-13 | Stop reason: HOSPADM

## 2020-08-13 RX ORDER — OXYCODONE AND ACETAMINOPHEN 5; 325 MG/1; MG/1
1 TABLET ORAL
Status: DISCONTINUED | OUTPATIENT
Start: 2020-08-13 | End: 2020-08-13 | Stop reason: HOSPADM

## 2020-08-13 RX ORDER — SODIUM CHLORIDE, SODIUM LACTATE, POTASSIUM CHLORIDE, CALCIUM CHLORIDE 600; 310; 30; 20 MG/100ML; MG/100ML; MG/100ML; MG/100ML
INJECTION, SOLUTION INTRAVENOUS CONTINUOUS PRN
Status: DISCONTINUED | OUTPATIENT
Start: 2020-08-13 | End: 2020-08-13

## 2020-08-13 RX ADMIN — OXYCODONE HYDROCHLORIDE AND ACETAMINOPHEN 1 TABLET: 5; 325 TABLET ORAL at 10:08

## 2020-08-13 RX ADMIN — MIDAZOLAM 2 MG: 1 INJECTION INTRAMUSCULAR; INTRAVENOUS at 07:08

## 2020-08-13 RX ADMIN — PROPOFOL 100 MG: 10 INJECTION, EMULSION INTRAVENOUS at 08:08

## 2020-08-13 RX ADMIN — ONDANSETRON 4 MG: 2 INJECTION, SOLUTION INTRAMUSCULAR; INTRAVENOUS at 08:08

## 2020-08-13 RX ADMIN — EPHEDRINE SULFATE 25 MG: 50 INJECTION INTRAVENOUS at 08:08

## 2020-08-13 RX ADMIN — CEFAZOLIN SODIUM 2 G: 2 SOLUTION INTRAVENOUS at 07:08

## 2020-08-13 RX ADMIN — SODIUM CHLORIDE, SODIUM LACTATE, POTASSIUM CHLORIDE, AND CALCIUM CHLORIDE: 600; 310; 30; 20 INJECTION, SOLUTION INTRAVENOUS at 07:08

## 2020-08-13 RX ADMIN — FENTANYL CITRATE 100 MCG: 50 INJECTION, SOLUTION INTRAMUSCULAR; INTRAVENOUS at 07:08

## 2020-08-13 RX ADMIN — Medication 100 MG: at 08:08

## 2020-08-13 NOTE — ANESTHESIA RELEASE NOTE
"Anesthesia Release from PACU Note    Patient: Sukumar Bob    Procedure(s) Performed: Procedure(s) (LRB):  CIRCUMCISION (N/A)  VASECTOMY (Bilateral)    Anesthesia type: general    Post pain: Adequate analgesia    Post assessment: no apparent anesthetic complications    Last Vitals:   Visit Vitals  /68 (BP Location: Right arm, Patient Position: Sitting)   Pulse (!) 56   Temp 36.5 °C (97.7 °F) (Temporal)   Resp 16   Ht 5' 8" (1.727 m)   Wt 91.3 kg (201 lb 4.5 oz)   SpO2 99%   BMI 30.60 kg/m²       Post vital signs: stable    Level of consciousness: awake    Nausea/Vomiting: no nausea/no vomiting    Complications: none    Airway Patency: patent    Respiratory: unassisted    Cardiovascular: stable and blood pressure at baseline    Hydration: euvolemic  "

## 2020-08-13 NOTE — INTERVAL H&P NOTE
The patient has been examined and the H&P has been reviewed:    I concur with the findings and no changes have occurred since H&P was written.    Anesthesia/Surgery risks, benefits and alternative options discussed and understood by patient/family.          Active Hospital Problems    Diagnosis  POA    Balanitis [N48.1]  Yes      Resolved Hospital Problems   No resolved problems to display.

## 2020-08-13 NOTE — OP NOTE
Date of Procedure: 08/13/2020    PREOPERATIVE DIAGNOSIS:  Balanitis, desired infertility    POSTOPERATIVE DIAGNOSIS:  Balanitis, desired infertility    PROCEDURES:  Circumcision, vasectomy.    SURGEON: Abraham Reaves M.D.    Assistants: None    Specimen: Foreskin, right and left vas deferens    Blood loss: Minimal    ANESTHESIA:  General endotracheal.    FINDINGS:  The patient had balanitis and requested circumcision.  This was done without difficulty.  Vasectomy also performed without incident.      PROCEDURE IN DETAIL:  Pt presented, and after proper consents were obtained, he was brought to the operating room and placed under general anesthesia.  He was appropriately positioned in the supine position and the skin was prepped and draped in normal sterile fashion.  The left vas was isolated and 1% lidocaine was instilled in the skin over the vas. The skin was opened sharply, and more local was applied into the vas. Ring forceps were used to isolate the vas and bring it out of the wound. The sheath overlying the vas was dissected away, and a length of vas exposed. Approximately a 1 cm segment of vas was excised, and each end was bovie'd intraluminally and then clipped with sterile metal clips.  Hemostasis was meticulously maintained and we proceeded to the right side.  Local was then applied into the right vas. Ring forceps were used to isolate the vas and bring it out of the wound. The sheath overlying the vas was dissected away, and a length of vas exposed.  Approximately a 1 cm segment of vas was excised, and each end was bovie'd intraluminally and then clipped with sterile metal clips.  Hemostasis was meticulously maintained and we proceeded with closure.  After hemostasis was again assured at the level of the scrotal skin, I closed using a 4-0 Monocryl.  The left and right vas segments were sent to pathology for examination.  We then approached the penis to proceed with his circumcision.  The distal  circumcision incision was marked circumferentially after taking down the frenulum approximately 4 mm. The proximal circumcision was then marked circumferentially and incised.  The incisions were then connected and the intervening ring of skin was taken and divided from the penis using the Bovie.  There was excellent skin coverage of the foreskin defect.  The wound was then closed circumferentially using 3-0 chromic in interrupted fashion.  The wound was then cleaned and dressed with antibiotic ointment, Vaseline gauze, and sterile gauze loosely wrapped.  He was then taken to the PACU in stable condition.  He will follow up with me in 6 weeks for postoperative wound evaluation.  Also a nurse visit in 2 months for a semen analysis to assess for the persistent presence of sperm.    COMPLICATION: None

## 2020-08-13 NOTE — PLAN OF CARE
Plan of care and discharge instructions discussed with patient and wife who verbalized understanding

## 2020-08-13 NOTE — ANESTHESIA POSTPROCEDURE EVALUATION
Anesthesia Post Evaluation    Patient: Sukumar Bob    Procedure(s) Performed: Procedure(s) (LRB):  CIRCUMCISION (N/A)  VASECTOMY (Bilateral)    Final Anesthesia Type: general    Patient location during evaluation: PACU  Patient participation: Yes- Able to Participate  Level of consciousness: awake and alert  Post-procedure vital signs: reviewed and stable  Pain management: adequate  Airway patency: patent  JANI mitigation strategies: Extubation while patient is awake  PONV status at discharge: No PONV  Anesthetic complications: no      Cardiovascular status: hemodynamically stable  Respiratory status: spontaneous ventilation  Hydration status: euvolemic  Follow-up not needed.          Vitals Value Taken Time   /76 08/13/20 0935   Temp 36.2 °C (97.1 °F) 08/13/20 0927   Pulse 69 08/13/20 0936   Resp 7 08/13/20 0936   SpO2 100 % 08/13/20 0936   Vitals shown include unvalidated device data.      No case tracking events are documented in the log.      Pain/Delgado Score: Delgado Score: 8 (8/13/2020  9:30 AM)

## 2020-08-13 NOTE — ANESTHESIA PREPROCEDURE EVALUATION
08/13/2020  Sukumar Bob is a 40 y.o., male.    Anesthesia Evaluation    I have reviewed the Patient Summary Reports.    I have reviewed the Nursing Notes. I have reviewed the NPO Status.   I have reviewed the Medications.     Review of Systems  Anesthesia Hx:  No problems with previous Anesthesia Denies Hx of Anesthetic complications  Denies Family Hx of Anesthesia complications.   Denies Personal Hx of Anesthesia complications.   Social:  Non-Smoker, No Alcohol Use    Cardiovascular:  Cardiovascular Normal  ECG has been reviewed.    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      Current Outpatient Medications on File Prior to Encounter   Medication Sig Dispense Refill    ascorbic acid (VITAMIN C ORAL) Take by mouth once daily.      valACYclovir (VALTREX) 1000 MG tablet Take 1 tablet (1,000 mg total) by mouth 2 (two) times daily as needed. 30 tablet 2    clotrimazole-betamethasone 1-0.05% (LOTRISONE) cream Apply topically 2 (two) times daily. 45 g 0    cyclobenzaprine (FLEXERIL) 10 MG tablet       diclofenac sodium (VOLTAREN) 1 % Gel Apply 2 g topically 4 (four) times daily as needed. 5 Tube 0    methocarbamol (ROBAXIN) 500 MG Tab Take 1 tablet (500 mg total) by mouth 3 (three) times daily as needed (muscle  spasms). May cause drowsiness. (Patient not taking: Reported on 7/29/2020) 90 tablet 1    methylPREDNISolone (MEDROL DOSEPACK) 4 mg tablet use as directed (Patient not taking: Reported on 7/29/2020) 1 Package 0    predniSONE (DELTASONE) 20 MG tablet            Physical Exam  General:  Well nourished, Obesity    Airway/Jaw/Neck:  Airway Findings: Mouth Opening: Normal Tongue: Normal  General Airway Assessment: Adult  Mallampati: II  TM Distance: 4 - 6 cm  Jaw/Neck Findings:  Neck ROM: Normal ROM      Dental:  Dental Findings: In tact   Chest/Lungs:  Chest/Lungs Findings: Clear to auscultation, Normal Respiratory Rate     Heart/Vascular:  Heart Findings: Rate: Normal  Rhythm: Regular Rhythm  Sounds: Normal        Mental Status:  Mental Status Findings:  Cooperative, Alert and Oriented       Lab Results   Component Value Date    WBC 4.98 07/29/2020    HGB 13.4 (L) 07/29/2020    HCT 42.4 07/29/2020    MCV 96 07/29/2020     07/29/2020         Chemistry        Component Value Date/Time     07/29/2020 1004    K 4.4 07/29/2020 1004     07/29/2020 1004    CO2 27 07/29/2020 1004    BUN 20 07/29/2020 1004    CREATININE 1.4 07/29/2020 1004    GLU 85 07/29/2020 1004        Component Value Date/Time    CALCIUM 9.0 07/29/2020 1004    ALKPHOS 63 02/10/2020 0810    AST 22 02/10/2020 0810    ALT 18 02/10/2020 0810    BILITOT 0.9 02/10/2020 0810    ESTGFRAFRICA >60.0 07/29/2020 1004    EGFRNONAA >60.0 07/29/2020 1004            Anesthesia Plan  Type of Anesthesia, risks & benefits discussed:  Anesthesia Type:  general  Patient's Preference:   Intra-op Monitoring Plan: standard ASA monitors  Intra-op Monitoring Plan Comments:   Post Op Pain Control Plan: per primary service following discharge from PACU  Post Op Pain Control Plan Comments:   Induction:   IV  Beta Blocker:  Patient is not currently on a Beta-Blocker (No further documentation required).       Informed Consent: Patient understands risks and agrees  with Anesthesia plan.  Questions answered. Anesthesia consent signed with patient.  ASA Score: 2     Day of Surgery Review of History & Physical: I have interviewed and examined the patient. I have reviewed the patient's H&P dated:  There are no significant changes.  H&P update referred to the surgeon.         Ready For Surgery From Anesthesia Perspective.

## 2020-08-13 NOTE — TRANSFER OF CARE
"Anesthesia Transfer of Care Note    Patient: Sukumar Bob    Procedure(s) Performed: Procedure(s) (LRB):  CIRCUMCISION (N/A)  VASECTOMY (Bilateral)    Patient location: PACU    Anesthesia Type: general    Transport from OR: Transported from OR on room air with adequate spontaneous ventilation    Post pain: adequate analgesia    Post assessment: no apparent anesthetic complications    Post vital signs: stable    Level of consciousness: awake    Nausea/Vomiting: no nausea/vomiting    Complications: none    Transfer of care protocol was followed      Last vitals:   Visit Vitals  /68 (BP Location: Right arm, Patient Position: Sitting)   Pulse (!) 56   Temp 36.5 °C (97.7 °F) (Temporal)   Resp 16   Ht 5' 8" (1.727 m)   Wt 91.3 kg (201 lb 4.5 oz)   SpO2 99%   BMI 30.60 kg/m²     "

## 2020-08-17 LAB
FINAL PATHOLOGIC DIAGNOSIS: NORMAL
GROSS: NORMAL

## 2020-08-19 VITALS
SYSTOLIC BLOOD PRESSURE: 128 MMHG | RESPIRATION RATE: 15 BRPM | OXYGEN SATURATION: 98 % | DIASTOLIC BLOOD PRESSURE: 84 MMHG | WEIGHT: 201.25 LBS | BODY MASS INDEX: 30.5 KG/M2 | TEMPERATURE: 98 F | HEART RATE: 76 BPM | HEIGHT: 68 IN

## 2020-08-20 ENCOUNTER — TELEPHONE (OUTPATIENT)
Dept: UROLOGY | Facility: CLINIC | Age: 40
End: 2020-08-20

## 2020-08-20 NOTE — TELEPHONE ENCOUNTER
Explained that stiches should dissolve a few weeks after surgery. He verbalized understanding. Also provided work excuse per Dr. Reaves.

## 2020-08-20 NOTE — LETTER
August 13, 2020      [facility]  [address] [facility] [address]  Phone: [phone]  Fax: [phone]       Patient: Sukumar Bob   YOB: 1980  Date of Visit: 08/13/2020   To Whom It May Concern:    Amilcar Bob  was at [facility] on 08/13/2020. He may return to work on 09/08/2020 with no restrictions. If you have any questions or concerns, or if I can be of further assistance, please do not hesitate to contact me.    Sincerely,    Marianne Martinez LPN/Abraham Reaves MD

## 2020-08-20 NOTE — TELEPHONE ENCOUNTER
----- Message from Vivienne Murcia sent at 8/20/2020 11:26 AM CDT -----  Contact: pt  The pt request a return call, no additional info given and can be reached at [phone]///[id number]

## 2020-08-28 ENCOUNTER — LAB VISIT (OUTPATIENT)
Dept: LAB | Facility: HOSPITAL | Age: 40
End: 2020-08-28
Attending: INTERNAL MEDICINE
Payer: COMMERCIAL

## 2020-08-28 ENCOUNTER — TELEPHONE (OUTPATIENT)
Dept: FAMILY MEDICINE | Facility: CLINIC | Age: 40
End: 2020-08-28

## 2020-08-28 ENCOUNTER — CLINICAL SUPPORT (OUTPATIENT)
Dept: FAMILY MEDICINE | Facility: CLINIC | Age: 40
End: 2020-08-28
Payer: COMMERCIAL

## 2020-08-28 DIAGNOSIS — R79.89 ELEVATED SERUM CREATININE: ICD-10-CM

## 2020-08-28 LAB
BILIRUB UR QL STRIP: NEGATIVE
CLARITY UR REFRACT.AUTO: CLEAR
COLOR UR AUTO: YELLOW
CREAT UR-MCNC: 347 MG/DL (ref 23–375)
GLUCOSE UR QL STRIP: NEGATIVE
HGB UR QL STRIP: NEGATIVE
KETONES UR QL STRIP: NEGATIVE
LEUKOCYTE ESTERASE UR QL STRIP: NEGATIVE
NITRITE UR QL STRIP: NEGATIVE
PH UR STRIP: 5 [PH] (ref 5–8)
PROT UR QL STRIP: NEGATIVE
PROT UR-MCNC: 11 MG/DL (ref 0–15)
PROT/CREAT UR: 0.03 MG/G{CREAT} (ref 0–0.2)
SP GR UR STRIP: 1.02 (ref 1–1.03)
URN SPEC COLLECT METH UR: NORMAL

## 2020-08-28 PROCEDURE — 81003 URINALYSIS AUTO W/O SCOPE: CPT

## 2020-08-28 PROCEDURE — 99999 PR PBB SHADOW E&M-EST. PATIENT-LVL II: CPT | Mod: PBBFAC,,,

## 2020-08-28 PROCEDURE — 96372 THER/PROPH/DIAG INJ SC/IM: CPT | Mod: S$GLB,,, | Performed by: INTERNAL MEDICINE

## 2020-08-28 PROCEDURE — 84156 ASSAY OF PROTEIN URINE: CPT

## 2020-08-28 PROCEDURE — 99999 PR PBB SHADOW E&M-EST. PATIENT-LVL II: ICD-10-PCS | Mod: PBBFAC,,,

## 2020-08-28 PROCEDURE — 96372 PR INJECTION,THERAP/PROPH/DIAG2ST, IM OR SUBCUT: ICD-10-PCS | Mod: S$GLB,,, | Performed by: INTERNAL MEDICINE

## 2020-08-28 RX ORDER — TESTOSTERONE CYPIONATE 200 MG/ML
200 INJECTION, SOLUTION INTRAMUSCULAR
Status: DISCONTINUED | OUTPATIENT
Start: 2020-08-28 | End: 2020-10-26 | Stop reason: SDUPTHER

## 2020-08-28 RX ADMIN — TESTOSTERONE CYPIONATE 200 MG: 200 INJECTION, SOLUTION INTRAMUSCULAR at 09:08

## 2020-08-28 NOTE — PROGRESS NOTES
Pt blayne inj well to right ventro glute  Advised to wait in lobby 15 min and notify nurse of reaction  Pt verbalized understanding

## 2020-08-31 ENCOUNTER — OFFICE VISIT (OUTPATIENT)
Dept: NEPHROLOGY | Facility: CLINIC | Age: 40
End: 2020-08-31
Payer: COMMERCIAL

## 2020-08-31 VITALS
HEIGHT: 68 IN | SYSTOLIC BLOOD PRESSURE: 100 MMHG | BODY MASS INDEX: 30.87 KG/M2 | DIASTOLIC BLOOD PRESSURE: 60 MMHG | HEART RATE: 80 BPM | WEIGHT: 203.69 LBS

## 2020-08-31 DIAGNOSIS — R79.89 ELEVATED SERUM CREATININE: Primary | ICD-10-CM

## 2020-08-31 DIAGNOSIS — R20.0 BILATERAL HAND NUMBNESS: ICD-10-CM

## 2020-08-31 PROCEDURE — 3008F PR BODY MASS INDEX (BMI) DOCUMENTED: ICD-10-PCS | Mod: CPTII,S$GLB,, | Performed by: INTERNAL MEDICINE

## 2020-08-31 PROCEDURE — 3008F BODY MASS INDEX DOCD: CPT | Mod: CPTII,S$GLB,, | Performed by: INTERNAL MEDICINE

## 2020-08-31 PROCEDURE — 99999 PR PBB SHADOW E&M-EST. PATIENT-LVL IV: CPT | Mod: PBBFAC,,, | Performed by: INTERNAL MEDICINE

## 2020-08-31 PROCEDURE — 99213 PR OFFICE/OUTPT VISIT, EST, LEVL III, 20-29 MIN: ICD-10-PCS | Mod: S$GLB,,, | Performed by: INTERNAL MEDICINE

## 2020-08-31 PROCEDURE — 99999 PR PBB SHADOW E&M-EST. PATIENT-LVL IV: ICD-10-PCS | Mod: PBBFAC,,, | Performed by: INTERNAL MEDICINE

## 2020-08-31 PROCEDURE — 99213 OFFICE O/P EST LOW 20 MIN: CPT | Mod: S$GLB,,, | Performed by: INTERNAL MEDICINE

## 2020-08-31 NOTE — PROGRESS NOTES
"Subjective:       Patient ID: Sukumar Bob is a 40 y.o. Black or  male who presents for follow-up evaluation of elevated creatinine    HPI     Patient is a 40-year-old male with history of low testosterone levels on testosterone injections every 4 weeks.  Patient has had off and on nonsteroidals.  Patient's baseline creatinine has been fluctuating between 1.3 and 1.7 in the past 3 years.  Never had any proteinuria.    February 2020 evaluated for elevated creatinine.  Patient has a high muscle mass. Workup ordered    Review of Systems   Constitutional: Negative.  Negative for activity change, appetite change, chills, diaphoresis, fatigue and fever.   HENT: Negative.  Negative for congestion and trouble swallowing.    Eyes: Negative.    Respiratory: Negative.  Negative for cough, chest tightness, shortness of breath and wheezing.    Cardiovascular: Negative.  Negative for chest pain, palpitations and leg swelling.   Gastrointestinal: Negative.  Negative for abdominal distention, abdominal pain, nausea and vomiting.   Genitourinary: Negative.  Negative for decreased urine volume, difficulty urinating, dysuria, enuresis, flank pain, frequency, hematuria, penile swelling, scrotal swelling and urgency.   Musculoskeletal: Negative.  Negative for arthralgias, back pain, joint swelling and neck pain.   Skin: Negative for rash.   Neurological: Negative.  Negative for tremors, seizures and headaches.   Psychiatric/Behavioral: Negative.  Negative for confusion and sleep disturbance. The patient is not nervous/anxious.    All other systems reviewed and are negative.      Objective:   /60   Pulse 80   Ht 5' 8" (1.727 m)   Wt 92.4 kg (203 lb 11.3 oz)   BMI 30.97 kg/m²      Physical Exam   Constitutional: He is oriented to person, place, and time. He appears well-developed and well-nourished. No distress.   HENT:   Head: Normocephalic.   Eyes: Pupils are equal, round, and reactive to light. EOM " are normal.   Neck: Normal range of motion. Neck supple. No JVD present. No thyromegaly present.   Cardiovascular: Normal rate, regular rhythm, S1 normal, S2 normal, normal heart sounds and intact distal pulses. PMI is not displaced. Exam reveals no gallop and no friction rub.   No murmur heard.  Pulmonary/Chest: Effort normal and breath sounds normal. No respiratory distress. He has no wheezes. He has no rales. He exhibits no tenderness.   Abdominal: He exhibits no distension and no mass. There is no hepatosplenomegaly. There is no tenderness. There is no rebound and no CVA tenderness. No hernia.   Musculoskeletal: Normal range of motion. He exhibits no edema or tenderness.   Lymphadenopathy:     He has no cervical adenopathy.   Neurological: He is alert and oriented to person, place, and time. He has normal reflexes. He is not disoriented. He displays normal reflexes. No cranial nerve deficit. He exhibits normal muscle tone. Coordination normal.   Skin: Skin is warm and dry. No rash noted. No erythema.   Psychiatric: He has a normal mood and affect. His behavior is normal. Judgment and thought content normal.                       Lab Results   Component Value Date    CREATININE 1.2 08/28/2020    BUN 14 08/28/2020     08/28/2020    K 3.7 08/28/2020     08/28/2020    CO2 27 08/28/2020     Lab Results   Component Value Date    WBC 4.98 07/29/2020    HGB 13.4 (L) 07/29/2020    HCT 42.4 07/29/2020    MCV 96 07/29/2020     07/29/2020     Lab Results   Component Value Date    CALCIUM 8.9 08/28/2020    PHOS 3.7 08/28/2020       Assessment:    )    1. Elevated serum creatinine    2. Bilateral hand numbness        Plan:         workup for chronic kidney disease/any kidney disease is negative.  Elevated creatinine is due to high muscle mass.  No proteinuria.  Urine examination is benign no active sediment.  Renal ultrasound in February is normal.  GFR is 115% with a creatinine of 1.2.  All electrolytes  are normal.  No or new organ damage.  Blood pressure is well controlled.  Patient reassured.  Follow-up p.r.n.    Avoid muscle relaxants like baclofen     Avoid nonsteroidals; no mobic no Advil or Motrin; okay to take Tylenol for pain;      If you have to have a CT scan or a test with IV dye then Drink plenty of fluids to make urine look like water

## 2020-08-31 NOTE — PATIENT INSTRUCTIONS
workup for chronic kidney disease/any kidney disease is negative.  Elevated creatinine is due to high muscle mass.  No proteinuria.  Urine examination is benign no active sediment.  Renal ultrasound in February is normal.  GFR is 115% with a creatinine of 1.2.  All electrolytes are normal.  No or new organ damage.  Blood pressure is well controlled.  Patient reassured.  Follow-up p.r.n.    Avoid muscle relaxants like baclofen     Avoid nonsteroidals; no mobic no Advil or Motrin; okay to take Tylenol for pain;      If you have to have a CT scan or a test with IV dye then Drink plenty of fluids to make urine look like water

## 2020-09-25 ENCOUNTER — OFFICE VISIT (OUTPATIENT)
Dept: UROLOGY | Facility: CLINIC | Age: 40
End: 2020-09-25
Payer: COMMERCIAL

## 2020-09-25 ENCOUNTER — HOSPITAL ENCOUNTER (OUTPATIENT)
Dept: RADIOLOGY | Facility: HOSPITAL | Age: 40
Discharge: HOME | End: 2020-09-25
Attending: UROLOGY
Payer: COMMERCIAL

## 2020-09-25 VITALS
BODY MASS INDEX: 30.71 KG/M2 | TEMPERATURE: 98 F | WEIGHT: 201.94 LBS | SYSTOLIC BLOOD PRESSURE: 112 MMHG | DIASTOLIC BLOOD PRESSURE: 80 MMHG

## 2020-09-25 DIAGNOSIS — N45.3 EPIDIDYMOORCHITIS: ICD-10-CM

## 2020-09-25 DIAGNOSIS — N46.9 STERILITY MALE: ICD-10-CM

## 2020-09-25 DIAGNOSIS — N48.1 BALANITIS: Primary | ICD-10-CM

## 2020-09-25 PROCEDURE — 99024 PR POST-OP FOLLOW-UP VISIT: ICD-10-PCS | Mod: S$GLB,,, | Performed by: UROLOGY

## 2020-09-25 PROCEDURE — 99999 PR PBB SHADOW E&M-EST. PATIENT-LVL III: CPT | Mod: PBBFAC,,, | Performed by: UROLOGY

## 2020-09-25 PROCEDURE — 99024 POSTOP FOLLOW-UP VISIT: CPT | Mod: S$GLB,,, | Performed by: UROLOGY

## 2020-09-25 PROCEDURE — 99999 PR PBB SHADOW E&M-EST. PATIENT-LVL III: ICD-10-PCS | Mod: PBBFAC,,, | Performed by: UROLOGY

## 2020-09-25 RX ORDER — CEFDINIR 300 MG/1
300 CAPSULE ORAL 2 TIMES DAILY
Qty: 28 CAPSULE | Refills: 0 | Status: SHIPPED | OUTPATIENT
Start: 2020-09-25 | End: 2020-10-09

## 2020-09-25 RX ORDER — OXYCODONE AND ACETAMINOPHEN 5; 325 MG/1; MG/1
1 TABLET ORAL EVERY 4 HOURS PRN
Qty: 10 TABLET | Refills: 0 | Status: SHIPPED | OUTPATIENT
Start: 2020-09-25 | End: 2021-01-07

## 2020-09-25 RX ORDER — CEFDINIR 300 MG/1
300 CAPSULE ORAL 2 TIMES DAILY
Qty: 28 CAPSULE | Refills: 0 | Status: SHIPPED | OUTPATIENT
Start: 2020-09-25 | End: 2020-09-25

## 2020-09-25 RX ORDER — OXYCODONE AND ACETAMINOPHEN 5; 325 MG/1; MG/1
1 TABLET ORAL EVERY 4 HOURS PRN
Qty: 10 TABLET | Refills: 0 | Status: SHIPPED | OUTPATIENT
Start: 2020-09-25 | End: 2020-09-25

## 2020-09-25 NOTE — PROGRESS NOTES
Chief Complaint:   Encounter Diagnoses   Name Primary?   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  Unfortunately yesterday he began to have right testicular pain and discomfort, he comes in a little early to be assessed.  No fevers thus far.  40-year-old gentleman who reports with recurrent balanitis, having to use Lotrisone cream on multiple ocurrences now.  Patient states is also having some pain and discomfort peeling back the skin, possibly an early phimosis.  He is having no voiding complaints, no gross hematuria.  Patient has had no other urological history per his report, except for recent rise in creatinine which appears to be stabilized.  No family history of urological cancers or stones.  Patient also see today's discuss possible vasectomy.  He has 3 children, he is certain that he desires no more. He is aware of other forms of contraception. He is aware that vasectomy is to be considered permanent/irreversible.  This is a consultation from Dr. Jet Jefferson.    PATIENT HISTORY:    Past Medical History:   Diagnosis Date    HSV (herpes simplex virus) infection     Mild chronic anemia        Past Surgical History:   Procedure Laterality Date    CIRCUMCISION N/A 8/13/2020    Procedure: CIRCUMCISION;  Surgeon: Abraham Reaves MD;  Location: [facility] OR;  Service: Urology;  Laterality: N/A;    none      VASECTOMY Bilateral 8/13/2020    Procedure: VASECTOMY;  Surgeon: Abraham Reaves MD;  Location: [facility] OR;  Service: Urology;  Laterality: Bilateral;       Family History   Problem Relation Age of Onset    No Known Problems Mother     No Known Problems Father     No Known Problems Sister     No Known Problems Brother     Hypertension Maternal Grandmother     Stroke Maternal Grandmother     No Known Problems Sister     No Known Problems Brother     No Known Problems Son      No Known Problems Son     Throat cancer Cousin     Diabetes Neg Hx     Heart disease Neg Hx        Social History     Socioeconomic History    Marital status:      Spouse name: Not on file    Number of children: 2    Years of education: Not on file    Highest education level: Not on file   Occupational History    Occupation:      Comment: [employer]   Social Needs    Financial resource strain: Not on file    Food insecurity     Worry: Not on file     Inability: Not on file    Transportation needs     Medical: Not on file     Non-medical: Not on file   Tobacco Use    Smoking status: Never Smoker    Smokeless tobacco: Never Used   Substance and Sexual Activity    Alcohol use: Yes     Comment: socially- no alcohol for 72 hours before surgery    Drug use: No    Sexual activity: Yes     Partners: Female   Lifestyle    Physical activity     Days per week: Not on file     Minutes per session: Not on file    Stress: Not on file   Relationships    Social connections     Talks on phone: Not on file     Gets together: Not on file     Attends [employer] service: Not on file     Active member of club or organization: Not on file     Attends meetings of clubs or organizations: Not on file     Relationship status: Not on file   Other Topics Concern    Not on file   Social History Narrative    He wears seatbelt. He is expecting a daughter soon.       Allergies:  Patient has no known allergies.    Medications:  has a current medication list which includes the following prescription(s): ascorbic acid, bacitracin-polymyxin b, cyclobenzaprine, diclofenac sodium, methocarbamol, methylprednisolone, oxycodone-acetaminophen, prednisone, sulfamethoxazole-trimethoprim 800-160mg, and valacyclovir, and the following Facility-Administered Medications: testosterone cypionate.    Review of Systems:  General: No fever, chills, fatigability, or weight loss.  Skin: No rashes, itching, or  changes in color or texture of skin.  Chest: Denies BECKMAN, cyanosis, wheezing, cough, and sputum production.  Abdomen: Appetite fine. No weight loss. Denies diarrhea, abdominal pain, hematemesis, or blood in stool.  Musculoskeletal: No joint stiffness or swelling. Denies back pain.  : As above.  All other review of systems negative.      Physical Exam:  There were no vitals filed for this visit.  General appearance: Well-developed, well-nourished male in no apparent distress.  Mental status: Alert and oriented. Cooperative with normal affect.  Neuro: Motor and sensory exams grossly intact.  HEENT: Normocephalic. Atraumatic.  Neck: Normal ROM.  Chest: Non labored breathing.  Heart: Regular rate and rhythm.  Abdomen: Soft, non-distended, non-tender. No masses or organomegaly. No evidence of inguinal hernia.  Genitourinary:  Circumcision incision is clean, dry and intact except for 1 small spot left of ventral aspect, no evidence of infection.  No significant edema.  Left testicle is unremarkable, no intra testicular lesions or epididymal lesions.  Right testicle and epididymis are tender to palpation and full, normal scrotal skin.  Extremities: No cyanosis, clubbing, or edema.    Labs/Studies:   none    Impression/Plan:     1. Balanitis- circumcision/vasectomy 8/13/20    Patient has done well in regards to his circumcision, healing well.  Nothing further at this time.    2. Desire for sterility- status post vasectomy, it appears the patient may have epididymal orchitis.  I have refilled his pain medication, as he cannot take ibuprofen due to his kidney function.  Cefdinir b.i.d. times 14 days, scrotal ultrasound for baseline assessment today.  I will have him return in 2 weeks to re-evaluate, he is to contact me immediately for any further abnormalities.  He will also bring in his first semen sample after the next appointment.

## 2020-10-16 ENCOUNTER — TELEPHONE (OUTPATIENT)
Dept: UROLOGY | Facility: CLINIC | Age: 40
End: 2020-10-16

## 2020-10-16 DIAGNOSIS — N45.3 EPIDIDYMOORCHITIS: Primary | ICD-10-CM

## 2020-10-16 NOTE — TELEPHONE ENCOUNTER
States he missed his appointment on the 13th and wanted to reschedule.  Also he thinks his incision from his vasectomy that was done 2 months ago is getting infected and hurting. Wanting antibiotics and pain meds refilled   I explained that Dr Reaves is out of the office today but I will send him the message.  He wanted to reschedule his appointment to bring in his sample and see Dr Reaves in 2 wks.  Appointment made for October 30th

## 2020-10-16 NOTE — TELEPHONE ENCOUNTER
----- Message from Avis Sánchez sent at 10/16/2020  4:26 PM CDT -----  Contact: pt  Type:  Needs Medical Advice    Who Called: Sukumar  Symptoms (please be specific): pain   How long has patient had these symptoms:  1 week  Pharmacy name and phone #:    [id number] - [place], [place] - [address] [facility]  [address] [facility] [place] [address]  Phone: [phone] Fax: [phone]    [id number] - [place], [place] - [address] [address]  Phone: [phone] Fax: [phone]    [id number] - [place], [place] - [address]  [address]  [place] [address]  Phone: [phone] Fax: [phone]  ,  Would the patient rather a call back or a response via [facility]? call  Best Call Back Number: [phone]  Additional Information:

## 2020-10-17 RX ORDER — CEFDINIR 300 MG/1
300 CAPSULE ORAL 2 TIMES DAILY
Qty: 20 CAPSULE | Refills: 0 | Status: SHIPPED | OUTPATIENT
Start: 2020-10-17 | End: 2020-10-27

## 2020-10-19 ENCOUNTER — TELEPHONE (OUTPATIENT)
Dept: UROLOGY | Facility: CLINIC | Age: 40
End: 2020-10-19

## 2020-10-19 NOTE — TELEPHONE ENCOUNTER
Called and spoke with pt about Dr Reaves wanting to do a scrotal u/s being he is hurting after vas that was done x2 months ago.  Appointment made and pt aware of date and time

## 2020-10-26 ENCOUNTER — OFFICE VISIT (OUTPATIENT)
Dept: FAMILY MEDICINE | Facility: CLINIC | Age: 40
End: 2020-10-26
Payer: COMMERCIAL

## 2020-10-26 ENCOUNTER — HOSPITAL ENCOUNTER (OUTPATIENT)
Dept: RADIOLOGY | Facility: HOSPITAL | Age: 40
Discharge: HOME | End: 2020-10-26
Attending: UROLOGY
Payer: COMMERCIAL

## 2020-10-26 VITALS
HEART RATE: 69 BPM | BODY MASS INDEX: 31.53 KG/M2 | OXYGEN SATURATION: 98 % | SYSTOLIC BLOOD PRESSURE: 120 MMHG | RESPIRATION RATE: 16 BRPM | WEIGHT: 207.31 LBS | TEMPERATURE: 98 F | DIASTOLIC BLOOD PRESSURE: 80 MMHG

## 2020-10-26 DIAGNOSIS — R21 RASH OF GENITAL AREA: Primary | ICD-10-CM

## 2020-10-26 PROCEDURE — 99213 PR OFFICE/OUTPT VISIT, EST, LEVL III, 20-29 MIN: ICD-10-PCS | Mod: 25,S$GLB,, | Performed by: INTERNAL MEDICINE

## 2020-10-26 PROCEDURE — 96372 PR INJECTION,THERAP/PROPH/DIAG2ST, IM OR SUBCUT: ICD-10-PCS | Mod: S$GLB,,, | Performed by: INTERNAL MEDICINE

## 2020-10-26 PROCEDURE — 76870 US EXAM SCROTUM: CPT | Mod: TC

## 2020-10-26 PROCEDURE — 99999 PR PBB SHADOW E&M-EST. PATIENT-LVL IV: CPT | Mod: PBBFAC,,, | Performed by: INTERNAL MEDICINE

## 2020-10-26 PROCEDURE — 3008F BODY MASS INDEX DOCD: CPT | Mod: CPTII,S$GLB,, | Performed by: INTERNAL MEDICINE

## 2020-10-26 PROCEDURE — 3008F PR BODY MASS INDEX (BMI) DOCUMENTED: ICD-10-PCS | Mod: CPTII,S$GLB,, | Performed by: INTERNAL MEDICINE

## 2020-10-26 PROCEDURE — 99213 OFFICE O/P EST LOW 20 MIN: CPT | Mod: 25,S$GLB,, | Performed by: INTERNAL MEDICINE

## 2020-10-26 PROCEDURE — 99999 PR PBB SHADOW E&M-EST. PATIENT-LVL IV: ICD-10-PCS | Mod: PBBFAC,,, | Performed by: INTERNAL MEDICINE

## 2020-10-26 PROCEDURE — 96372 THER/PROPH/DIAG INJ SC/IM: CPT | Mod: S$GLB,,, | Performed by: INTERNAL MEDICINE

## 2020-10-26 RX ORDER — CLOTRIMAZOLE AND BETAMETHASONE DIPROPIONATE 10; .64 MG/G; MG/G
CREAM TOPICAL 2 TIMES DAILY
Qty: 45 G | Refills: 0 | Status: SHIPPED | OUTPATIENT
Start: 2020-10-26 | End: 2021-01-07

## 2020-10-26 RX ORDER — NYSTATIN 100000 [USP'U]/G
POWDER TOPICAL 2 TIMES DAILY
Qty: 60 G | Refills: 0 | Status: SHIPPED | OUTPATIENT
Start: 2020-10-26 | End: 2021-01-07

## 2020-10-26 RX ORDER — TESTOSTERONE CYPIONATE 200 MG/ML
200 INJECTION, SOLUTION INTRAMUSCULAR
Status: DISCONTINUED | OUTPATIENT
Start: 2020-10-26 | End: 2020-12-01

## 2020-10-26 RX ADMIN — TESTOSTERONE CYPIONATE 200 MG: 200 INJECTION, SOLUTION INTRAMUSCULAR at 03:10

## 2020-10-26 NOTE — PROGRESS NOTES
Subjective:       Patient ID: Sukumar Bob is a 40 y.o. male.    Chief Complaint: Rash    1 month itchy rash genital area----no penile d/c-------no ulcers------    Rash  Pertinent negatives include no fever.     Past Medical History:   Diagnosis Date    HSV (herpes simplex virus) infection     Mild chronic anemia      Past Surgical History:   Procedure Laterality Date    CIRCUMCISION N/A 8/13/2020    Procedure: CIRCUMCISION;  Surgeon: Abraham Reaves MD;  Location: [facility] OR;  Service: Urology;  Laterality: N/A;    none      VASECTOMY Bilateral 8/13/2020    Procedure: VASECTOMY;  Surgeon: Abraham Reaves MD;  Location: [facility] OR;  Service: Urology;  Laterality: Bilateral;     Family History   Problem Relation Age of Onset    No Known Problems Mother     No Known Problems Father     No Known Problems Sister     No Known Problems Brother     Hypertension Maternal Grandmother     Stroke Maternal Grandmother     No Known Problems Sister     No Known Problems Brother     No Known Problems Son     No Known Problems Son     Throat cancer Cousin     Diabetes Neg Hx     Heart disease Neg Hx      Social History     Socioeconomic History    Marital status:      Spouse name: Not on file    Number of children: 2    Years of education: Not on file    Highest education level: Not on file   Occupational History    Occupation:      Comment: [employer]   Social Needs    Financial resource strain: Not on file    Food insecurity     Worry: Not on file     Inability: Not on file    Transportation needs     Medical: Not on file     Non-medical: Not on file   Tobacco Use    Smoking status: Never Smoker    Smokeless tobacco: Never Used   Substance and Sexual Activity    Alcohol use: Yes     Comment: socially- no alcohol for 72 hours before surgery    Drug use: No    Sexual activity: Yes     Partners: Female   Lifestyle    Physical activity     Days per  week: Not on file     Minutes per session: Not on file    Stress: Not on file   Relationships    Social connections     Talks on phone: Not on file     Gets together: Not on file     Attends Islam service: Not on file     Active member of club or organization: Not on file     Attends meetings of clubs or organizations: Not on file     Relationship status: Not on file   Other Topics Concern    Not on file   Social History Narrative    He wears seatbelt. He is expecting a daughter soon.     Review of Systems   Constitutional: Negative for chills and fever.   HENT: Negative.    Respiratory: Negative.    Cardiovascular: Negative.    Gastrointestinal: Negative.    Genitourinary: Negative.    Musculoskeletal: Negative.    Skin: Positive for rash.   Neurological: Negative.        Objective:      Physical Exam  Vitals signs and nursing note reviewed.   Constitutional:       Appearance: Normal appearance.   Cardiovascular:      Rate and Rhythm: Normal rate and regular rhythm.      Pulses: Normal pulses.      Heart sounds: Normal heart sounds.   Pulmonary:      Effort: Pulmonary effort is normal.      Breath sounds: Normal breath sounds.   Abdominal:      General: Abdomen is flat.   Skin:     Comments: Candidiasis type rash-   Neurological:      Mental Status: He is alert.         CMP  Sodium   Date Value Ref Range Status   08/28/2020 141 136 - 145 mmol/L Final     Potassium   Date Value Ref Range Status   08/28/2020 3.7 3.5 - 5.1 mmol/L Final     Chloride   Date Value Ref Range Status   08/28/2020 107 95 - 110 mmol/L Final     CO2   Date Value Ref Range Status   08/28/2020 27 23 - 29 mmol/L Final     Glucose   Date Value Ref Range Status   08/28/2020 80 70 - 110 mg/dL Final     BUN, Bld   Date Value Ref Range Status   08/28/2020 14 6 - 20 mg/dL Final     Creatinine   Date Value Ref Range Status   08/28/2020 1.2 0.5 - 1.4 mg/dL Final     Calcium   Date Value Ref Range Status   08/28/2020 8.9 8.7 - 10.5 mg/dL Final      Total Protein   Date Value Ref Range Status   02/10/2020 7.4 6.0 - 8.4 g/dL Final     Albumin   Date Value Ref Range Status   08/28/2020 3.9 3.5 - 5.2 g/dL Final     Total Bilirubin   Date Value Ref Range Status   02/10/2020 0.9 0.1 - 1.0 mg/dL Final     Comment:     For infants and newborns, interpretation of results should be based  on gestational age, weight and in agreement with clinical  observations.  Premature Infant recommended reference ranges:  Up to 24 hours.............<8.0 mg/dL  Up to 48 hours............<12.0 mg/dL  3-5 days..................<15.0 mg/dL  6-29 days.................<15.0 mg/dL       Alkaline Phosphatase   Date Value Ref Range Status   02/10/2020 63 55 - 135 U/L Final     AST   Date Value Ref Range Status   02/10/2020 22 10 - 40 U/L Final     ALT   Date Value Ref Range Status   02/10/2020 18 10 - 44 U/L Final     Anion Gap   Date Value Ref Range Status   08/28/2020 7 (L) 8 - 16 mmol/L Final     eGFR if    Date Value Ref Range Status   08/28/2020 >60.0 >60 mL/min/1.73 m^2 Final     eGFR if non    Date Value Ref Range Status   08/28/2020 >60.0 >60 mL/min/1.73 m^2 Final     Comment:     Calculation used to obtain the estimated glomerular filtration  rate (eGFR) is the CKD-EPI equation.        Lab Results   Component Value Date    WBC 4.98 07/29/2020    HGB 13.4 (L) 07/29/2020    HCT 42.4 07/29/2020    MCV 96 07/29/2020     07/29/2020     Lab Results   Component Value Date    CHOL 198 02/10/2020     Lab Results   Component Value Date    HDL 57 02/10/2020     Lab Results   Component Value Date    LDLCALC 121.0 02/10/2020     Lab Results   Component Value Date    TRIG 100 02/10/2020     Lab Results   Component Value Date    CHOLHDL 28.8 02/10/2020     Lab Results   Component Value Date    TSH 0.591 01/09/2020     Lab Results   Component Value Date    HGBA1C 5.5 01/09/2020     Assessment:       1. Rash of genital area        Plan:   Rash of genital  area    Other orders  -     nystatin (MYCOSTATIN) powder; Apply topically 2 (two) times daily.  Dispense: 60 g; Refill: 0  -     clotrimazole-betamethasone 1-0.05% (LOTRISONE) cream; Apply topically 2 (two) times daily.  Dispense: 45 g; Refill: 0    Call if persists--------------

## 2020-10-30 ENCOUNTER — OFFICE VISIT (OUTPATIENT)
Dept: UROLOGY | Facility: CLINIC | Age: 40
End: 2020-10-30
Payer: COMMERCIAL

## 2020-10-30 VITALS
DIASTOLIC BLOOD PRESSURE: 76 MMHG | TEMPERATURE: 99 F | SYSTOLIC BLOOD PRESSURE: 130 MMHG | BODY MASS INDEX: 32.21 KG/M2 | WEIGHT: 211.88 LBS

## 2020-10-30 DIAGNOSIS — N48.1 BALANITIS: ICD-10-CM

## 2020-10-30 DIAGNOSIS — N45.3 EPIDIDYMOORCHITIS: Primary | ICD-10-CM

## 2020-10-30 DIAGNOSIS — N46.9 STERILITY MALE: ICD-10-CM

## 2020-10-30 PROCEDURE — 3008F PR BODY MASS INDEX (BMI) DOCUMENTED: ICD-10-PCS | Mod: CPTII,S$GLB,, | Performed by: UROLOGY

## 2020-10-30 PROCEDURE — 99999 PR PBB SHADOW E&M-EST. PATIENT-LVL III: ICD-10-PCS | Mod: PBBFAC,,, | Performed by: UROLOGY

## 2020-10-30 PROCEDURE — 3008F BODY MASS INDEX DOCD: CPT | Mod: CPTII,S$GLB,, | Performed by: UROLOGY

## 2020-10-30 PROCEDURE — 99024 PR POST-OP FOLLOW-UP VISIT: ICD-10-PCS | Mod: S$GLB,,, | Performed by: UROLOGY

## 2020-10-30 PROCEDURE — 99024 POSTOP FOLLOW-UP VISIT: CPT | Mod: S$GLB,,, | Performed by: UROLOGY

## 2020-10-30 PROCEDURE — 99999 PR PBB SHADOW E&M-EST. PATIENT-LVL III: CPT | Mod: PBBFAC,,, | Performed by: UROLOGY

## 2020-10-30 RX ORDER — CLOTRIMAZOLE AND BETAMETHASONE DIPROPIONATE 10; .64 MG/G; MG/G
CREAM TOPICAL 2 TIMES DAILY
Qty: 1 TUBE | Refills: 1 | Status: SHIPPED | OUTPATIENT
Start: 2020-10-30 | End: 2021-01-07

## 2020-10-30 NOTE — PROGRESS NOTES
Chief Complaint:   Encounter Diagnoses   Name Primary?    Epididymoorchitis Yes    Balanitis     Sterility male        HPI:    10/30/20- patient's epididymitis has resolved.  He still has a little bit of edema at the circumcision site, but otherwise no discomfort.  09/25/2020- patient has done well after his vasectomy and circumcision, he is a little bit over a month out.  Unfortunately yesterday he began to have right testicular pain and discomfort, he comes in a little early to be assessed.  No fevers thus far.  40-year-old gentleman who reports with recurrent balanitis, having to use Lotrisone cream on multiple ocurrences now.  Patient states is also having some pain and discomfort peeling back the skin, possibly an early phimosis.  He is having no voiding complaints, no gross hematuria.  Patient has had no other urological history per his report, except for recent rise in creatinine which appears to be stabilized.  No family history of urological cancers or stones.  Patient also see today's discuss possible vasectomy.  He has 3 children, he is certain that he desires no more. He is aware of other forms of contraception. He is aware that vasectomy is to be considered permanent/irreversible.  This is a consultation from Dr. Jet Jefferson.    PATIENT HISTORY:    Past Medical History:   Diagnosis Date    HSV (herpes simplex virus) infection     Mild chronic anemia        Past Surgical History:   Procedure Laterality Date    CIRCUMCISION N/A 8/13/2020    Procedure: CIRCUMCISION;  Surgeon: Abraham Reaves MD;  Location: [facility] OR;  Service: Urology;  Laterality: N/A;    none      VASECTOMY Bilateral 8/13/2020    Procedure: VASECTOMY;  Surgeon: Abraham Reaves MD;  Location: [facility] OR;  Service: Urology;  Laterality: Bilateral;       Family History   Problem Relation Age of Onset    No Known Problems Mother     No Known Problems Father     No Known Problems Sister     No Known Problems Brother      Hypertension Maternal Grandmother     Stroke Maternal Grandmother     No Known Problems Sister     No Known Problems Brother     No Known Problems Son     No Known Problems Son     Throat cancer Cousin     Diabetes Neg Hx     Heart disease Neg Hx        Social History     Socioeconomic History    Marital status:      Spouse name: Not on file    Number of children: 2    Years of education: Not on file    Highest education level: Not on file   Occupational History    Occupation:      Comment: [employer]   Social Needs    Financial resource strain: Not on file    Food insecurity     Worry: Not on file     Inability: Not on file    Transportation needs     Medical: Not on file     Non-medical: Not on file   Tobacco Use    Smoking status: Never Smoker    Smokeless tobacco: Never Used   Substance and Sexual Activity    Alcohol use: Yes     Comment: socially- no alcohol for 72 hours before surgery    Drug use: No    Sexual activity: Yes     Partners: Female   Lifestyle    Physical activity     Days per week: Not on file     Minutes per session: Not on file    Stress: Not on file   Relationships    Social connections     Talks on phone: Not on file     Gets together: Not on file     Attends Hindu service: Not on file     Active member of club or organization: Not on file     Attends meetings of clubs or organizations: Not on file     Relationship status: Not on file   Other Topics Concern    Not on file   Social History Narrative    He wears seatbelt. He is expecting a daughter soon.       Allergies:  Patient has no known allergies.    Medications:  has a current medication list which includes the following prescription(s): ascorbic acid, clotrimazole-betamethasone 1-0.05%, bacitracin-polymyxin b, cyclobenzaprine, diclofenac sodium, methocarbamol, methylprednisolone, nystatin, oxycodone-acetaminophen, prednisone, sulfamethoxazole-trimethoprim 800-160mg, and  valacyclovir, and the following Facility-Administered Medications: testosterone cypionate.    Review of Systems:  General: No fever, chills, fatigability, or weight loss.  Skin: No rashes, itching, or changes in color or texture of skin.  Chest: Denies BECKMAN, cyanosis, wheezing, cough, and sputum production.  Abdomen: Appetite fine. No weight loss. Denies diarrhea, abdominal pain, hematemesis, or blood in stool.  Musculoskeletal: No joint stiffness or swelling. Denies back pain.  : As above.  All other review of systems negative.      Physical Exam:  Vitals:    10/30/20 1530   BP: 130/76   Temp: 98.6 °F (37 °C)     General appearance: Well-developed, well-nourished male in no apparent distress.  Mental status: Alert and oriented. Cooperative with normal affect.  Neuro: Motor and sensory exams grossly intact.  HEENT: Normocephalic. Atraumatic.  Neck: Normal ROM.  Chest: Non labored breathing.  Heart: Regular rate and rhythm.  Abdomen: Soft, non-distended, non-tender. No masses or organomegaly. No evidence of inguinal hernia.  Genitourinary:  Circumcision incision is intact, slight edema on the ventral surface.  No discomfort.  Bilateral testicles unremarkable, no intra testicular or epididymal lesions, no palpable hydroceles.  Clips palpated on the left, pain has remarkably improved after antibiotic therapy.    Labs/Studies:   MICHELE stable, bilateral hydroceles 10/20  Semen analysis number one is clear, no evidence of sperm 10/30/2020    Impression/Plan:     1. Balanitis- circumcision/vasectomy 8/13/20    Patient has done well in regards to his circumcision, healing well, due to the edema will add some Lotrisone for 10 days re-evaluate in 3 months.  Otherwise no discomfort at this time.  First semen analysis is clear, he will bring me a second one in 2 weeks.    2. Desire for sterility- status post vasectomy, please see above.    3.  Epididymal orchitis- antibiotics have resolved, scrotal ultrasound is unremarkable,  call with a recurrence.

## 2020-11-13 ENCOUNTER — CLINICAL SUPPORT (OUTPATIENT)
Dept: UROLOGY | Facility: CLINIC | Age: 40
End: 2020-11-13
Payer: COMMERCIAL

## 2020-11-13 VITALS — BODY MASS INDEX: 32.08 KG/M2 | WEIGHT: 211 LBS | TEMPERATURE: 98 F

## 2020-11-13 DIAGNOSIS — Z30.09 UNWANTED FERTILITY: Primary | ICD-10-CM

## 2020-11-13 PROCEDURE — 99999 PR PBB SHADOW E&M-EST. PATIENT-LVL III: CPT | Mod: PBBFAC,,,

## 2020-11-13 PROCEDURE — 99999 PR PBB SHADOW E&M-EST. PATIENT-LVL III: ICD-10-PCS | Mod: PBBFAC,,,

## 2020-12-01 ENCOUNTER — TELEPHONE (OUTPATIENT)
Dept: FAMILY MEDICINE | Facility: CLINIC | Age: 40
End: 2020-12-01

## 2020-12-01 ENCOUNTER — CLINICAL SUPPORT (OUTPATIENT)
Dept: FAMILY MEDICINE | Facility: CLINIC | Age: 40
End: 2020-12-01
Payer: COMMERCIAL

## 2020-12-01 PROCEDURE — 96372 THER/PROPH/DIAG INJ SC/IM: CPT | Mod: S$GLB,,, | Performed by: INTERNAL MEDICINE

## 2020-12-01 PROCEDURE — 96372 PR INJECTION,THERAP/PROPH/DIAG2ST, IM OR SUBCUT: ICD-10-PCS | Mod: S$GLB,,, | Performed by: INTERNAL MEDICINE

## 2020-12-01 PROCEDURE — 99999 PR PBB SHADOW E&M-EST. PATIENT-LVL II: ICD-10-PCS | Mod: PBBFAC,,,

## 2020-12-01 PROCEDURE — 99999 PR PBB SHADOW E&M-EST. PATIENT-LVL II: CPT | Mod: PBBFAC,,,

## 2020-12-01 RX ORDER — TESTOSTERONE CYPIONATE 200 MG/ML
200 INJECTION, SOLUTION INTRAMUSCULAR
Status: ACTIVE | OUTPATIENT
Start: 2020-12-01 | End: 2021-05-18

## 2020-12-01 RX ADMIN — TESTOSTERONE CYPIONATE 200 MG: 200 INJECTION, SOLUTION INTRAMUSCULAR at 01:12

## 2020-12-01 NOTE — PROGRESS NOTES
See MAR.  Patient tolerated injection well.  Patient informed to wait 15 min after injection.  Patient voiced understanding.

## 2020-12-29 ENCOUNTER — CLINICAL SUPPORT (OUTPATIENT)
Dept: FAMILY MEDICINE | Facility: CLINIC | Age: 40
End: 2020-12-29
Payer: COMMERCIAL

## 2020-12-29 PROCEDURE — 99999 PR PBB SHADOW E&M-EST. PATIENT-LVL II: ICD-10-PCS | Mod: PBBFAC,,,

## 2020-12-29 PROCEDURE — 99999 PR PBB SHADOW E&M-EST. PATIENT-LVL II: CPT | Mod: PBBFAC,,,

## 2020-12-29 PROCEDURE — 96372 THER/PROPH/DIAG INJ SC/IM: CPT | Mod: S$GLB,,, | Performed by: INTERNAL MEDICINE

## 2020-12-29 PROCEDURE — 96372 PR INJECTION,THERAP/PROPH/DIAG2ST, IM OR SUBCUT: ICD-10-PCS | Mod: S$GLB,,, | Performed by: INTERNAL MEDICINE

## 2020-12-29 RX ADMIN — TESTOSTERONE CYPIONATE 200 MG: 200 INJECTION, SOLUTION INTRAMUSCULAR at 02:12

## 2021-01-07 ENCOUNTER — LAB VISIT (OUTPATIENT)
Dept: LAB | Facility: HOSPITAL | Age: 41
End: 2021-01-07
Attending: INTERNAL MEDICINE
Payer: COMMERCIAL

## 2021-01-07 ENCOUNTER — OFFICE VISIT (OUTPATIENT)
Dept: FAMILY MEDICINE | Facility: CLINIC | Age: 41
End: 2021-01-07
Payer: COMMERCIAL

## 2021-01-07 VITALS
TEMPERATURE: 98 F | DIASTOLIC BLOOD PRESSURE: 80 MMHG | SYSTOLIC BLOOD PRESSURE: 120 MMHG | OXYGEN SATURATION: 97 % | WEIGHT: 206 LBS | HEART RATE: 85 BPM | RESPIRATION RATE: 16 BRPM | BODY MASS INDEX: 31.33 KG/M2

## 2021-01-07 DIAGNOSIS — Z00.00 ROUTINE ADULT HEALTH MAINTENANCE: Primary | ICD-10-CM

## 2021-01-07 DIAGNOSIS — Z00.00 ROUTINE ADULT HEALTH MAINTENANCE: ICD-10-CM

## 2021-01-07 LAB
ALBUMIN SERPL BCP-MCNC: 3.9 G/DL (ref 3.5–5.2)
ALP SERPL-CCNC: 68 U/L (ref 55–135)
ALT SERPL W/O P-5'-P-CCNC: 42 U/L (ref 10–44)
ANION GAP SERPL CALC-SCNC: 10 MMOL/L (ref 8–16)
AST SERPL-CCNC: 229 U/L (ref 10–40)
BASOPHILS # BLD AUTO: 0.04 K/UL (ref 0–0.2)
BASOPHILS NFR BLD: 0.8 % (ref 0–1.9)
BILIRUB SERPL-MCNC: 0.5 MG/DL (ref 0.1–1)
BUN SERPL-MCNC: 13 MG/DL (ref 6–20)
CALCIUM SERPL-MCNC: 8.7 MG/DL (ref 8.7–10.5)
CHLORIDE SERPL-SCNC: 107 MMOL/L (ref 95–110)
CHOLEST SERPL-MCNC: 185 MG/DL (ref 120–199)
CHOLEST/HDLC SERPL: 3.9 {RATIO} (ref 2–5)
CO2 SERPL-SCNC: 26 MMOL/L (ref 23–29)
CREAT SERPL-MCNC: 1.2 MG/DL (ref 0.5–1.4)
DIFFERENTIAL METHOD: ABNORMAL
EOSINOPHIL # BLD AUTO: 0.1 K/UL (ref 0–0.5)
EOSINOPHIL NFR BLD: 1.9 % (ref 0–8)
ERYTHROCYTE [DISTWIDTH] IN BLOOD BY AUTOMATED COUNT: 13.2 % (ref 11.5–14.5)
EST. GFR  (AFRICAN AMERICAN): >60 ML/MIN/1.73 M^2
EST. GFR  (NON AFRICAN AMERICAN): >60 ML/MIN/1.73 M^2
GLUCOSE SERPL-MCNC: 87 MG/DL (ref 70–110)
HCT VFR BLD AUTO: 41.6 % (ref 40–54)
HDLC SERPL-MCNC: 47 MG/DL (ref 40–75)
HDLC SERPL: 25.4 % (ref 20–50)
HGB BLD-MCNC: 13.3 G/DL (ref 14–18)
IMM GRANULOCYTES # BLD AUTO: 0 K/UL (ref 0–0.04)
IMM GRANULOCYTES NFR BLD AUTO: 0 % (ref 0–0.5)
LDLC SERPL CALC-MCNC: 118.4 MG/DL (ref 63–159)
LYMPHOCYTES # BLD AUTO: 2.2 K/UL (ref 1–4.8)
LYMPHOCYTES NFR BLD: 42 % (ref 18–48)
MCH RBC QN AUTO: 30.5 PG (ref 27–31)
MCHC RBC AUTO-ENTMCNC: 32 G/DL (ref 32–36)
MCV RBC AUTO: 95 FL (ref 82–98)
MONOCYTES # BLD AUTO: 0.7 K/UL (ref 0.3–1)
MONOCYTES NFR BLD: 13.8 % (ref 4–15)
NEUTROPHILS # BLD AUTO: 2.2 K/UL (ref 1.8–7.7)
NEUTROPHILS NFR BLD: 41.5 % (ref 38–73)
NONHDLC SERPL-MCNC: 138 MG/DL
NRBC BLD-RTO: 0 /100 WBC
PLATELET # BLD AUTO: 200 K/UL (ref 150–350)
PMV BLD AUTO: 12.3 FL (ref 9.2–12.9)
POTASSIUM SERPL-SCNC: 4 MMOL/L (ref 3.5–5.1)
PROT SERPL-MCNC: 6.9 G/DL (ref 6–8.4)
RBC # BLD AUTO: 4.36 M/UL (ref 4.6–6.2)
SODIUM SERPL-SCNC: 143 MMOL/L (ref 136–145)
TRIGL SERPL-MCNC: 98 MG/DL (ref 30–150)
TSH SERPL DL<=0.005 MIU/L-ACNC: 0.83 UIU/ML (ref 0.4–4)
WBC # BLD AUTO: 5.21 K/UL (ref 3.9–12.7)

## 2021-01-07 PROCEDURE — 99999 PR PBB SHADOW E&M-EST. PATIENT-LVL III: ICD-10-PCS | Mod: PBBFAC,,, | Performed by: INTERNAL MEDICINE

## 2021-01-07 PROCEDURE — 99999 PR PBB SHADOW E&M-EST. PATIENT-LVL III: CPT | Mod: PBBFAC,,, | Performed by: INTERNAL MEDICINE

## 2021-01-07 PROCEDURE — 84443 ASSAY THYROID STIM HORMONE: CPT

## 2021-01-07 PROCEDURE — 3008F BODY MASS INDEX DOCD: CPT | Mod: CPTII,S$GLB,, | Performed by: INTERNAL MEDICINE

## 2021-01-07 PROCEDURE — 1126F AMNT PAIN NOTED NONE PRSNT: CPT | Mod: S$GLB,,, | Performed by: INTERNAL MEDICINE

## 2021-01-07 PROCEDURE — 3008F PR BODY MASS INDEX (BMI) DOCUMENTED: ICD-10-PCS | Mod: CPTII,S$GLB,, | Performed by: INTERNAL MEDICINE

## 2021-01-07 PROCEDURE — 84403 ASSAY OF TOTAL TESTOSTERONE: CPT

## 2021-01-07 PROCEDURE — 99395 PR PREVENTIVE VISIT,EST,18-39: ICD-10-PCS | Mod: S$GLB,,, | Performed by: INTERNAL MEDICINE

## 2021-01-07 PROCEDURE — 83036 HEMOGLOBIN GLYCOSYLATED A1C: CPT

## 2021-01-07 PROCEDURE — 80053 COMPREHEN METABOLIC PANEL: CPT

## 2021-01-07 PROCEDURE — 80061 LIPID PANEL: CPT

## 2021-01-07 PROCEDURE — 86703 HIV-1/HIV-2 1 RESULT ANTBDY: CPT

## 2021-01-07 PROCEDURE — 36415 COLL VENOUS BLD VENIPUNCTURE: CPT | Mod: PO

## 2021-01-07 PROCEDURE — 85025 COMPLETE CBC W/AUTO DIFF WBC: CPT

## 2021-01-07 PROCEDURE — 1126F PR PAIN SEVERITY QUANTIFIED, NO PAIN PRESENT: ICD-10-PCS | Mod: S$GLB,,, | Performed by: INTERNAL MEDICINE

## 2021-01-07 PROCEDURE — 99395 PREV VISIT EST AGE 18-39: CPT | Mod: S$GLB,,, | Performed by: INTERNAL MEDICINE

## 2021-01-08 ENCOUNTER — TELEPHONE (OUTPATIENT)
Dept: FAMILY MEDICINE | Facility: CLINIC | Age: 41
End: 2021-01-08

## 2021-01-08 DIAGNOSIS — R79.89 LFT ELEVATION: Primary | ICD-10-CM

## 2021-01-08 LAB
ESTIMATED AVG GLUCOSE: 103 MG/DL (ref 68–131)
HBA1C MFR BLD HPLC: 5.2 % (ref 4–5.6)
HIV 1+2 AB+HIV1 P24 AG SERPL QL IA: NEGATIVE
TESTOST SERPL-MCNC: 489 NG/DL (ref 304–1227)

## 2021-01-11 ENCOUNTER — TELEPHONE (OUTPATIENT)
Dept: FAMILY MEDICINE | Facility: CLINIC | Age: 41
End: 2021-01-11

## 2021-01-15 ENCOUNTER — LAB VISIT (OUTPATIENT)
Dept: LAB | Facility: HOSPITAL | Age: 41
End: 2021-01-15
Attending: INTERNAL MEDICINE
Payer: COMMERCIAL

## 2021-01-15 DIAGNOSIS — R79.89 LFT ELEVATION: ICD-10-CM

## 2021-01-15 PROCEDURE — 84450 TRANSFERASE (AST) (SGOT): CPT

## 2021-01-15 PROCEDURE — 36415 COLL VENOUS BLD VENIPUNCTURE: CPT | Mod: PO

## 2021-01-15 PROCEDURE — 84460 ALANINE AMINO (ALT) (SGPT): CPT

## 2021-01-16 ENCOUNTER — HOSPITAL ENCOUNTER (EMERGENCY)
Facility: HOSPITAL | Age: 41
Discharge: HOME | End: 2021-01-16
Attending: EMERGENCY MEDICINE
Payer: COMMERCIAL

## 2021-01-16 VITALS
SYSTOLIC BLOOD PRESSURE: 131 MMHG | BODY MASS INDEX: 31.61 KG/M2 | DIASTOLIC BLOOD PRESSURE: 74 MMHG | TEMPERATURE: 100 F | OXYGEN SATURATION: 98 % | WEIGHT: 208.56 LBS | HEART RATE: 82 BPM | RESPIRATION RATE: 16 BRPM | HEIGHT: 68 IN

## 2021-01-16 DIAGNOSIS — R74.01 TRANSAMINITIS: Primary | ICD-10-CM

## 2021-01-16 LAB
ALBUMIN SERPL BCP-MCNC: 4.1 G/DL (ref 3.5–5.2)
ALP SERPL-CCNC: 69 U/L (ref 55–135)
ALT SERPL W/O P-5'-P-CCNC: 105 U/L (ref 10–44)
ALT SERPL W/O P-5'-P-CCNC: 131 U/L (ref 10–44)
ANION GAP SERPL CALC-SCNC: 9 MMOL/L (ref 8–16)
APAP SERPL-MCNC: <3 UG/ML (ref 10–20)
AST SERPL-CCNC: 481 U/L (ref 10–40)
AST SERPL-CCNC: 509 U/L (ref 10–40)
BASOPHILS # BLD AUTO: 0.03 K/UL (ref 0–0.2)
BASOPHILS NFR BLD: 0.4 % (ref 0–1.9)
BILIRUB SERPL-MCNC: 0.5 MG/DL (ref 0.1–1)
BUN SERPL-MCNC: 16 MG/DL (ref 6–20)
CALCIUM SERPL-MCNC: 8.8 MG/DL (ref 8.7–10.5)
CHLORIDE SERPL-SCNC: 103 MMOL/L (ref 95–110)
CO2 SERPL-SCNC: 27 MMOL/L (ref 23–29)
CREAT SERPL-MCNC: 1.4 MG/DL (ref 0.5–1.4)
DIFFERENTIAL METHOD: ABNORMAL
EOSINOPHIL # BLD AUTO: 0 K/UL (ref 0–0.5)
EOSINOPHIL NFR BLD: 0.5 % (ref 0–8)
ERYTHROCYTE [DISTWIDTH] IN BLOOD BY AUTOMATED COUNT: 12.9 % (ref 11.5–14.5)
EST. GFR  (AFRICAN AMERICAN): >60 ML/MIN/1.73 M^2
EST. GFR  (NON AFRICAN AMERICAN): >60 ML/MIN/1.73 M^2
ETHANOL SERPL-MCNC: <10 MG/DL
GLUCOSE SERPL-MCNC: 124 MG/DL (ref 70–110)
HCT VFR BLD AUTO: 41.1 % (ref 40–54)
HCV AB SERPL QL IA: NEGATIVE
HCV AB SERPL QL IA: NEGATIVE
HGB BLD-MCNC: 13.6 G/DL (ref 14–18)
IMM GRANULOCYTES # BLD AUTO: 0.03 K/UL (ref 0–0.04)
IMM GRANULOCYTES NFR BLD AUTO: 0.4 % (ref 0–0.5)
INR PPP: 1 (ref 0.8–1.2)
LIPASE SERPL-CCNC: 79 U/L (ref 4–60)
LYMPHOCYTES # BLD AUTO: 1.4 K/UL (ref 1–4.8)
LYMPHOCYTES NFR BLD: 18.7 % (ref 18–48)
MCH RBC QN AUTO: 31 PG (ref 27–31)
MCHC RBC AUTO-ENTMCNC: 33.1 G/DL (ref 32–36)
MCV RBC AUTO: 94 FL (ref 82–98)
MONOCYTES # BLD AUTO: 0.6 K/UL (ref 0.3–1)
MONOCYTES NFR BLD: 7.8 % (ref 4–15)
NEUTROPHILS # BLD AUTO: 5.3 K/UL (ref 1.8–7.7)
NEUTROPHILS NFR BLD: 72.2 % (ref 38–73)
NRBC BLD-RTO: 0 /100 WBC
PLATELET # BLD AUTO: 210 K/UL (ref 150–350)
PMV BLD AUTO: 11.7 FL (ref 9.2–12.9)
POTASSIUM SERPL-SCNC: 4.1 MMOL/L (ref 3.5–5.1)
PROT SERPL-MCNC: 7.2 G/DL (ref 6–8.4)
PROTHROMBIN TIME: 10.4 SEC (ref 9–12.5)
RBC # BLD AUTO: 4.39 M/UL (ref 4.6–6.2)
SODIUM SERPL-SCNC: 139 MMOL/L (ref 136–145)
WBC # BLD AUTO: 7.33 K/UL (ref 3.9–12.7)

## 2021-01-16 PROCEDURE — 36415 COLL VENOUS BLD VENIPUNCTURE: CPT

## 2021-01-16 PROCEDURE — 80143 DRUG ASSAY ACETAMINOPHEN: CPT

## 2021-01-16 PROCEDURE — 85610 PROTHROMBIN TIME: CPT

## 2021-01-16 PROCEDURE — 82077 ASSAY SPEC XCP UR&BREATH IA: CPT

## 2021-01-16 PROCEDURE — 80053 COMPREHEN METABOLIC PANEL: CPT

## 2021-01-16 PROCEDURE — 85025 COMPLETE CBC W/AUTO DIFF WBC: CPT

## 2021-01-16 PROCEDURE — 83690 ASSAY OF LIPASE: CPT

## 2021-01-16 PROCEDURE — 99283 EMERGENCY DEPT VISIT LOW MDM: CPT

## 2021-01-16 PROCEDURE — 80074 ACUTE HEPATITIS PANEL: CPT

## 2021-01-16 PROCEDURE — 36000 PLACE NEEDLE IN VEIN: CPT

## 2021-01-18 LAB
HAV IGM SERPL QL IA: NEGATIVE
HBV CORE IGM SERPL QL IA: NEGATIVE
HBV SURFACE AG SERPL QL IA: NEGATIVE
HCV AB SERPL QL IA: NEGATIVE

## 2021-01-19 ENCOUNTER — TELEPHONE (OUTPATIENT)
Dept: FAMILY MEDICINE | Facility: CLINIC | Age: 41
End: 2021-01-19

## 2021-01-19 DIAGNOSIS — R79.89 LFT ELEVATION: Primary | ICD-10-CM

## 2021-01-21 ENCOUNTER — HOSPITAL ENCOUNTER (OUTPATIENT)
Dept: RADIOLOGY | Facility: HOSPITAL | Age: 41
Discharge: HOME | End: 2021-01-21
Attending: INTERNAL MEDICINE
Payer: COMMERCIAL

## 2021-01-21 ENCOUNTER — OFFICE VISIT (OUTPATIENT)
Dept: GASTROENTEROLOGY | Facility: CLINIC | Age: 41
End: 2021-01-21
Payer: COMMERCIAL

## 2021-01-21 VITALS
HEIGHT: 68 IN | SYSTOLIC BLOOD PRESSURE: 110 MMHG | BODY MASS INDEX: 30.94 KG/M2 | HEART RATE: 64 BPM | WEIGHT: 204.13 LBS | DIASTOLIC BLOOD PRESSURE: 70 MMHG

## 2021-01-21 DIAGNOSIS — R79.89 LFT ELEVATION: ICD-10-CM

## 2021-01-21 PROCEDURE — 99999 PR PBB SHADOW E&M-EST. PATIENT-LVL III: ICD-10-PCS | Mod: PBBFAC,,, | Performed by: INTERNAL MEDICINE

## 2021-01-21 PROCEDURE — 3008F BODY MASS INDEX DOCD: CPT | Mod: CPTII,S$GLB,, | Performed by: INTERNAL MEDICINE

## 2021-01-21 PROCEDURE — 3008F PR BODY MASS INDEX (BMI) DOCUMENTED: ICD-10-PCS | Mod: CPTII,S$GLB,, | Performed by: INTERNAL MEDICINE

## 2021-01-21 PROCEDURE — 99203 OFFICE O/P NEW LOW 30 MIN: CPT | Mod: S$GLB,,, | Performed by: INTERNAL MEDICINE

## 2021-01-21 PROCEDURE — 99203 PR OFFICE/OUTPT VISIT, NEW, LEVL III, 30-44 MIN: ICD-10-PCS | Mod: S$GLB,,, | Performed by: INTERNAL MEDICINE

## 2021-01-21 PROCEDURE — 1126F AMNT PAIN NOTED NONE PRSNT: CPT | Mod: S$GLB,,, | Performed by: INTERNAL MEDICINE

## 2021-01-21 PROCEDURE — 76700 US EXAM ABDOM COMPLETE: CPT | Mod: TC

## 2021-01-21 PROCEDURE — 1126F PR PAIN SEVERITY QUANTIFIED, NO PAIN PRESENT: ICD-10-PCS | Mod: S$GLB,,, | Performed by: INTERNAL MEDICINE

## 2021-01-21 PROCEDURE — 99999 PR PBB SHADOW E&M-EST. PATIENT-LVL III: CPT | Mod: PBBFAC,,, | Performed by: INTERNAL MEDICINE

## 2021-01-29 ENCOUNTER — TELEPHONE (OUTPATIENT)
Dept: HEPATOLOGY | Facility: CLINIC | Age: 41
End: 2021-01-29

## 2021-02-02 ENCOUNTER — NURSE TRIAGE (OUTPATIENT)
Dept: ADMINISTRATIVE | Facility: CLINIC | Age: 41
End: 2021-02-02

## 2021-02-09 ENCOUNTER — OFFICE VISIT (OUTPATIENT)
Dept: UROLOGY | Facility: CLINIC | Age: 41
End: 2021-02-09
Payer: COMMERCIAL

## 2021-02-09 VITALS
SYSTOLIC BLOOD PRESSURE: 140 MMHG | WEIGHT: 203.69 LBS | BODY MASS INDEX: 30.97 KG/M2 | DIASTOLIC BLOOD PRESSURE: 68 MMHG | TEMPERATURE: 99 F

## 2021-02-09 DIAGNOSIS — N45.3 EPIDIDYMOORCHITIS: Primary | ICD-10-CM

## 2021-02-09 DIAGNOSIS — N48.1 BALANITIS: ICD-10-CM

## 2021-02-09 PROCEDURE — 99214 PR OFFICE/OUTPT VISIT, EST, LEVL IV, 30-39 MIN: ICD-10-PCS | Mod: S$GLB,,, | Performed by: UROLOGY

## 2021-02-09 PROCEDURE — 3008F BODY MASS INDEX DOCD: CPT | Mod: CPTII,S$GLB,, | Performed by: UROLOGY

## 2021-02-09 PROCEDURE — 1126F PR PAIN SEVERITY QUANTIFIED, NO PAIN PRESENT: ICD-10-PCS | Mod: S$GLB,,, | Performed by: UROLOGY

## 2021-02-09 PROCEDURE — 1126F AMNT PAIN NOTED NONE PRSNT: CPT | Mod: S$GLB,,, | Performed by: UROLOGY

## 2021-02-09 PROCEDURE — 3008F PR BODY MASS INDEX (BMI) DOCUMENTED: ICD-10-PCS | Mod: CPTII,S$GLB,, | Performed by: UROLOGY

## 2021-02-09 PROCEDURE — 99999 PR PBB SHADOW E&M-EST. PATIENT-LVL II: ICD-10-PCS | Mod: PBBFAC,,, | Performed by: UROLOGY

## 2021-02-09 PROCEDURE — 99214 OFFICE O/P EST MOD 30 MIN: CPT | Mod: S$GLB,,, | Performed by: UROLOGY

## 2021-02-09 PROCEDURE — 99999 PR PBB SHADOW E&M-EST. PATIENT-LVL II: CPT | Mod: PBBFAC,,, | Performed by: UROLOGY

## 2021-02-10 ENCOUNTER — TELEPHONE (OUTPATIENT)
Dept: HEPATOLOGY | Facility: CLINIC | Age: 41
End: 2021-02-10

## 2021-02-18 ENCOUNTER — LAB VISIT (OUTPATIENT)
Dept: LAB | Facility: HOSPITAL | Age: 41
End: 2021-02-18
Attending: INTERNAL MEDICINE
Payer: COMMERCIAL

## 2021-02-18 DIAGNOSIS — R79.89 LFT ELEVATION: ICD-10-CM

## 2021-02-18 PROCEDURE — 36415 COLL VENOUS BLD VENIPUNCTURE: CPT

## 2021-02-18 PROCEDURE — 80076 HEPATIC FUNCTION PANEL: CPT

## 2021-02-19 LAB
ALBUMIN SERPL BCP-MCNC: 4 G/DL (ref 3.5–5.2)
ALP SERPL-CCNC: 65 U/L (ref 55–135)
ALT SERPL W/O P-5'-P-CCNC: 23 U/L (ref 10–44)
AST SERPL-CCNC: 22 U/L (ref 10–40)
BILIRUB DIRECT SERPL-MCNC: 0.3 MG/DL (ref 0.1–0.3)
BILIRUB SERPL-MCNC: 0.9 MG/DL (ref 0.1–1)
PROT SERPL-MCNC: 6.9 G/DL (ref 6–8.4)

## 2021-02-22 ENCOUNTER — TELEPHONE (OUTPATIENT)
Dept: GASTROENTEROLOGY | Facility: CLINIC | Age: 41
End: 2021-02-22

## 2021-03-16 ENCOUNTER — TELEPHONE (OUTPATIENT)
Dept: FAMILY MEDICINE | Facility: CLINIC | Age: 41
End: 2021-03-16

## 2021-03-16 DIAGNOSIS — G89.29 CHRONIC MIDLINE LOW BACK PAIN WITHOUT SCIATICA: Primary | ICD-10-CM

## 2021-03-16 DIAGNOSIS — M54.50 CHRONIC MIDLINE LOW BACK PAIN WITHOUT SCIATICA: Primary | ICD-10-CM

## 2021-03-18 ENCOUNTER — TELEPHONE (OUTPATIENT)
Dept: FAMILY MEDICINE | Facility: CLINIC | Age: 41
End: 2021-03-18

## 2021-03-19 ENCOUNTER — TELEPHONE (OUTPATIENT)
Dept: FAMILY MEDICINE | Facility: CLINIC | Age: 41
End: 2021-03-19

## 2021-03-22 ENCOUNTER — TELEPHONE (OUTPATIENT)
Dept: FAMILY MEDICINE | Facility: CLINIC | Age: 41
End: 2021-03-22

## 2021-03-24 ENCOUNTER — PATIENT OUTREACH (OUTPATIENT)
Dept: ADMINISTRATIVE | Facility: OTHER | Age: 41
End: 2021-03-24

## 2021-03-24 ENCOUNTER — OFFICE VISIT (OUTPATIENT)
Dept: FAMILY MEDICINE | Facility: CLINIC | Age: 41
End: 2021-03-24
Payer: COMMERCIAL

## 2021-03-24 ENCOUNTER — TELEPHONE (OUTPATIENT)
Dept: ORTHOPEDICS | Facility: CLINIC | Age: 41
End: 2021-03-24

## 2021-03-24 ENCOUNTER — HOSPITAL ENCOUNTER (OUTPATIENT)
Dept: RADIOLOGY | Facility: HOSPITAL | Age: 41
Discharge: HOME | End: 2021-03-24
Attending: INTERNAL MEDICINE
Payer: COMMERCIAL

## 2021-03-24 VITALS
OXYGEN SATURATION: 98 % | HEART RATE: 96 BPM | DIASTOLIC BLOOD PRESSURE: 82 MMHG | WEIGHT: 206 LBS | BODY MASS INDEX: 31.22 KG/M2 | RESPIRATION RATE: 18 BRPM | HEIGHT: 68 IN | TEMPERATURE: 98 F | SYSTOLIC BLOOD PRESSURE: 116 MMHG

## 2021-03-24 DIAGNOSIS — M25.512 CHRONIC LEFT SHOULDER PAIN: Primary | ICD-10-CM

## 2021-03-24 DIAGNOSIS — G89.29 CHRONIC LEFT SHOULDER PAIN: Primary | ICD-10-CM

## 2021-03-24 DIAGNOSIS — G89.29 CHRONIC LEFT SHOULDER PAIN: ICD-10-CM

## 2021-03-24 DIAGNOSIS — M25.512 CHRONIC LEFT SHOULDER PAIN: ICD-10-CM

## 2021-03-24 PROCEDURE — 99213 PR OFFICE/OUTPT VISIT, EST, LEVL III, 20-29 MIN: ICD-10-PCS | Mod: S$GLB,,, | Performed by: INTERNAL MEDICINE

## 2021-03-24 PROCEDURE — 99213 OFFICE O/P EST LOW 20 MIN: CPT | Mod: S$GLB,,, | Performed by: INTERNAL MEDICINE

## 2021-03-24 PROCEDURE — 73030 X-RAY EXAM OF SHOULDER: CPT | Mod: TC,FY,PO,LT

## 2021-03-24 PROCEDURE — 73030 XR SHOULDER COMPLETE 2 OR MORE VIEWS LEFT: ICD-10-PCS | Mod: 26,LT,, | Performed by: RADIOLOGY

## 2021-03-24 PROCEDURE — 99999 PR PBB SHADOW E&M-EST. PATIENT-LVL IV: ICD-10-PCS | Mod: PBBFAC,,, | Performed by: INTERNAL MEDICINE

## 2021-03-24 PROCEDURE — 1126F AMNT PAIN NOTED NONE PRSNT: CPT | Mod: S$GLB,,, | Performed by: INTERNAL MEDICINE

## 2021-03-24 PROCEDURE — 99999 PR PBB SHADOW E&M-EST. PATIENT-LVL IV: CPT | Mod: PBBFAC,,, | Performed by: INTERNAL MEDICINE

## 2021-03-24 PROCEDURE — 3008F PR BODY MASS INDEX (BMI) DOCUMENTED: ICD-10-PCS | Mod: CPTII,S$GLB,, | Performed by: INTERNAL MEDICINE

## 2021-03-24 PROCEDURE — 73030 X-RAY EXAM OF SHOULDER: CPT | Mod: 26,LT,, | Performed by: RADIOLOGY

## 2021-03-24 PROCEDURE — 3008F BODY MASS INDEX DOCD: CPT | Mod: CPTII,S$GLB,, | Performed by: INTERNAL MEDICINE

## 2021-03-24 PROCEDURE — 1126F PR PAIN SEVERITY QUANTIFIED, NO PAIN PRESENT: ICD-10-PCS | Mod: S$GLB,,, | Performed by: INTERNAL MEDICINE

## 2021-03-25 ENCOUNTER — OFFICE VISIT (OUTPATIENT)
Dept: ORTHOPEDICS | Facility: CLINIC | Age: 41
End: 2021-03-25
Payer: COMMERCIAL

## 2021-03-25 VITALS
HEART RATE: 68 BPM | WEIGHT: 206.13 LBS | SYSTOLIC BLOOD PRESSURE: 110 MMHG | HEIGHT: 68 IN | BODY MASS INDEX: 31.24 KG/M2 | DIASTOLIC BLOOD PRESSURE: 73 MMHG

## 2021-03-25 DIAGNOSIS — G89.29 CHRONIC LEFT SHOULDER PAIN: ICD-10-CM

## 2021-03-25 DIAGNOSIS — G89.29 CHRONIC LEFT SHOULDER PAIN: Primary | ICD-10-CM

## 2021-03-25 DIAGNOSIS — M25.512 CHRONIC LEFT SHOULDER PAIN: Primary | ICD-10-CM

## 2021-03-25 DIAGNOSIS — M25.512 CHRONIC LEFT SHOULDER PAIN: ICD-10-CM

## 2021-03-25 PROCEDURE — 1125F PR PAIN SEVERITY QUANTIFIED, PAIN PRESENT: ICD-10-PCS | Mod: S$GLB,,, | Performed by: FAMILY MEDICINE

## 2021-03-25 PROCEDURE — 3008F PR BODY MASS INDEX (BMI) DOCUMENTED: ICD-10-PCS | Mod: CPTII,S$GLB,, | Performed by: FAMILY MEDICINE

## 2021-03-25 PROCEDURE — 20610 DRAIN/INJ JOINT/BURSA W/O US: CPT | Mod: LT,S$GLB,, | Performed by: FAMILY MEDICINE

## 2021-03-25 PROCEDURE — 1125F AMNT PAIN NOTED PAIN PRSNT: CPT | Mod: S$GLB,,, | Performed by: FAMILY MEDICINE

## 2021-03-25 PROCEDURE — 99999 PR PBB SHADOW E&M-EST. PATIENT-LVL III: CPT | Mod: PBBFAC,,, | Performed by: FAMILY MEDICINE

## 2021-03-25 PROCEDURE — 3008F BODY MASS INDEX DOCD: CPT | Mod: CPTII,S$GLB,, | Performed by: FAMILY MEDICINE

## 2021-03-25 PROCEDURE — 99204 OFFICE O/P NEW MOD 45 MIN: CPT | Mod: 25,S$GLB,, | Performed by: FAMILY MEDICINE

## 2021-03-25 PROCEDURE — 99204 PR OFFICE/OUTPT VISIT, NEW, LEVL IV, 45-59 MIN: ICD-10-PCS | Mod: 25,S$GLB,, | Performed by: FAMILY MEDICINE

## 2021-03-25 PROCEDURE — 99999 PR PBB SHADOW E&M-EST. PATIENT-LVL III: ICD-10-PCS | Mod: PBBFAC,,, | Performed by: FAMILY MEDICINE

## 2021-03-25 PROCEDURE — 20610 LARGE JOINT ASPIRATION/INJECTION: L GLENOHUMERAL: ICD-10-PCS | Mod: LT,S$GLB,, | Performed by: FAMILY MEDICINE

## 2021-03-25 RX ORDER — BETAMETHASONE SODIUM PHOSPHATE AND BETAMETHASONE ACETATE 3; 3 MG/ML; MG/ML
6 INJECTION, SUSPENSION INTRA-ARTICULAR; INTRALESIONAL; INTRAMUSCULAR; SOFT TISSUE
Status: DISCONTINUED | OUTPATIENT
Start: 2021-03-25 | End: 2021-03-25 | Stop reason: HOSPADM

## 2021-03-25 RX ADMIN — BETAMETHASONE SODIUM PHOSPHATE AND BETAMETHASONE ACETATE 6 MG: 3; 3 INJECTION, SUSPENSION INTRA-ARTICULAR; INTRALESIONAL; INTRAMUSCULAR; SOFT TISSUE at 09:03

## 2021-03-30 ENCOUNTER — CLINICAL SUPPORT (OUTPATIENT)
Dept: REHABILITATION | Facility: HOSPITAL | Age: 41
End: 2021-03-30
Payer: COMMERCIAL

## 2021-03-30 DIAGNOSIS — M25.512 CHRONIC LEFT SHOULDER PAIN: Primary | ICD-10-CM

## 2021-03-30 DIAGNOSIS — G89.29 CHRONIC LEFT SHOULDER PAIN: Primary | ICD-10-CM

## 2021-03-30 DIAGNOSIS — R29.898 SHOULDER WEAKNESS: ICD-10-CM

## 2021-03-30 PROCEDURE — 97161 PT EVAL LOW COMPLEX 20 MIN: CPT

## 2021-03-30 PROCEDURE — 97110 THERAPEUTIC EXERCISES: CPT

## 2021-04-09 ENCOUNTER — CLINICAL SUPPORT (OUTPATIENT)
Dept: REHABILITATION | Facility: HOSPITAL | Age: 41
End: 2021-04-09
Payer: COMMERCIAL

## 2021-04-09 DIAGNOSIS — M25.512 CHRONIC LEFT SHOULDER PAIN: Primary | ICD-10-CM

## 2021-04-09 DIAGNOSIS — G89.29 CHRONIC LEFT SHOULDER PAIN: Primary | ICD-10-CM

## 2021-04-09 PROCEDURE — 97110 THERAPEUTIC EXERCISES: CPT

## 2021-04-09 PROCEDURE — 97140 MANUAL THERAPY 1/> REGIONS: CPT

## 2021-04-16 ENCOUNTER — CLINICAL SUPPORT (OUTPATIENT)
Dept: REHABILITATION | Facility: HOSPITAL | Age: 41
End: 2021-04-16
Payer: COMMERCIAL

## 2021-04-16 DIAGNOSIS — M25.512 CHRONIC LEFT SHOULDER PAIN: ICD-10-CM

## 2021-04-16 DIAGNOSIS — G89.29 CHRONIC LEFT SHOULDER PAIN: ICD-10-CM

## 2021-04-16 DIAGNOSIS — R29.898 SHOULDER WEAKNESS: Primary | ICD-10-CM

## 2021-04-16 PROCEDURE — 97140 MANUAL THERAPY 1/> REGIONS: CPT

## 2021-04-28 ENCOUNTER — PATIENT MESSAGE (OUTPATIENT)
Dept: RESEARCH | Facility: HOSPITAL | Age: 41
End: 2021-04-28

## 2021-05-07 ENCOUNTER — TELEPHONE (OUTPATIENT)
Dept: UROLOGY | Facility: CLINIC | Age: 41
End: 2021-05-07

## 2021-05-07 RX ORDER — CEFDINIR 300 MG/1
300 CAPSULE ORAL 2 TIMES DAILY
Qty: 28 CAPSULE | Refills: 0 | Status: SHIPPED | OUTPATIENT
Start: 2021-05-07 | End: 2021-05-17

## 2021-10-04 ENCOUNTER — TELEPHONE (OUTPATIENT)
Dept: UROLOGY | Facility: CLINIC | Age: 41
End: 2021-10-04

## 2021-10-04 RX ORDER — CEFDINIR 300 MG/1
300 CAPSULE ORAL 2 TIMES DAILY
Qty: 20 CAPSULE | Refills: 0 | Status: SHIPPED | OUTPATIENT
Start: 2021-10-04 | End: 2021-10-14

## 2022-01-03 ENCOUNTER — TELEPHONE (OUTPATIENT)
Dept: FAMILY MEDICINE | Facility: CLINIC | Age: 42
End: 2022-01-03
Payer: COMMERCIAL

## 2022-01-03 NOTE — TELEPHONE ENCOUNTER
----- Message from Dory Denton sent at 1/3/2022  9:10 AM CST -----  Contact: [phone]  Patient called, stated that he want to know if he have to go and have another covid 19 test before coming in to his yearly. Patient was covid 19 positive on 12/29/21, patient does not have any symptoms. Please advise. Thank you

## 2022-01-03 NOTE — TELEPHONE ENCOUNTER
Call returned.  Pt stated that he has had no symptoms what so ever.  His daughter tested positive so his family was tested and only he and his daughter were positive.

## 2022-01-07 ENCOUNTER — LAB VISIT (OUTPATIENT)
Dept: LAB | Facility: HOSPITAL | Age: 42
End: 2022-01-07
Attending: INTERNAL MEDICINE
Payer: COMMERCIAL

## 2022-01-07 ENCOUNTER — OFFICE VISIT (OUTPATIENT)
Dept: INTERNAL MEDICINE | Facility: CLINIC | Age: 42
End: 2022-01-07
Payer: COMMERCIAL

## 2022-01-07 VITALS
DIASTOLIC BLOOD PRESSURE: 72 MMHG | WEIGHT: 205.69 LBS | TEMPERATURE: 98 F | RESPIRATION RATE: 16 BRPM | BODY MASS INDEX: 31.27 KG/M2 | SYSTOLIC BLOOD PRESSURE: 110 MMHG | HEART RATE: 58 BPM | OXYGEN SATURATION: 98 %

## 2022-01-07 DIAGNOSIS — Z00.00 ROUTINE ADULT HEALTH MAINTENANCE: ICD-10-CM

## 2022-01-07 DIAGNOSIS — Z00.00 ROUTINE ADULT HEALTH MAINTENANCE: Primary | ICD-10-CM

## 2022-01-07 LAB
ALBUMIN SERPL BCP-MCNC: 4 G/DL (ref 3.5–5.2)
ALP SERPL-CCNC: 62 U/L (ref 55–135)
ALT SERPL W/O P-5'-P-CCNC: 22 U/L (ref 10–44)
ANION GAP SERPL CALC-SCNC: 5 MMOL/L (ref 8–16)
AST SERPL-CCNC: 23 U/L (ref 10–40)
BASOPHILS # BLD AUTO: 0.02 K/UL (ref 0–0.2)
BASOPHILS NFR BLD: 0.5 % (ref 0–1.9)
BILIRUB SERPL-MCNC: 1 MG/DL (ref 0.1–1)
BUN SERPL-MCNC: 16 MG/DL (ref 6–20)
CALCIUM SERPL-MCNC: 9 MG/DL (ref 8.7–10.5)
CHLORIDE SERPL-SCNC: 108 MMOL/L (ref 95–110)
CHOLEST SERPL-MCNC: 213 MG/DL (ref 120–199)
CHOLEST/HDLC SERPL: 4.4 {RATIO} (ref 2–5)
CO2 SERPL-SCNC: 27 MMOL/L (ref 23–29)
COMPLEXED PSA SERPL-MCNC: 0.4 NG/ML (ref 0–4)
CREAT SERPL-MCNC: 1.2 MG/DL (ref 0.5–1.4)
DIFFERENTIAL METHOD: ABNORMAL
EOSINOPHIL # BLD AUTO: 0.1 K/UL (ref 0–0.5)
EOSINOPHIL NFR BLD: 1.6 % (ref 0–8)
ERYTHROCYTE [DISTWIDTH] IN BLOOD BY AUTOMATED COUNT: 13 % (ref 11.5–14.5)
EST. GFR  (AFRICAN AMERICAN): >60 ML/MIN/1.73 M^2
EST. GFR  (NON AFRICAN AMERICAN): >60 ML/MIN/1.73 M^2
ESTIMATED AVG GLUCOSE: 111 MG/DL (ref 68–131)
GLUCOSE SERPL-MCNC: 93 MG/DL (ref 70–110)
HBA1C MFR BLD: 5.5 % (ref 4–5.6)
HCT VFR BLD AUTO: 40.1 % (ref 40–54)
HDLC SERPL-MCNC: 48 MG/DL (ref 40–75)
HDLC SERPL: 22.5 % (ref 20–50)
HGB BLD-MCNC: 13.2 G/DL (ref 14–18)
IMM GRANULOCYTES # BLD AUTO: 0 K/UL (ref 0–0.04)
IMM GRANULOCYTES NFR BLD AUTO: 0 % (ref 0–0.5)
LDLC SERPL CALC-MCNC: 144 MG/DL (ref 63–159)
LYMPHOCYTES # BLD AUTO: 2.3 K/UL (ref 1–4.8)
LYMPHOCYTES NFR BLD: 51.4 % (ref 18–48)
MCH RBC QN AUTO: 30.3 PG (ref 27–31)
MCHC RBC AUTO-ENTMCNC: 32.9 G/DL (ref 32–36)
MCV RBC AUTO: 92 FL (ref 82–98)
MONOCYTES # BLD AUTO: 0.6 K/UL (ref 0.3–1)
MONOCYTES NFR BLD: 14 % (ref 4–15)
NEUTROPHILS # BLD AUTO: 1.5 K/UL (ref 1.8–7.7)
NEUTROPHILS NFR BLD: 32.5 % (ref 38–73)
NONHDLC SERPL-MCNC: 165 MG/DL
NRBC BLD-RTO: 0 /100 WBC
PLATELET # BLD AUTO: 201 K/UL (ref 150–450)
PMV BLD AUTO: 12.4 FL (ref 9.2–12.9)
POTASSIUM SERPL-SCNC: 4.4 MMOL/L (ref 3.5–5.1)
PROT SERPL-MCNC: 6.8 G/DL (ref 6–8.4)
RBC # BLD AUTO: 4.35 M/UL (ref 4.6–6.2)
SODIUM SERPL-SCNC: 140 MMOL/L (ref 136–145)
TRIGL SERPL-MCNC: 105 MG/DL (ref 30–150)
TSH SERPL DL<=0.005 MIU/L-ACNC: 1.71 UIU/ML (ref 0.4–4)
WBC # BLD AUTO: 4.44 K/UL (ref 3.9–12.7)

## 2022-01-07 PROCEDURE — 99395 PREV VISIT EST AGE 18-39: CPT | Mod: S$GLB,,, | Performed by: INTERNAL MEDICINE

## 2022-01-07 PROCEDURE — 3074F SYST BP LT 130 MM HG: CPT | Mod: CPTII,S$GLB,, | Performed by: INTERNAL MEDICINE

## 2022-01-07 PROCEDURE — 36415 COLL VENOUS BLD VENIPUNCTURE: CPT | Performed by: INTERNAL MEDICINE

## 2022-01-07 PROCEDURE — 1159F PR MEDICATION LIST DOCUMENTED IN MEDICAL RECORD: ICD-10-PCS | Mod: CPTII,S$GLB,, | Performed by: INTERNAL MEDICINE

## 2022-01-07 PROCEDURE — 3008F PR BODY MASS INDEX (BMI) DOCUMENTED: ICD-10-PCS | Mod: CPTII,S$GLB,, | Performed by: INTERNAL MEDICINE

## 2022-01-07 PROCEDURE — 87389 HIV-1 AG W/HIV-1&-2 AB AG IA: CPT | Performed by: INTERNAL MEDICINE

## 2022-01-07 PROCEDURE — 3078F PR MOST RECENT DIASTOLIC BLOOD PRESSURE < 80 MM HG: ICD-10-PCS | Mod: CPTII,S$GLB,, | Performed by: INTERNAL MEDICINE

## 2022-01-07 PROCEDURE — 99999 PR PBB SHADOW E&M-EST. PATIENT-LVL III: ICD-10-PCS | Mod: PBBFAC,,, | Performed by: INTERNAL MEDICINE

## 2022-01-07 PROCEDURE — 84153 ASSAY OF PSA TOTAL: CPT | Performed by: INTERNAL MEDICINE

## 2022-01-07 PROCEDURE — 3078F DIAST BP <80 MM HG: CPT | Mod: CPTII,S$GLB,, | Performed by: INTERNAL MEDICINE

## 2022-01-07 PROCEDURE — 80053 COMPREHEN METABOLIC PANEL: CPT | Performed by: INTERNAL MEDICINE

## 2022-01-07 PROCEDURE — 84443 ASSAY THYROID STIM HORMONE: CPT | Performed by: INTERNAL MEDICINE

## 2022-01-07 PROCEDURE — 1159F MED LIST DOCD IN RCRD: CPT | Mod: CPTII,S$GLB,, | Performed by: INTERNAL MEDICINE

## 2022-01-07 PROCEDURE — 85025 COMPLETE CBC W/AUTO DIFF WBC: CPT | Performed by: INTERNAL MEDICINE

## 2022-01-07 PROCEDURE — 99395 PR PREVENTIVE VISIT,EST,18-39: ICD-10-PCS | Mod: S$GLB,,, | Performed by: INTERNAL MEDICINE

## 2022-01-07 PROCEDURE — 3008F BODY MASS INDEX DOCD: CPT | Mod: CPTII,S$GLB,, | Performed by: INTERNAL MEDICINE

## 2022-01-07 PROCEDURE — 83036 HEMOGLOBIN GLYCOSYLATED A1C: CPT | Performed by: INTERNAL MEDICINE

## 2022-01-07 PROCEDURE — 80061 LIPID PANEL: CPT | Performed by: INTERNAL MEDICINE

## 2022-01-07 PROCEDURE — 99999 PR PBB SHADOW E&M-EST. PATIENT-LVL III: CPT | Mod: PBBFAC,,, | Performed by: INTERNAL MEDICINE

## 2022-01-07 PROCEDURE — 86592 SYPHILIS TEST NON-TREP QUAL: CPT | Performed by: INTERNAL MEDICINE

## 2022-01-07 PROCEDURE — 3074F PR MOST RECENT SYSTOLIC BLOOD PRESSURE < 130 MM HG: ICD-10-PCS | Mod: CPTII,S$GLB,, | Performed by: INTERNAL MEDICINE

## 2022-01-07 NOTE — PROGRESS NOTES
Subjective:       Patient ID: Sukumar Bob is a 41 y.o. male.    Chief Complaint: Annual Exam    Physical-------no new symptoms today---------------    Past Medical History:   Diagnosis Date    HSV (herpes simplex virus) infection     Mild chronic anemia      Past Surgical History:   Procedure Laterality Date    CIRCUMCISION N/A 8/13/2020    Procedure: CIRCUMCISION;  Surgeon: Abraham Reaves MD;  Location: [facility] OR;  Service: Urology;  Laterality: N/A;    none      VASECTOMY Bilateral 8/13/2020    Procedure: VASECTOMY;  Surgeon: Abraham Reaves MD;  Location: [facility] OR;  Service: Urology;  Laterality: Bilateral;     Family History   Problem Relation Age of Onset    No Known Problems Mother     No Known Problems Father     No Known Problems Sister     No Known Problems Brother     Hypertension Maternal Grandmother     Stroke Maternal Grandmother     No Known Problems Sister     No Known Problems Brother     No Known Problems Son     No Known Problems Son     Throat cancer Cousin     Diabetes Neg Hx     Heart disease Neg Hx      Social History     Socioeconomic History    Marital status:     Number of children: 2   Occupational History    Occupation:      Comment: [employer]   Tobacco Use    Smoking status: Never Smoker    Smokeless tobacco: Never Used   Substance and Sexual Activity    Alcohol use: Yes     Comment: socially- no alcohol for 72 hours before surgery    Drug use: No    Sexual activity: Yes     Partners: Female   Social History Narrative    He wears seatbelt. He is expecting a daughter soon.     Review of Systems   Constitutional: Negative for activity change, appetite change, chills, diaphoresis, fatigue, fever and unexpected weight change.   HENT: Negative for drooling, ear discharge, ear pain, facial swelling, hearing loss, mouth sores, nosebleeds, postnasal drip, rhinorrhea, sinus pressure, sneezing, sore throat, tinnitus,  trouble swallowing and voice change.    Eyes: Negative for photophobia, redness and visual disturbance.   Respiratory: Negative for apnea, cough, choking, chest tightness, shortness of breath and wheezing.    Cardiovascular: Negative for chest pain, palpitations and leg swelling.   Gastrointestinal: Negative for abdominal distention, abdominal pain, anal bleeding, blood in stool, constipation, diarrhea, nausea, rectal pain and vomiting.   Endocrine: Negative for cold intolerance, heat intolerance, polydipsia, polyphagia and polyuria.   Genitourinary: Negative for difficulty urinating, dysuria, enuresis, flank pain, frequency, genital sores, hematuria and urgency.   Musculoskeletal: Negative for arthralgias, back pain, gait problem, joint swelling, myalgias, neck pain and neck stiffness.   Skin: Negative for color change, pallor, rash and wound.   Allergic/Immunologic: Negative for food allergies and immunocompromised state.   Neurological: Negative for dizziness, tremors, seizures, syncope, facial asymmetry, speech difficulty, weakness, light-headedness, numbness and headaches.   Hematological: Negative for adenopathy. Does not bruise/bleed easily.   Psychiatric/Behavioral: Negative for agitation, behavioral problems, confusion, decreased concentration, dysphoric mood, hallucinations, self-injury, sleep disturbance and suicidal ideas. The patient is not nervous/anxious and is not hyperactive.        Objective:      Physical Exam  Vitals and nursing note reviewed.   Constitutional:       General: He is not in acute distress.     Appearance: He is well-developed and well-nourished. He is not diaphoretic.   HENT:      Head: Normocephalic and atraumatic.      Mouth/Throat:      Pharynx: No oropharyngeal exudate.   Eyes:      General: No scleral icterus.     Pupils: Pupils are equal, round, and reactive to light.   Neck:      Thyroid: No thyromegaly.      Vascular: No carotid bruit or JVD.      Trachea: No tracheal  deviation.   Cardiovascular:      Rate and Rhythm: Normal rate and regular rhythm.      Pulses: Intact distal pulses.      Heart sounds: Normal heart sounds.   Pulmonary:      Effort: Pulmonary effort is normal. No respiratory distress.      Breath sounds: Normal breath sounds. No wheezing or rales.   Chest:      Chest wall: No tenderness.   Abdominal:      General: Bowel sounds are normal. There is no distension.      Palpations: Abdomen is soft.      Tenderness: There is no abdominal tenderness. There is no guarding or rebound.   Musculoskeletal:         General: No tenderness or edema. Normal range of motion.      Cervical back: Normal range of motion and neck supple.   Lymphadenopathy:      Cervical: No cervical adenopathy.   Skin:     General: Skin is warm and dry.      Coloration: Skin is not pale.      Findings: No erythema or rash.   Neurological:      Mental Status: He is alert and oriented to person, place, and time.   Psychiatric:         Mood and Affect: Mood and affect normal.         Behavior: Behavior normal.         Thought Content: Thought content normal.         Judgment: Judgment normal.         CMP  Sodium   Date Value Ref Range Status   01/21/2021 140 136 - 145 mmol/L Final     Potassium   Date Value Ref Range Status   01/21/2021 4.3 3.5 - 5.1 mmol/L Final     Chloride   Date Value Ref Range Status   01/21/2021 107 95 - 110 mmol/L Final     CO2   Date Value Ref Range Status   01/21/2021 26 23 - 29 mmol/L Final     Glucose   Date Value Ref Range Status   01/21/2021 90 70 - 110 mg/dL Final     BUN   Date Value Ref Range Status   01/21/2021 18 6 - 20 mg/dL Final     Creatinine   Date Value Ref Range Status   01/21/2021 1.3 0.5 - 1.4 mg/dL Final     Calcium   Date Value Ref Range Status   01/21/2021 8.4 (L) 8.7 - 10.5 mg/dL Final     Total Protein   Date Value Ref Range Status   02/18/2021 6.9 6.0 - 8.4 g/dL Final     Albumin   Date Value Ref Range Status   02/18/2021 4.0 3.5 - 5.2 g/dL Final      Total Bilirubin   Date Value Ref Range Status   02/18/2021 0.9 0.1 - 1.0 mg/dL Final     Comment:     For infants and newborns, interpretation of results should be based  on gestational age, weight and in agreement with clinical  observations.    Premature Infant recommended reference ranges:  Up to 24 hours.............<8.0 mg/dL  Up to 48 hours............<12.0 mg/dL  3-5 days..................<15.0 mg/dL  6-29 days.................<15.0 mg/dL       Alkaline Phosphatase   Date Value Ref Range Status   02/18/2021 65 55 - 135 U/L Final     AST   Date Value Ref Range Status   02/18/2021 22 10 - 40 U/L Final     ALT   Date Value Ref Range Status   02/18/2021 23 10 - 44 U/L Final     Anion Gap   Date Value Ref Range Status   01/21/2021 7 (L) 8 - 16 mmol/L Final     eGFR if    Date Value Ref Range Status   01/21/2021 >60 >60 mL/min/1.73 m^2 Final     eGFR if non    Date Value Ref Range Status   01/21/2021 >60 >60 mL/min/1.73 m^2 Final     Comment:     Calculation used to obtain the estimated glomerular filtration  rate (eGFR) is the CKD-EPI equation.        Lab Results   Component Value Date    WBC 3.87 (L) 01/21/2021    HGB 13.4 (L) 01/21/2021    HCT 41.9 01/21/2021    MCV 95 01/21/2021     01/21/2021     Lab Results   Component Value Date    CHOL 185 01/07/2021     Lab Results   Component Value Date    HDL 47 01/07/2021     Lab Results   Component Value Date    LDLCALC 118.4 01/07/2021     Lab Results   Component Value Date    TRIG 98 01/07/2021     Lab Results   Component Value Date    CHOLHDL 25.4 01/07/2021     Lab Results   Component Value Date    TSH 0.827 01/07/2021     Lab Results   Component Value Date    HGBA1C 5.2 01/07/2021     Assessment:       1. Routine adult health maintenance        Plan:   Routine adult health maintenance  -     Comprehensive Metabolic Panel; Future; Expected date: 01/07/2022  -     CBC Auto Differential; Future; Expected date: 01/07/2022  -      Lipid Panel; Future; Expected date: 01/07/2022  -     TSH; Future; Expected date: 01/07/2022  -     PSA, Screening; Future; Expected date: 01/07/2022  -     Hemoglobin A1C; Future; Expected date: 01/07/2022  -     HIV 1/2 Ag/Ab (4th Gen); Future; Expected date: 01/07/2022  -     RPR; Future; Expected date: 01/07/2022    Stable--------watch diet,exercise----------as above-------------f/u 1 year----------

## 2022-01-08 ENCOUNTER — TELEPHONE (OUTPATIENT)
Dept: INTERNAL MEDICINE | Facility: CLINIC | Age: 42
End: 2022-01-08
Payer: COMMERCIAL

## 2022-01-08 LAB — RPR SER QL: NORMAL

## 2022-01-10 LAB — HIV 1+2 AB+HIV1 P24 AG SERPL QL IA: NEGATIVE

## 2022-02-22 ENCOUNTER — TELEPHONE (OUTPATIENT)
Dept: UROLOGY | Facility: CLINIC | Age: 42
End: 2022-02-22

## 2022-02-22 NOTE — TELEPHONE ENCOUNTER
Tried calling pt message said not available. Was going to see if he would like to see Taya Lopez     ----- Message from Souleymane Hutchinson sent at 2/22/2022  9:11 AM CST -----  Contact: Sukumar  .Type:  Sooner Apoointment Request    Caller is requesting a sooner appointment.  Caller declined first available appointment listed below.  Caller will not accept being placed on the waitlist and is requesting a message be sent to doctor.  Name of Caller: Sukumar   When is the first available appointment? 4/7/22  Symptoms: Pain near vasectomy area (1 year post op)  Would the patient rather a call back or a response via [facility]? Call   Best Call Back Number: [phone]  Additional Information: requests to be seen this week, sooner than next available.  Thanks,  RP

## 2022-03-23 PROBLEM — R29.898 SHOULDER WEAKNESS: Status: RESOLVED | Noted: 2021-04-16 | Resolved: 2022-03-23

## 2022-03-23 PROBLEM — N48.1 BALANITIS: Status: RESOLVED | Noted: 2020-07-29 | Resolved: 2022-03-23

## 2022-03-23 PROBLEM — N45.3 EPIDIDYMOORCHITIS: Status: RESOLVED | Noted: 2020-09-25 | Resolved: 2022-03-23
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
## 2022-03-28 ENCOUNTER — HOSPITAL ENCOUNTER (OUTPATIENT)
Dept: RADIOLOGY | Facility: HOSPITAL | Age: 42
Discharge: HOME | End: 2022-03-28
Attending: REGISTERED NURSE
Payer: COMMERCIAL

## 2022-03-28 ENCOUNTER — OFFICE VISIT (OUTPATIENT)
Dept: FAMILY MEDICINE | Facility: CLINIC | Age: 42
End: 2022-03-28
Payer: COMMERCIAL

## 2022-03-28 VITALS
DIASTOLIC BLOOD PRESSURE: 74 MMHG | BODY MASS INDEX: 30.05 KG/M2 | HEART RATE: 72 BPM | OXYGEN SATURATION: 99 % | WEIGHT: 198.31 LBS | TEMPERATURE: 98 F | SYSTOLIC BLOOD PRESSURE: 102 MMHG | HEIGHT: 68 IN
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
## 2022-03-28 DIAGNOSIS — M54.50 MIDLINE LOW BACK PAIN WITHOUT SCIATICA, UNSPECIFIED CHRONICITY: ICD-10-CM
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
## 2022-03-28 DIAGNOSIS — M25.512 LEFT SHOULDER PAIN, UNSPECIFIED CHRONICITY: ICD-10-CM

## 2022-03-28 PROCEDURE — 72100 X-RAY EXAM L-S SPINE 2/3 VWS: CPT | Mod: 26,,, | Performed by: RADIOLOGY

## 2022-03-28 PROCEDURE — 72100 XR LUMBAR SPINE AP AND LATERAL: ICD-10-PCS | Mod: 26,,, | Performed by: RADIOLOGY

## 2022-03-28 PROCEDURE — 73030 X-RAY EXAM OF SHOULDER: CPT | Mod: TC,FY,PO,LT

## 2022-03-28 PROCEDURE — 73030 XR SHOULDER COMPLETE 2 OR MORE VIEWS LEFT: ICD-10-PCS | Mod: 26,LT,, | Performed by: RADIOLOGY

## 2022-03-28 PROCEDURE — 3074F SYST BP LT 130 MM HG: CPT | Mod: CPTII,S$GLB,, | Performed by: REGISTERED NURSE

## 2022-03-28 PROCEDURE — 1159F PR MEDICATION LIST DOCUMENTED IN MEDICAL RECORD: ICD-10-PCS | Mod: CPTII,S$GLB,, | Performed by: REGISTERED NURSE

## 2022-03-28 PROCEDURE — 3044F PR MOST RECENT HEMOGLOBIN A1C LEVEL <7.0%: ICD-10-PCS | Mod: CPTII,S$GLB,, | Performed by: REGISTERED NURSE

## 2022-03-28 PROCEDURE — 99999 PR PBB SHADOW E&M-EST. PATIENT-LVL III: ICD-10-PCS | Mod: PBBFAC,,, | Performed by: REGISTERED NURSE

## 2022-03-28 PROCEDURE — 3078F PR MOST RECENT DIASTOLIC BLOOD PRESSURE < 80 MM HG: ICD-10-PCS | Mod: CPTII,S$GLB,, | Performed by: REGISTERED NURSE

## 2022-03-28 PROCEDURE — 3074F PR MOST RECENT SYSTOLIC BLOOD PRESSURE < 130 MM HG: ICD-10-PCS | Mod: CPTII,S$GLB,, | Performed by: REGISTERED NURSE

## 2022-03-28 PROCEDURE — 3008F PR BODY MASS INDEX (BMI) DOCUMENTED: ICD-10-PCS | Mod: CPTII,S$GLB,, | Performed by: REGISTERED NURSE

## 2022-03-28 PROCEDURE — 72100 X-RAY EXAM L-S SPINE 2/3 VWS: CPT | Mod: TC,FY,PO

## 2022-03-28 PROCEDURE — 3078F DIAST BP <80 MM HG: CPT | Mod: CPTII,S$GLB,, | Performed by: REGISTERED NURSE

## 2022-03-28 PROCEDURE — 3008F BODY MASS INDEX DOCD: CPT | Mod: CPTII,S$GLB,, | Performed by: REGISTERED NURSE

## 2022-03-28 PROCEDURE — 99213 PR OFFICE/OUTPT VISIT, EST, LEVL III, 20-29 MIN: ICD-10-PCS | Mod: S$GLB,,, | Performed by: REGISTERED NURSE

## 2022-03-28 PROCEDURE — 99999 PR PBB SHADOW E&M-EST. PATIENT-LVL III: CPT | Mod: PBBFAC,,, | Performed by: REGISTERED NURSE

## 2022-03-28 PROCEDURE — 1159F MED LIST DOCD IN RCRD: CPT | Mod: CPTII,S$GLB,, | Performed by: REGISTERED NURSE

## 2022-03-28 PROCEDURE — 3044F HG A1C LEVEL LT 7.0%: CPT | Mod: CPTII,S$GLB,, | Performed by: REGISTERED NURSE

## 2022-03-28 PROCEDURE — 73030 X-RAY EXAM OF SHOULDER: CPT | Mod: 26,LT,, | Performed by: RADIOLOGY

## 2022-03-28 PROCEDURE — 99213 OFFICE O/P EST LOW 20 MIN: CPT | Mod: S$GLB,,, | Performed by: REGISTERED NURSE

## 2022-03-28 RX ORDER — LIDOCAINE 50 MG/G
1 PATCH TOPICAL DAILY PRN
Qty: 30 PATCH | Refills: 1 | Status: SHIPPED | OUTPATIENT
Start: 2022-03-28 | End: 2022-05-04

## 2022-03-28 RX ORDER — PREDNISONE 50 MG/1
50 TABLET ORAL DAILY
Qty: 5 TABLET | Refills: 0 | Status: SHIPPED | OUTPATIENT
Start: 2022-03-28 | End: 2022-04-02
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
## 2022-04-14 ENCOUNTER — TELEPHONE (OUTPATIENT)
Dept: FAMILY MEDICINE | Facility: CLINIC | Age: 42
End: 2022-04-14
Payer: COMMERCIAL

## 2022-04-14 ENCOUNTER — TELEPHONE (OUTPATIENT)
Dept: UROLOGY | Facility: CLINIC | Age: 42
End: 2022-04-14
Payer: COMMERCIAL

## 2022-04-14 RX ORDER — CEFDINIR 300 MG/1
300 CAPSULE ORAL 2 TIMES DAILY
Qty: 20 CAPSULE | Refills: 0 | Status: SHIPPED | OUTPATIENT
Start: 2022-04-14 | End: 2022-04-24

## 2022-04-14 NOTE — TELEPHONE ENCOUNTER
Pt called office regarding he's having pain in the vasectomy spot again and he's requesting cefdinir to be send to his pharmacy [facility]  In pt chart. Pt states this helps him and this pain comes and goes sometimes but this really help him. Pt has been  aware I will contact him if you decide to send it

## 2022-04-14 NOTE — TELEPHONE ENCOUNTER
Called and spoke to pt, informed pt that a script for Cefdinir has been sent to the pharmacy, and he would like to have come in next week to see Taya. Pt scheduled for 4/19/2022 at 9:40am at [facility].

## 2022-04-14 NOTE — TELEPHONE ENCOUNTER
----- Message from Abraham Reaves MD sent at 4/14/2022  4:05 PM CDT -----  Please see ike next week

## 2022-04-14 NOTE — TELEPHONE ENCOUNTER
----- Message from Annie Camarillo sent at 4/14/2022  4:14 PM CDT -----  .Type:  Sooner Apoointment Request    Caller is requesting a sooner appointment.  Caller declined first available appointment listed below.  Caller will not accept being placed on the waitlist and is requesting a message be sent to doctor.  Name of Caller:KAUR COLVIN [[id number]]  When is the first available appointment? 06/2022  Symptoms: follow up   Would the patient rather a call back or a response via [facility]?   [facility] Call Back Number: [phone]  Additional Information:  pt is asking for an appt around the 1st part of May. Please call

## 2022-05-04 ENCOUNTER — OFFICE VISIT (OUTPATIENT)
Dept: FAMILY MEDICINE | Facility: CLINIC | Age: 42
End: 2022-05-04
Payer: COMMERCIAL

## 2022-05-04 VITALS
HEART RATE: 75 BPM | DIASTOLIC BLOOD PRESSURE: 70 MMHG | RESPIRATION RATE: 16 BRPM | TEMPERATURE: 98 F | SYSTOLIC BLOOD PRESSURE: 110 MMHG | WEIGHT: 194.44 LBS | BODY MASS INDEX: 29.57 KG/M2

## 2022-05-04 DIAGNOSIS — Z00.00 ROUTINE ADULT HEALTH MAINTENANCE: ICD-10-CM

## 2022-05-04 DIAGNOSIS — R79.89 LOW TESTOSTERONE: Primary | ICD-10-CM

## 2022-05-04 PROCEDURE — 96372 PR INJECTION,THERAP/PROPH/DIAG2ST, IM OR SUBCUT: ICD-10-PCS | Mod: S$GLB,,, | Performed by: INTERNAL MEDICINE

## 2022-05-04 PROCEDURE — 3044F HG A1C LEVEL LT 7.0%: CPT | Mod: CPTII,S$GLB,, | Performed by: INTERNAL MEDICINE

## 2022-05-04 PROCEDURE — 96372 THER/PROPH/DIAG INJ SC/IM: CPT | Mod: S$GLB,,, | Performed by: INTERNAL MEDICINE

## 2022-05-04 PROCEDURE — 3078F PR MOST RECENT DIASTOLIC BLOOD PRESSURE < 80 MM HG: ICD-10-PCS | Mod: CPTII,S$GLB,, | Performed by: INTERNAL MEDICINE

## 2022-05-04 PROCEDURE — 3074F SYST BP LT 130 MM HG: CPT | Mod: CPTII,S$GLB,, | Performed by: INTERNAL MEDICINE

## 2022-05-04 PROCEDURE — 99999 PR PBB SHADOW E&M-EST. PATIENT-LVL III: CPT | Mod: PBBFAC,,, | Performed by: INTERNAL MEDICINE

## 2022-05-04 PROCEDURE — 99396 PREV VISIT EST AGE 40-64: CPT | Mod: 25,S$GLB,, | Performed by: INTERNAL MEDICINE

## 2022-05-04 PROCEDURE — 1159F MED LIST DOCD IN RCRD: CPT | Mod: CPTII,S$GLB,, | Performed by: INTERNAL MEDICINE

## 2022-05-04 PROCEDURE — 3078F DIAST BP <80 MM HG: CPT | Mod: CPTII,S$GLB,, | Performed by: INTERNAL MEDICINE

## 2022-05-04 PROCEDURE — 3074F PR MOST RECENT SYSTOLIC BLOOD PRESSURE < 130 MM HG: ICD-10-PCS | Mod: CPTII,S$GLB,, | Performed by: INTERNAL MEDICINE

## 2022-05-04 PROCEDURE — 3008F PR BODY MASS INDEX (BMI) DOCUMENTED: ICD-10-PCS | Mod: CPTII,S$GLB,, | Performed by: INTERNAL MEDICINE

## 2022-05-04 PROCEDURE — 3044F PR MOST RECENT HEMOGLOBIN A1C LEVEL <7.0%: ICD-10-PCS | Mod: CPTII,S$GLB,, | Performed by: INTERNAL MEDICINE

## 2022-05-04 PROCEDURE — 1159F PR MEDICATION LIST DOCUMENTED IN MEDICAL RECORD: ICD-10-PCS | Mod: CPTII,S$GLB,, | Performed by: INTERNAL MEDICINE

## 2022-05-04 PROCEDURE — 99396 PR PREVENTIVE VISIT,EST,40-64: ICD-10-PCS | Mod: 25,S$GLB,, | Performed by: INTERNAL MEDICINE

## 2022-05-04 PROCEDURE — 99999 PR PBB SHADOW E&M-EST. PATIENT-LVL III: ICD-10-PCS | Mod: PBBFAC,,, | Performed by: INTERNAL MEDICINE

## 2022-05-04 PROCEDURE — 3008F BODY MASS INDEX DOCD: CPT | Mod: CPTII,S$GLB,, | Performed by: INTERNAL MEDICINE

## 2022-05-04 RX ORDER — TESTOSTERONE CYPIONATE 200 MG/ML
200 INJECTION, SOLUTION INTRAMUSCULAR
Status: ACTIVE | OUTPATIENT
Start: 2022-05-04 | End: 2022-10-19

## 2022-05-04 RX ORDER — OXYCODONE AND ACETAMINOPHEN 10; 325 MG/1; MG/1
1 TABLET ORAL 2 TIMES DAILY
COMMUNITY
Start: 2022-04-08 | End: 2022-05-04

## 2022-05-04 RX ADMIN — TESTOSTERONE CYPIONATE 200 MG: 200 INJECTION, SOLUTION INTRAMUSCULAR at 02:05
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
## 2022-05-07 RX ORDER — VALACYCLOVIR HYDROCHLORIDE 1 G/1
TABLET, FILM COATED ORAL
Qty: 60 TABLET | Refills: 1 | OUTPATIENT
Start: 2022-05-07

## 2022-05-07 NOTE — TELEPHONE ENCOUNTER
No new care gaps identified.  [facility] Embedded Care Gaps. Reference number: [id number]. 5/07/2022   7:31:09 AM KENROYT

## 2022-05-23 ENCOUNTER — TELEPHONE (OUTPATIENT)
Dept: FAMILY MEDICINE | Facility: CLINIC | Age: 42
End: 2022-05-23
Payer: COMMERCIAL
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
## 2022-06-02 ENCOUNTER — HOSPITAL ENCOUNTER (OUTPATIENT)
Dept: RADIOLOGY | Facility: HOSPITAL | Age: 42
Discharge: HOME | End: 2022-06-02
Attending: FAMILY MEDICINE
Payer: COMMERCIAL

## 2022-06-02 ENCOUNTER — OFFICE VISIT (OUTPATIENT)
Dept: FAMILY MEDICINE | Facility: CLINIC | Age: 42
End: 2022-06-02
Payer: COMMERCIAL

## 2022-06-02 VITALS
BODY MASS INDEX: 29.77 KG/M2 | DIASTOLIC BLOOD PRESSURE: 75 MMHG | TEMPERATURE: 97 F | WEIGHT: 196.44 LBS | SYSTOLIC BLOOD PRESSURE: 123 MMHG | HEIGHT: 68 IN | HEART RATE: 74 BPM | OXYGEN SATURATION: 96 %

## 2022-06-02 DIAGNOSIS — S69.91XA INJURY OF RIGHT HAND, INITIAL ENCOUNTER: ICD-10-CM

## 2022-06-02 DIAGNOSIS — R79.89 LOW TESTOSTERONE: ICD-10-CM

## 2022-06-02 DIAGNOSIS — S62.309A CLOSED FRACTURE OF MULTIPLE METACARPAL BONES, INITIAL ENCOUNTER: ICD-10-CM

## 2022-06-02 DIAGNOSIS — J01.90 ACUTE RHINOSINUSITIS: Primary | ICD-10-CM

## 2022-06-02 PROCEDURE — 1160F RVW MEDS BY RX/DR IN RCRD: CPT | Mod: CPTII,S$GLB,, | Performed by: FAMILY MEDICINE

## 2022-06-02 PROCEDURE — 3008F BODY MASS INDEX DOCD: CPT | Mod: CPTII,S$GLB,, | Performed by: FAMILY MEDICINE

## 2022-06-02 PROCEDURE — 3044F HG A1C LEVEL LT 7.0%: CPT | Mod: CPTII,S$GLB,, | Performed by: FAMILY MEDICINE

## 2022-06-02 PROCEDURE — 73130 XR HAND COMPLETE 3 VIEW RIGHT: ICD-10-PCS | Mod: 26,RT,, | Performed by: RADIOLOGY

## 2022-06-02 PROCEDURE — 3074F SYST BP LT 130 MM HG: CPT | Mod: CPTII,S$GLB,, | Performed by: FAMILY MEDICINE

## 2022-06-02 PROCEDURE — 99214 PR OFFICE/OUTPT VISIT, EST, LEVL IV, 30-39 MIN: ICD-10-PCS | Mod: 25,S$GLB,, | Performed by: FAMILY MEDICINE

## 2022-06-02 PROCEDURE — 1159F PR MEDICATION LIST DOCUMENTED IN MEDICAL RECORD: ICD-10-PCS | Mod: CPTII,S$GLB,, | Performed by: FAMILY MEDICINE

## 2022-06-02 PROCEDURE — 3044F PR MOST RECENT HEMOGLOBIN A1C LEVEL <7.0%: ICD-10-PCS | Mod: CPTII,S$GLB,, | Performed by: FAMILY MEDICINE

## 2022-06-02 PROCEDURE — 96372 PR INJECTION,THERAP/PROPH/DIAG2ST, IM OR SUBCUT: ICD-10-PCS | Mod: S$GLB,,, | Performed by: FAMILY MEDICINE

## 2022-06-02 PROCEDURE — 3078F DIAST BP <80 MM HG: CPT | Mod: CPTII,S$GLB,, | Performed by: FAMILY MEDICINE

## 2022-06-02 PROCEDURE — 96372 THER/PROPH/DIAG INJ SC/IM: CPT | Mod: S$GLB,,, | Performed by: FAMILY MEDICINE

## 2022-06-02 PROCEDURE — 1160F PR REVIEW ALL MEDS BY PRESCRIBER/CLIN PHARMACIST DOCUMENTED: ICD-10-PCS | Mod: CPTII,S$GLB,, | Performed by: FAMILY MEDICINE

## 2022-06-02 PROCEDURE — 3008F PR BODY MASS INDEX (BMI) DOCUMENTED: ICD-10-PCS | Mod: CPTII,S$GLB,, | Performed by: FAMILY MEDICINE

## 2022-06-02 PROCEDURE — 73130 X-RAY EXAM OF HAND: CPT | Mod: 26,RT,, | Performed by: RADIOLOGY

## 2022-06-02 PROCEDURE — 99999 PR PBB SHADOW E&M-EST. PATIENT-LVL IV: CPT | Mod: PBBFAC,,, | Performed by: FAMILY MEDICINE

## 2022-06-02 PROCEDURE — 99214 OFFICE O/P EST MOD 30 MIN: CPT | Mod: 25,S$GLB,, | Performed by: FAMILY MEDICINE

## 2022-06-02 PROCEDURE — 3078F PR MOST RECENT DIASTOLIC BLOOD PRESSURE < 80 MM HG: ICD-10-PCS | Mod: CPTII,S$GLB,, | Performed by: FAMILY MEDICINE

## 2022-06-02 PROCEDURE — 3074F PR MOST RECENT SYSTOLIC BLOOD PRESSURE < 130 MM HG: ICD-10-PCS | Mod: CPTII,S$GLB,, | Performed by: FAMILY MEDICINE

## 2022-06-02 PROCEDURE — 99999 PR PBB SHADOW E&M-EST. PATIENT-LVL IV: ICD-10-PCS | Mod: PBBFAC,,, | Performed by: FAMILY MEDICINE

## 2022-06-02 PROCEDURE — 1159F MED LIST DOCD IN RCRD: CPT | Mod: CPTII,S$GLB,, | Performed by: FAMILY MEDICINE

## 2022-06-02 PROCEDURE — 73130 X-RAY EXAM OF HAND: CPT | Mod: TC,FY,PO,RT

## 2022-06-02 RX ORDER — OXYCODONE AND ACETAMINOPHEN 10; 325 MG/1; MG/1
1 TABLET ORAL 2 TIMES DAILY
COMMUNITY
Start: 2022-05-06 | End: 2022-12-21

## 2022-06-02 RX ORDER — BETAMETHASONE SODIUM PHOSPHATE AND BETAMETHASONE ACETATE 3; 3 MG/ML; MG/ML
12 INJECTION, SUSPENSION INTRA-ARTICULAR; INTRALESIONAL; INTRAMUSCULAR; SOFT TISSUE
Status: COMPLETED | OUTPATIENT
Start: 2022-06-02 | End: 2022-06-02

## 2022-06-02 RX ADMIN — BETAMETHASONE SODIUM PHOSPHATE AND BETAMETHASONE ACETATE 12 MG: 3; 3 INJECTION, SUSPENSION INTRA-ARTICULAR; INTRALESIONAL; INTRAMUSCULAR; SOFT TISSUE at 02:06

## 2022-06-02 RX ADMIN — TESTOSTERONE CYPIONATE 200 MG: 200 INJECTION, SOLUTION INTRAMUSCULAR at 02:06
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
## 2022-06-14 ENCOUNTER — OFFICE VISIT (OUTPATIENT)
Dept: SPORTS MEDICINE | Facility: CLINIC | Age: 42
End: 2022-06-14
Payer: COMMERCIAL

## 2022-06-14 VITALS — BODY MASS INDEX: 30.1 KG/M2 | RESPIRATION RATE: 20 BRPM | HEIGHT: 68 IN | WEIGHT: 198.63 LBS

## 2022-06-14 DIAGNOSIS — S62.309A CLOSED FRACTURE OF MULTIPLE METACARPAL BONES, INITIAL ENCOUNTER: ICD-10-CM

## 2022-06-14 PROCEDURE — 3044F HG A1C LEVEL LT 7.0%: CPT | Mod: CPTII,S$GLB,, | Performed by: STUDENT IN AN ORGANIZED HEALTH CARE EDUCATION/TRAINING PROGRAM

## 2022-06-14 PROCEDURE — 99999 PR PBB SHADOW E&M-EST. PATIENT-LVL III: ICD-10-PCS | Mod: PBBFAC,,, | Performed by: STUDENT IN AN ORGANIZED HEALTH CARE EDUCATION/TRAINING PROGRAM

## 2022-06-14 PROCEDURE — 3008F PR BODY MASS INDEX (BMI) DOCUMENTED: ICD-10-PCS | Mod: CPTII,S$GLB,, | Performed by: STUDENT IN AN ORGANIZED HEALTH CARE EDUCATION/TRAINING PROGRAM

## 2022-06-14 PROCEDURE — 29125 APPL SHORT ARM SPLINT STATIC: CPT | Mod: S$GLB,,, | Performed by: STUDENT IN AN ORGANIZED HEALTH CARE EDUCATION/TRAINING PROGRAM

## 2022-06-14 PROCEDURE — 99999 PR PBB SHADOW E&M-EST. PATIENT-LVL III: CPT | Mod: PBBFAC,,, | Performed by: STUDENT IN AN ORGANIZED HEALTH CARE EDUCATION/TRAINING PROGRAM

## 2022-06-14 PROCEDURE — 99203 PR OFFICE/OUTPT VISIT, NEW, LEVL III, 30-44 MIN: ICD-10-PCS | Mod: 25,S$GLB,, | Performed by: STUDENT IN AN ORGANIZED HEALTH CARE EDUCATION/TRAINING PROGRAM

## 2022-06-14 PROCEDURE — 99203 OFFICE O/P NEW LOW 30 MIN: CPT | Mod: 25,S$GLB,, | Performed by: STUDENT IN AN ORGANIZED HEALTH CARE EDUCATION/TRAINING PROGRAM

## 2022-06-14 PROCEDURE — 3008F BODY MASS INDEX DOCD: CPT | Mod: CPTII,S$GLB,, | Performed by: STUDENT IN AN ORGANIZED HEALTH CARE EDUCATION/TRAINING PROGRAM

## 2022-06-14 PROCEDURE — 1159F PR MEDICATION LIST DOCUMENTED IN MEDICAL RECORD: ICD-10-PCS | Mod: CPTII,S$GLB,, | Performed by: STUDENT IN AN ORGANIZED HEALTH CARE EDUCATION/TRAINING PROGRAM

## 2022-06-14 PROCEDURE — 1159F MED LIST DOCD IN RCRD: CPT | Mod: CPTII,S$GLB,, | Performed by: STUDENT IN AN ORGANIZED HEALTH CARE EDUCATION/TRAINING PROGRAM

## 2022-06-14 PROCEDURE — 29125 PR APPLY FOREARM SPLINT,STATIC: ICD-10-PCS | Mod: S$GLB,,, | Performed by: STUDENT IN AN ORGANIZED HEALTH CARE EDUCATION/TRAINING PROGRAM

## 2022-06-14 PROCEDURE — 3044F PR MOST RECENT HEMOGLOBIN A1C LEVEL <7.0%: ICD-10-PCS | Mod: CPTII,S$GLB,, | Performed by: STUDENT IN AN ORGANIZED HEALTH CARE EDUCATION/TRAINING PROGRAM

## 2022-06-14 RX ORDER — CELECOXIB 200 MG/1
200 CAPSULE ORAL
COMMUNITY
End: 2023-01-12
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
## 2022-06-15 ENCOUNTER — OFFICE VISIT (OUTPATIENT)
Dept: ORTHOPEDICS | Facility: CLINIC | Age: 42
End: 2022-06-15
Payer: COMMERCIAL

## 2022-06-15 ENCOUNTER — HOSPITAL ENCOUNTER (OUTPATIENT)
Dept: RADIOLOGY | Facility: HOSPITAL | Age: 42
Discharge: HOME | End: 2022-06-15
Attending: ORTHOPAEDIC SURGERY
Payer: COMMERCIAL

## 2022-06-15 VITALS — HEIGHT: 68 IN | WEIGHT: 198.63 LBS | BODY MASS INDEX: 30.1 KG/M2

## 2022-06-15 DIAGNOSIS — S62.306A CLOSED FRACTURE OF FIFTH METACARPAL BONE OF RIGHT HAND, UNSPECIFIED FRACTURE MORPHOLOGY, INITIAL ENCOUNTER: Primary | ICD-10-CM

## 2022-06-15 DIAGNOSIS — M79.641 RIGHT HAND PAIN: ICD-10-CM

## 2022-06-15 DIAGNOSIS — S62.304A CLOSED NONDISPLACED FRACTURE OF FOURTH METACARPAL BONE OF RIGHT HAND, UNSPECIFIED PORTION OF METACARPAL, INITIAL ENCOUNTER: ICD-10-CM

## 2022-06-15 DIAGNOSIS — M79.641 RIGHT HAND PAIN: Primary | ICD-10-CM

## 2022-06-15 PROCEDURE — 73130 XR HAND COMPLETE 3 VIEW RIGHT: ICD-10-PCS | Mod: 26,RT,, | Performed by: RADIOLOGY

## 2022-06-15 PROCEDURE — 99999 PR PBB SHADOW E&M-EST. PATIENT-LVL III: ICD-10-PCS | Mod: PBBFAC,,, | Performed by: ORTHOPAEDIC SURGERY

## 2022-06-15 PROCEDURE — 3044F PR MOST RECENT HEMOGLOBIN A1C LEVEL <7.0%: ICD-10-PCS | Mod: CPTII,S$GLB,, | Performed by: ORTHOPAEDIC SURGERY

## 2022-06-15 PROCEDURE — 73130 X-RAY EXAM OF HAND: CPT | Mod: 26,RT,, | Performed by: RADIOLOGY

## 2022-06-15 PROCEDURE — 1160F PR REVIEW ALL MEDS BY PRESCRIBER/CLIN PHARMACIST DOCUMENTED: ICD-10-PCS | Mod: CPTII,S$GLB,, | Performed by: ORTHOPAEDIC SURGERY

## 2022-06-15 PROCEDURE — 3008F BODY MASS INDEX DOCD: CPT | Mod: CPTII,S$GLB,, | Performed by: ORTHOPAEDIC SURGERY

## 2022-06-15 PROCEDURE — 99203 OFFICE O/P NEW LOW 30 MIN: CPT | Mod: S$GLB,,, | Performed by: ORTHOPAEDIC SURGERY

## 2022-06-15 PROCEDURE — 3044F HG A1C LEVEL LT 7.0%: CPT | Mod: CPTII,S$GLB,, | Performed by: ORTHOPAEDIC SURGERY

## 2022-06-15 PROCEDURE — 73130 X-RAY EXAM OF HAND: CPT | Mod: TC,RT

## 2022-06-15 PROCEDURE — 99999 PR PBB SHADOW E&M-EST. PATIENT-LVL III: CPT | Mod: PBBFAC,,, | Performed by: ORTHOPAEDIC SURGERY

## 2022-06-15 PROCEDURE — 1159F PR MEDICATION LIST DOCUMENTED IN MEDICAL RECORD: ICD-10-PCS | Mod: CPTII,S$GLB,, | Performed by: ORTHOPAEDIC SURGERY

## 2022-06-15 PROCEDURE — 99203 PR OFFICE/OUTPT VISIT, NEW, LEVL III, 30-44 MIN: ICD-10-PCS | Mod: S$GLB,,, | Performed by: ORTHOPAEDIC SURGERY

## 2022-06-15 PROCEDURE — 3008F PR BODY MASS INDEX (BMI) DOCUMENTED: ICD-10-PCS | Mod: CPTII,S$GLB,, | Performed by: ORTHOPAEDIC SURGERY

## 2022-06-15 PROCEDURE — 1160F RVW MEDS BY RX/DR IN RCRD: CPT | Mod: CPTII,S$GLB,, | Performed by: ORTHOPAEDIC SURGERY

## 2022-06-15 PROCEDURE — 1159F MED LIST DOCD IN RCRD: CPT | Mod: CPTII,S$GLB,, | Performed by: ORTHOPAEDIC SURGERY
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
## 2022-07-13 ENCOUNTER — HOSPITAL ENCOUNTER (OUTPATIENT)
Dept: RADIOLOGY | Facility: HOSPITAL | Age: 42
Discharge: HOME | End: 2022-07-13
Attending: ORTHOPAEDIC SURGERY
Payer: COMMERCIAL

## 2022-07-13 ENCOUNTER — OFFICE VISIT (OUTPATIENT)
Dept: ORTHOPEDICS | Facility: CLINIC | Age: 42
End: 2022-07-13
Payer: COMMERCIAL

## 2022-07-13 VITALS — WEIGHT: 198 LBS | HEIGHT: 68 IN | BODY MASS INDEX: 30.01 KG/M2

## 2022-07-13 DIAGNOSIS — M79.641 RIGHT HAND PAIN: ICD-10-CM

## 2022-07-13 DIAGNOSIS — S62.306D CLOSED NONDISPLACED FRACTURE OF FIFTH METACARPAL BONE OF RIGHT HAND WITH ROUTINE HEALING, UNSPECIFIED PORTION OF METACARPAL, SUBSEQUENT ENCOUNTER: Primary | ICD-10-CM

## 2022-07-13 DIAGNOSIS — S62.304D CLOSED NONDISPLACED FRACTURE OF FOURTH METACARPAL BONE OF RIGHT HAND WITH ROUTINE HEALING, UNSPECIFIED PORTION OF METACARPAL, SUBSEQUENT ENCOUNTER: ICD-10-CM

## 2022-07-13 PROCEDURE — 3044F HG A1C LEVEL LT 7.0%: CPT | Mod: CPTII,S$GLB,, | Performed by: ORTHOPAEDIC SURGERY

## 2022-07-13 PROCEDURE — 3008F BODY MASS INDEX DOCD: CPT | Mod: CPTII,S$GLB,, | Performed by: ORTHOPAEDIC SURGERY

## 2022-07-13 PROCEDURE — 99999 PR PBB SHADOW E&M-EST. PATIENT-LVL III: ICD-10-PCS | Mod: PBBFAC,,, | Performed by: ORTHOPAEDIC SURGERY

## 2022-07-13 PROCEDURE — 99213 OFFICE O/P EST LOW 20 MIN: CPT | Mod: S$GLB,,, | Performed by: ORTHOPAEDIC SURGERY

## 2022-07-13 PROCEDURE — 99999 PR PBB SHADOW E&M-EST. PATIENT-LVL III: CPT | Mod: PBBFAC,,, | Performed by: ORTHOPAEDIC SURGERY

## 2022-07-13 PROCEDURE — 1160F RVW MEDS BY RX/DR IN RCRD: CPT | Mod: CPTII,S$GLB,, | Performed by: ORTHOPAEDIC SURGERY

## 2022-07-13 PROCEDURE — 1159F MED LIST DOCD IN RCRD: CPT | Mod: CPTII,S$GLB,, | Performed by: ORTHOPAEDIC SURGERY

## 2022-07-13 PROCEDURE — 99213 PR OFFICE/OUTPT VISIT, EST, LEVL III, 20-29 MIN: ICD-10-PCS | Mod: S$GLB,,, | Performed by: ORTHOPAEDIC SURGERY

## 2022-07-13 PROCEDURE — 73130 X-RAY EXAM OF HAND: CPT | Mod: 26,RT,, | Performed by: RADIOLOGY

## 2022-07-13 PROCEDURE — 73130 X-RAY EXAM OF HAND: CPT | Mod: TC,RT

## 2022-07-13 PROCEDURE — 1160F PR REVIEW ALL MEDS BY PRESCRIBER/CLIN PHARMACIST DOCUMENTED: ICD-10-PCS | Mod: CPTII,S$GLB,, | Performed by: ORTHOPAEDIC SURGERY

## 2022-07-13 PROCEDURE — 73130 XR HAND COMPLETE 3 VIEW RIGHT: ICD-10-PCS | Mod: 26,RT,, | Performed by: RADIOLOGY

## 2022-07-13 PROCEDURE — 3008F PR BODY MASS INDEX (BMI) DOCUMENTED: ICD-10-PCS | Mod: CPTII,S$GLB,, | Performed by: ORTHOPAEDIC SURGERY

## 2022-07-13 PROCEDURE — 3044F PR MOST RECENT HEMOGLOBIN A1C LEVEL <7.0%: ICD-10-PCS | Mod: CPTII,S$GLB,, | Performed by: ORTHOPAEDIC SURGERY

## 2022-07-13 PROCEDURE — 1159F PR MEDICATION LIST DOCUMENTED IN MEDICAL RECORD: ICD-10-PCS | Mod: CPTII,S$GLB,, | Performed by: ORTHOPAEDIC SURGERY
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
## 2022-08-16 ENCOUNTER — CLINICAL SUPPORT (OUTPATIENT)
Dept: FAMILY MEDICINE | Facility: CLINIC | Age: 42
End: 2022-08-16
Payer: COMMERCIAL

## 2022-08-16 PROCEDURE — 99999 PR PBB SHADOW E&M-EST. PATIENT-LVL II: ICD-10-PCS | Mod: PBBFAC,,,

## 2022-08-16 PROCEDURE — 99999 PR PBB SHADOW E&M-EST. PATIENT-LVL II: CPT | Mod: PBBFAC,,,

## 2022-08-16 PROCEDURE — 96372 PR INJECTION,THERAP/PROPH/DIAG2ST, IM OR SUBCUT: ICD-10-PCS | Mod: S$GLB,,, | Performed by: INTERNAL MEDICINE

## 2022-08-16 PROCEDURE — 96372 THER/PROPH/DIAG INJ SC/IM: CPT | Mod: S$GLB,,, | Performed by: INTERNAL MEDICINE

## 2022-08-16 RX ORDER — TESTOSTERONE CYPIONATE 200 MG/ML
200 INJECTION, SOLUTION INTRAMUSCULAR
Status: ACTIVE | OUTPATIENT
Start: 2022-08-16 | End: 2023-01-31

## 2022-08-16 RX ADMIN — TESTOSTERONE CYPIONATE 200 MG: 200 INJECTION, SOLUTION INTRAMUSCULAR at 11:08
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
## 2022-11-19 ENCOUNTER — HOSPITAL ENCOUNTER (EMERGENCY)
Facility: HOSPITAL | Age: 42
Discharge: HOME | End: 2022-11-19
Attending: EMERGENCY MEDICINE
Payer: COMMERCIAL

## 2022-11-19 VITALS
OXYGEN SATURATION: 97 % | RESPIRATION RATE: 16 BRPM | SYSTOLIC BLOOD PRESSURE: 139 MMHG | BODY MASS INDEX: 30.37 KG/M2 | DIASTOLIC BLOOD PRESSURE: 81 MMHG | TEMPERATURE: 99 F | HEART RATE: 69 BPM | WEIGHT: 199.75 LBS

## 2022-11-19 DIAGNOSIS — K13.70 MOUTH LESION: Primary | ICD-10-CM

## 2022-11-19 LAB
HCV AB SERPL QL IA: NEGATIVE
HEP C VIRUS HOLD SPECIMEN: NORMAL
HIV 1+2 AB+HIV1 P24 AG SERPL QL IA: NEGATIVE

## 2022-11-19 PROCEDURE — 87389 HIV-1 AG W/HIV-1&-2 AB AG IA: CPT | Performed by: EMERGENCY MEDICINE

## 2022-11-19 PROCEDURE — 87491 CHLMYD TRACH DNA AMP PROBE: CPT | Performed by: REGISTERED NURSE

## 2022-11-19 PROCEDURE — 86803 HEPATITIS C AB TEST: CPT | Performed by: EMERGENCY MEDICINE

## 2022-11-19 PROCEDURE — 87591 N.GONORRHOEAE DNA AMP PROB: CPT | Performed by: REGISTERED NURSE

## 2022-11-19 PROCEDURE — 99283 EMERGENCY DEPT VISIT LOW MDM: CPT

## 2022-11-19 NOTE — ED PROVIDER NOTES
Encounter Date: 11/19/2022       History     Chief Complaint   Patient presents with    Mouth Lesions     Noticed a sore on bottom gums x 3 days ago.     42-year-old male presents emergency department with complaints of sore to his lower gums.  Patient reports noticing sore proximally 2 days ago.  Patient also requesting STD testing.  Patient denies any fever, chills, penile discharge, dysuria, testicle pain or any other symptoms at this time.    The history is provided by the patient.   Review of patient's allergies indicates:  No Known Allergies  Past Medical History:   Diagnosis Date    Epididymoorchitis 9/25/2020    HSV (herpes simplex virus) infection     Mild chronic anemia      Past Surgical History:   Procedure Laterality Date    CIRCUMCISION N/A 8/13/2020    Procedure: CIRCUMCISION;  Surgeon: Abraham Reaves MD;  Location: [facility] OR;  Service: Urology;  Laterality: N/A;    none      VASECTOMY Bilateral 8/13/2020    Procedure: VASECTOMY;  Surgeon: Abraham Reaves MD;  Location: [facility] OR;  Service: Urology;  Laterality: Bilateral;     Family History   Problem Relation Age of Onset    No Known Problems Mother     No Known Problems Father     No Known Problems Sister     No Known Problems Brother     Hypertension Maternal Grandmother     Stroke Maternal Grandmother     No Known Problems Sister     No Known Problems Brother     No Known Problems Son     No Known Problems Son     Throat cancer Cousin     Diabetes Neg Hx     Heart disease Neg Hx      Social History     Tobacco Use    Smoking status: Never    Smokeless tobacco: Never   Substance Use Topics    Alcohol use: Yes     Comment: socially- no alcohol for 72 hours before surgery    Drug use: No     Review of Systems   Constitutional:  Negative for fever.   HENT:  Negative for sore throat.         +mouth sore   Respiratory:  Negative for shortness of breath.    Cardiovascular:  Negative for chest pain.   Gastrointestinal:  Negative for nausea.    Genitourinary:  Negative for dysuria.   Musculoskeletal:  Negative for back pain.   Skin:  Negative for rash.   Neurological:  Negative for weakness.   Hematological:  Does not bruise/bleed easily.   All other systems reviewed and are negative.    Physical Exam     Initial Vitals [11/19/22 1331]   BP Pulse Resp Temp SpO2   139/81 69 16 98.6 °F (37 °C) 97 %      MAP       --         Physical Exam    Constitutional: He appears well-developed and well-nourished. No distress.   HENT:   Head: Normocephalic and atraumatic.   Nose: Nose normal.   Mouth/Throat: Uvula is midline and oropharynx is clear and moist.   Small ulcer noted to lower gums   Eyes: Conjunctivae and EOM are normal. Pupils are equal, round, and reactive to light.   Neck: Neck supple.   Normal range of motion.  Cardiovascular:  Normal rate and regular rhythm.           Pulmonary/Chest: Effort normal and breath sounds normal. No respiratory distress. He has no decreased breath sounds. He has no wheezes. He has no rales.   Abdominal: Abdomen is soft. Bowel sounds are normal. There is no abdominal tenderness.   Musculoskeletal:         General: Normal range of motion.      Cervical back: Normal range of motion and neck supple.     Neurological: He is alert and oriented to person, place, and time. He has normal strength. GCS eye subscore is 4. GCS verbal subscore is 5. GCS motor subscore is 6.   Skin: Skin is warm and dry. Capillary refill takes less than 2 seconds. No rash noted.   Psychiatric: He has a normal mood and affect. His speech is normal and behavior is normal.       ED Course   Procedures  Labs Reviewed   C. TRACHOMATIS/N. GONORRHOEAE BY AMP DNA   HEP C VIRUS HOLD SPECIMEN    Narrative:     Release to patient->Immediate   HIV 1 / 2 ANTIBODY   HEPATITIS C ANTIBODY          Imaging Results    None          Medications - No data to display                           Clinical Impression:   Final diagnoses:  [K13.70] Mouth lesion (Primary)      ED  Disposition Condition    Discharge Stable          ED Prescriptions    None       Follow-up Information       Follow up With Specialties Details Why Contact Info    Jet Jefferson MD Internal Medicine In 1 week  [address] [facility] [address]  [phone]               Hung Foote Jr., FNP  11/19/22 4798

## 2022-11-20 LAB
C TRACH DNA SPEC QL NAA+PROBE: NOT DETECTED
N GONORRHOEA DNA SPEC QL NAA+PROBE: NOT DETECTED

## 2022-12-21 ENCOUNTER — OFFICE VISIT (OUTPATIENT)
Dept: UROLOGY | Facility: CLINIC | Age: 42
End: 2022-12-21
Payer: COMMERCIAL

## 2022-12-21 VITALS — WEIGHT: 199 LBS | HEIGHT: 68 IN | BODY MASS INDEX: 30.16 KG/M2

## 2022-12-21 DIAGNOSIS — N45.3 EPIDIDYMOORCHITIS: Primary | ICD-10-CM

## 2022-12-21 DIAGNOSIS — N48.1 BALANITIS: ICD-10-CM

## 2022-12-21 PROCEDURE — 3008F BODY MASS INDEX DOCD: CPT | Mod: CPTII,S$GLB,, | Performed by: UROLOGY

## 2022-12-21 PROCEDURE — 99999 PR PBB SHADOW E&M-EST. PATIENT-LVL III: ICD-10-PCS | Mod: PBBFAC,,, | Performed by: UROLOGY

## 2022-12-21 PROCEDURE — 99214 OFFICE O/P EST MOD 30 MIN: CPT | Mod: S$GLB,,, | Performed by: UROLOGY

## 2022-12-21 PROCEDURE — 1159F PR MEDICATION LIST DOCUMENTED IN MEDICAL RECORD: ICD-10-PCS | Mod: CPTII,S$GLB,, | Performed by: UROLOGY

## 2022-12-21 PROCEDURE — 3044F PR MOST RECENT HEMOGLOBIN A1C LEVEL <7.0%: ICD-10-PCS | Mod: CPTII,S$GLB,, | Performed by: UROLOGY

## 2022-12-21 PROCEDURE — 99999 PR PBB SHADOW E&M-EST. PATIENT-LVL III: CPT | Mod: PBBFAC,,, | Performed by: UROLOGY

## 2022-12-21 PROCEDURE — 3008F PR BODY MASS INDEX (BMI) DOCUMENTED: ICD-10-PCS | Mod: CPTII,S$GLB,, | Performed by: UROLOGY

## 2022-12-21 PROCEDURE — 3044F HG A1C LEVEL LT 7.0%: CPT | Mod: CPTII,S$GLB,, | Performed by: UROLOGY

## 2022-12-21 PROCEDURE — 99214 PR OFFICE/OUTPT VISIT, EST, LEVL IV, 30-39 MIN: ICD-10-PCS | Mod: S$GLB,,, | Performed by: UROLOGY

## 2022-12-21 PROCEDURE — 1159F MED LIST DOCD IN RCRD: CPT | Mod: CPTII,S$GLB,, | Performed by: UROLOGY

## 2022-12-21 RX ORDER — LEVOFLOXACIN 500 MG/1
500 TABLET, FILM COATED ORAL DAILY
Qty: 21 TABLET | Refills: 0 | Status: SHIPPED | OUTPATIENT
Start: 2022-12-21 | End: 2023-01-11

## 2022-12-21 RX ORDER — OXYCODONE AND ACETAMINOPHEN 5; 325 MG/1; MG/1
1 TABLET ORAL EVERY 4 HOURS PRN
Qty: 15 TABLET | Refills: 0 | Status: SHIPPED | OUTPATIENT
Start: 2022-12-21 | End: 2023-01-12

## 2022-12-21 RX ORDER — IBUPROFEN 800 MG/1
800 TABLET ORAL EVERY 8 HOURS
Qty: 15 TABLET | Refills: 0 | Status: SHIPPED | OUTPATIENT
Start: 2022-12-21 | End: 2023-01-12

## 2022-12-21 NOTE — PROGRESS NOTES
Chief Complaint:   Encounter Diagnoses   Name Primary?    Epididymoorchitis Yes    Balanitis        HPI:    12/21/22- persistent intermittent recurrence of infections, but states that he is not take all the medications just until it goes away.  Now with acute epididymo-orchitis.  40-year-old gentleman who reports with recurrent balanitis, having to use Lotrisone cream on multiple ocurrences now.  Patient states is also having some pain and discomfort peeling back the skin, possibly an early phimosis.  He is having no voiding complaints, no gross hematuria.  Patient has had no other urological history per his report, except for recent rise in creatinine which appears to be stabilized.  No family history of urological cancers or stones.  Patient also see today's discuss possible vasectomy.  He has 3 children, he is certain that he desires no more. He is aware of other forms of contraception. He is aware that vasectomy is to be considered permanent/irreversible.  This is a consultation from Dr. Jet Jefferson.    PATIENT HISTORY:    Past Medical History:   Diagnosis Date    Epididymoorchitis 9/25/2020    HSV (herpes simplex virus) infection     Mild chronic anemia        Past Surgical History:   Procedure Laterality Date    CIRCUMCISION N/A 8/13/2020    Procedure: CIRCUMCISION;  Surgeon: Abraham Reaves MD;  Location: [facility] OR;  Service: Urology;  Laterality: N/A;    none      VASECTOMY Bilateral 8/13/2020    Procedure: VASECTOMY;  Surgeon: Abraham Reaves MD;  Location: [facility] OR;  Service: Urology;  Laterality: Bilateral;       Family History   Problem Relation Age of Onset    No Known Problems Mother     No Known Problems Father     No Known Problems Sister     No Known Problems Brother     Hypertension Maternal Grandmother     Stroke Maternal Grandmother     No Known Problems Sister     No Known Problems Brother     No Known Problems Son     No Known Problems Son     Throat cancer Cousin     Diabetes  Neg Hx     Heart disease Neg Hx        Social History     Socioeconomic History    Marital status:     Number of children: 2   Occupational History    Occupation:      Comment: [employer]   Tobacco Use    Smoking status: Never    Smokeless tobacco: Never   Substance and Sexual Activity    Alcohol use: Yes     Comment: socially- no alcohol for 72 hours before surgery    Drug use: No    Sexual activity: Yes     Partners: Female   Social History Narrative    He wears seatbelt. He is expecting a daughter soon.       Allergies:  Patient has no known allergies.    Medications:  has a current medication list which includes the following prescription(s): celecoxib, oxycodone-acetaminophen, and valacyclovir, and the following Facility-Administered Medications: testosterone cypionate.    Review of Systems:  General: No fever, chills, fatigability, or weight loss.  Skin: No rashes, itching, or changes in color or texture of skin.  Chest: Denies BECKMAN, cyanosis, wheezing, cough, and sputum production.  Abdomen: Appetite fine. No weight loss. Denies diarrhea, abdominal pain, hematemesis, or blood in stool.  Musculoskeletal: No joint stiffness or swelling. Denies back pain.  : As above.  All other review of systems negative.      Physical Exam:  There were no vitals filed for this visit.  General: A&Ox3, no apparent distress, no deformities  Neck: No masses, normal ROM  Lungs: normal inspiration, no use of accessory muscles  Heart: normal pulse, no arrhythmias  Abdomen: Soft, NT, ND, no masses, no hernias, no hepatosplenomegaly  Skin: The skin is warm and dry. No jaundice.  Ext: No c/c/e.  : 12/22- Test desc dominic, firm right testicle and epididymitis. Normal penile and scrotal skin. Meatus normal.    Labs/Studies:   PSA 0.4 1/22  MICHELE stable, bilateral hydroceles 10/20  Semen analysis number two is clear, no evidence of sperm 11/13/2020  Semen analysis number one is clear, no evidence of sperm  10/30/2020    Impression/Plan:       1. Balanitis-  circumcision/vasectomy 8/13/20    No evidence of recurrent balanitis.    2. epididymoorchitis- definite recurrence, but patient has not been taking the antibiotics for the whole course, just until symptoms resolve after a few days.  Therefore will change antibiotics to Levaquin daily for 21 days, will take probiotics and yogurt to reduce the risk of colitis.  Ibuprofen 800 mg q.8 hours for 5 days as well, he has no gastric or renal issues.  See me back in 6 weeks if this does not improve then we will repeat the scrotal ultrasound.  Call with any complaints in the meantime.

## 2023-01-11 PROBLEM — M25.512 CHRONIC LEFT SHOULDER PAIN: Status: RESOLVED | Noted: 2021-03-25 | Resolved: 2023-01-11

## 2023-01-11 PROBLEM — N45.3 EPIDIDYMOORCHITIS: Status: RESOLVED | Noted: 2020-09-25 | Resolved: 2023-01-11

## 2023-01-11 PROBLEM — G89.29 CHRONIC LEFT SHOULDER PAIN: Status: RESOLVED | Noted: 2021-03-25 | Resolved: 2023-01-11

## 2023-01-11 PROBLEM — N48.1 BALANITIS: Status: RESOLVED | Noted: 2020-07-29 | Resolved: 2023-01-11

## 2023-01-12 ENCOUNTER — LAB VISIT (OUTPATIENT)
Dept: LAB | Facility: HOSPITAL | Age: 43
End: 2023-01-12
Attending: REGISTERED NURSE
Payer: COMMERCIAL

## 2023-01-12 ENCOUNTER — OFFICE VISIT (OUTPATIENT)
Dept: FAMILY MEDICINE | Facility: CLINIC | Age: 43
End: 2023-01-12
Payer: COMMERCIAL

## 2023-01-12 VITALS
BODY MASS INDEX: 31.52 KG/M2 | OXYGEN SATURATION: 98 % | HEART RATE: 74 BPM | SYSTOLIC BLOOD PRESSURE: 118 MMHG | DIASTOLIC BLOOD PRESSURE: 82 MMHG | HEIGHT: 68 IN | WEIGHT: 208 LBS | TEMPERATURE: 98 F

## 2023-01-12 DIAGNOSIS — E66.9 CLASS 1 OBESITY WITH SERIOUS COMORBIDITY AND BODY MASS INDEX (BMI) OF 31.0 TO 31.9 IN ADULT, UNSPECIFIED OBESITY TYPE: ICD-10-CM

## 2023-01-12 DIAGNOSIS — E78.00 HYPERCHOLESTEROLEMIA: ICD-10-CM

## 2023-01-12 DIAGNOSIS — E79.0 HYPERURICEMIA: ICD-10-CM

## 2023-01-12 DIAGNOSIS — R79.89 LOW TESTOSTERONE: ICD-10-CM

## 2023-01-12 DIAGNOSIS — Z00.00 PREVENTATIVE HEALTH CARE: Primary | ICD-10-CM

## 2023-01-12 DIAGNOSIS — Z00.00 PREVENTATIVE HEALTH CARE: ICD-10-CM

## 2023-01-12 PROBLEM — D50.9 IRON DEFICIENCY ANEMIA: Status: ACTIVE | Noted: 2023-01-12

## 2023-01-12 PROBLEM — E66.811 CLASS 1 OBESITY WITH SERIOUS COMORBIDITY AND BODY MASS INDEX (BMI) OF 31.0 TO 31.9 IN ADULT: Status: ACTIVE | Noted: 2023-01-12

## 2023-01-12 PROCEDURE — 84550 ASSAY OF BLOOD/URIC ACID: CPT | Performed by: REGISTERED NURSE

## 2023-01-12 PROCEDURE — 3079F DIAST BP 80-89 MM HG: CPT | Mod: CPTII,S$GLB,, | Performed by: REGISTERED NURSE

## 2023-01-12 PROCEDURE — 3008F BODY MASS INDEX DOCD: CPT | Mod: CPTII,S$GLB,, | Performed by: REGISTERED NURSE

## 2023-01-12 PROCEDURE — 99396 PR PREVENTIVE VISIT,EST,40-64: ICD-10-PCS | Mod: S$GLB,,, | Performed by: REGISTERED NURSE

## 2023-01-12 PROCEDURE — 3079F PR MOST RECENT DIASTOLIC BLOOD PRESSURE 80-89 MM HG: ICD-10-PCS | Mod: CPTII,S$GLB,, | Performed by: REGISTERED NURSE

## 2023-01-12 PROCEDURE — 1159F PR MEDICATION LIST DOCUMENTED IN MEDICAL RECORD: ICD-10-PCS | Mod: CPTII,S$GLB,, | Performed by: REGISTERED NURSE

## 2023-01-12 PROCEDURE — 99396 PREV VISIT EST AGE 40-64: CPT | Mod: S$GLB,,, | Performed by: REGISTERED NURSE

## 2023-01-12 PROCEDURE — 86592 SYPHILIS TEST NON-TREP QUAL: CPT | Performed by: REGISTERED NURSE

## 2023-01-12 PROCEDURE — 84153 ASSAY OF PSA TOTAL: CPT | Performed by: REGISTERED NURSE

## 2023-01-12 PROCEDURE — 80061 LIPID PANEL: CPT | Performed by: REGISTERED NURSE

## 2023-01-12 PROCEDURE — 3074F PR MOST RECENT SYSTOLIC BLOOD PRESSURE < 130 MM HG: ICD-10-PCS | Mod: CPTII,S$GLB,, | Performed by: REGISTERED NURSE

## 2023-01-12 PROCEDURE — 84403 ASSAY OF TOTAL TESTOSTERONE: CPT | Performed by: REGISTERED NURSE

## 2023-01-12 PROCEDURE — 84443 ASSAY THYROID STIM HORMONE: CPT | Performed by: REGISTERED NURSE

## 2023-01-12 PROCEDURE — 1159F MED LIST DOCD IN RCRD: CPT | Mod: CPTII,S$GLB,, | Performed by: REGISTERED NURSE

## 2023-01-12 PROCEDURE — 36415 COLL VENOUS BLD VENIPUNCTURE: CPT | Mod: PO | Performed by: REGISTERED NURSE

## 2023-01-12 PROCEDURE — 80053 COMPREHEN METABOLIC PANEL: CPT | Performed by: REGISTERED NURSE

## 2023-01-12 PROCEDURE — 99999 PR PBB SHADOW E&M-EST. PATIENT-LVL III: CPT | Mod: PBBFAC,,, | Performed by: REGISTERED NURSE

## 2023-01-12 PROCEDURE — 85025 COMPLETE CBC W/AUTO DIFF WBC: CPT | Performed by: REGISTERED NURSE

## 2023-01-12 PROCEDURE — 3008F PR BODY MASS INDEX (BMI) DOCUMENTED: ICD-10-PCS | Mod: CPTII,S$GLB,, | Performed by: REGISTERED NURSE

## 2023-01-12 PROCEDURE — 83036 HEMOGLOBIN GLYCOSYLATED A1C: CPT | Performed by: REGISTERED NURSE

## 2023-01-12 PROCEDURE — 99999 PR PBB SHADOW E&M-EST. PATIENT-LVL III: ICD-10-PCS | Mod: PBBFAC,,, | Performed by: REGISTERED NURSE

## 2023-01-12 PROCEDURE — 3074F SYST BP LT 130 MM HG: CPT | Mod: CPTII,S$GLB,, | Performed by: REGISTERED NURSE

## 2023-01-12 NOTE — PATIENT INSTRUCTIONS
"Modified Purine Diet for Gout    Purines are chemical products formed in the body following the digestion of some proteins. Some of these purines eventually become uric acid. The dietary treatment of gout is aimed at controlling the purine intake. Wile there are other sources of uric acid in the body than those ingested, it is advisable to avoid the high purine foods listed below:    Anchovies                              Gravy                    Mussels  Bullion                                    Heart                     quail  Brains                                    Mei                   jing  Broth                                      Kidney                   sardines  Consomme                            Liver                      scallops  Alcoholic beverages            Duck                      sweetbreads  Goose                                    Yeast                     mincemeat  Meat extracts                        Mackerel    The following factors are also important:  1. A large fluid intake is helpful in eliminating uric acid. Thus, this diet should include a minimum fluid intake (mostly water) of 2 quarts per day  2. Overweight should be avoided. However, in reducing weight, "crash diets" should not be used because of sudden weight loss causing increased uric acid levels.  3. Fermented beverages such as taqueria, beer or wine should be avoided.   4. Carbohydrate food (such as high fiber starches and vegetables) tend to increase the excretion of uric acid from the body and she be used in liberal amounts, but not to the extent to cause overweight. Fruit may be used in moderate amounts.   5. Moderate use of fats and oils and fatty foods is advised. Try to use more of the polyunsaturated types and less of the animal fats.    "

## 2023-01-12 NOTE — PROGRESS NOTES
Subjective:     Sukumar Bob is a 42 y.o. male, to the office today for: ANNUAL WELLNESS EXAM    HPI:    Sukumar Bob has fasted to have bloodwork completed today.  I have reviewed the patient's medical history in detail and updated the computerized patient record.      Health Maintenance Due   Topic Date Due    Influenza Vaccine (1) Never done       Review of Systems   Constitutional:  Positive for fatigue (c/o feeling sluggish). Negative for activity change, appetite change, chills, diaphoresis, fever and unexpected weight change.        Wt Readings from Last 3 Encounters:  01/12/23 1010 : 94.3 kg (208 lb 0.1 oz)  12/21/22 1317 : 90.3 kg (199 lb)  11/19/22 1331 : 90.6 kg (199 lb 11.8 oz)  Weight gain noted.  Ex regularly --- running, weights.  Overall healthy diet, more chicken and fish (some salmon), minimal to no red meat.  Trying to cut back on fried foods.  Does not take vitamins or supplements taken.  Lots of water.   HENT:  Negative for congestion, facial swelling, hearing loss, mouth sores, nosebleeds, postnasal drip, rhinorrhea, sinus pressure, sore throat, trouble swallowing and voice change.    Eyes:  Negative for photophobia, pain and visual disturbance.   Respiratory:  Negative for cough, chest tightness, shortness of breath and wheezing.    Cardiovascular:  Negative for chest pain, palpitations and leg swelling.   Gastrointestinal:  Negative for abdominal pain, blood in stool, constipation, diarrhea, nausea, rectal pain and vomiting.   Genitourinary:  Negative for decreased urine volume, difficulty urinating, dysuria, flank pain, frequency, hematuria and urgency.        Followed by Urology for frequent pains with urinary infections.  He has rec'd testosterone injections in the past for low-T, has been off of injection since around last year.  He reports wishing to restart supplementation as it helped his level and felt better.   Musculoskeletal:  Positive for arthralgias  (intermittent///noted to have an elevated uric acid level). Negative for back pain, gait problem, joint swelling, myalgias, neck pain and neck stiffness.   Skin:  Negative for color change, pallor, rash and wound.   Neurological:  Negative for dizziness, syncope, facial asymmetry, speech difficulty, weakness, light-headedness, numbness and headaches.   Hematological:  Negative for adenopathy. Does not bruise/bleed easily.   Psychiatric/Behavioral: Negative.         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    Current Facility-Administered Medications:     testosterone cypionate injection 200 mg, 200 mg, Intramuscular, Q28 Days, Jet Jefferson MD, 200 mg at 08/16/22 1149      Past Medical History:   Diagnosis Date    Epididymoorchitis 9/25/2020    HSV (herpes simplex virus) infection     Mild chronic anemia        Past Surgical History:   Procedure Laterality Date    CIRCUMCISION N/A 08/13/2020    VASECTOMY Bilateral 08/13/2020       Family History   Problem Relation Age of Onset    No Known Problems Mother     No Known Problems Father     No Known Problems Sister     No Known Problems Brother     Hypertension Maternal Grandmother     Stroke Maternal Grandmother     No Known Problems Sister     No Known Problems Brother     No Known Problems Son     No Known Problems Son     Throat cancer Cousin     Diabetes Neg Hx     Heart disease Neg Hx        Social History     Tobacco Use    Smoking status: Never    Smokeless tobacco: Never   Substance Use Topics    Alcohol use: Socially    Drug use: No         Objective:     Vitals:    01/12/23 1010   BP: 118/82   BP Location: Right arm   Patient Position: Sitting   Pulse: 74   Temp: 98.2 °F (36.8 °C)   TempSrc: Oral   SpO2: 98%   Weight: 94.3 kg  "(208 lb 0.1 oz)   Height: 5' 8" (1.727 m)       Body mass index is 31.63 kg/m².      Physical Exam  Constitutional:       General: He is not in acute distress.     Appearance: He is well-developed. He is not diaphoretic.   HENT:      Head: Normocephalic and atraumatic.      Right Ear: Tympanic membrane, ear canal and external ear normal. There is no impacted cerumen.      Left Ear: Tympanic membrane, ear canal and external ear normal. There is no impacted cerumen.      Nose: Nose normal. No congestion or rhinorrhea.      Mouth/Throat:      Mouth: Mucous membranes are moist.      Pharynx: Oropharynx is clear. No oropharyngeal exudate or posterior oropharyngeal erythema.   Eyes:      General: No scleral icterus.        Right eye: No discharge.         Left eye: No discharge.      Conjunctiva/sclera: Conjunctivae normal.      Pupils: Pupils are equal, round, and reactive to light.   Neck:      Thyroid: No thyromegaly.      Vascular: No JVD.      Trachea: No tracheal deviation.   Cardiovascular:      Rate and Rhythm: Normal rate and regular rhythm.      Pulses: Normal pulses.      Heart sounds: Normal heart sounds. No murmur heard.    No friction rub. No gallop.   Pulmonary:      Effort: Pulmonary effort is normal. No respiratory distress.      Breath sounds: Normal breath sounds. No wheezing.   Chest:      Chest wall: No tenderness.   Abdominal:      General: Bowel sounds are normal. There is no distension.      Palpations: Abdomen is soft. There is no mass.      Tenderness: There is no abdominal tenderness.   Genitourinary:     Comments: Defer to Urology  Musculoskeletal:         General: No swelling, tenderness or deformity. Normal range of motion.      Cervical back: Normal range of motion and neck supple.   Lymphadenopathy:      Cervical: No cervical adenopathy.   Skin:     General: Skin is warm and dry.      Capillary Refill: Capillary refill takes less than 2 seconds.      Findings: No erythema or rash. "   Neurological:      Mental Status: He is alert and oriented to person, place, and time.      Sensory: No sensory deficit.      Motor: No weakness or abnormal muscle tone.      Coordination: Coordination normal.      Gait: Gait normal.   Psychiatric:         Mood and Affect: Mood normal.         Behavior: Behavior normal.         Thought Content: Thought content normal.         Judgment: Judgment normal.       REVIEWED LABS:    Lab Results   Component Value Date    WBC 4.44 01/07/2022    RBC 4.35 (L) 01/07/2022    HGB 13.2 (L) 01/07/2022    HCT 40.1 01/07/2022    MCV 92 01/07/2022    MCH 30.3 01/07/2022    MCHC 32.9 01/07/2022    RDW 13.0 01/07/2022     01/07/2022    MPV 12.4 01/07/2022    GRAN 1.5 (L) 01/07/2022    GRAN 32.5 (L) 01/07/2022    LYMPH 2.3 01/07/2022    LYMPH 51.4 (H) 01/07/2022    MONO 0.6 01/07/2022    MONO 14.0 01/07/2022    EOS 0.1 01/07/2022    BASO 0.02 01/07/2022    EOSINOPHIL 1.6 01/07/2022    BASOPHIL 0.5 01/07/2022       Sodium   Date Value Ref Range Status   01/07/2022 140 136 - 145 mmol/L Final     Potassium   Date Value Ref Range Status   01/07/2022 4.4 3.5 - 5.1 mmol/L Final     Chloride   Date Value Ref Range Status   01/07/2022 108 95 - 110 mmol/L Final     CO2   Date Value Ref Range Status   01/07/2022 27 23 - 29 mmol/L Final     Glucose   Date Value Ref Range Status   01/07/2022 93 70 - 110 mg/dL Final     BUN   Date Value Ref Range Status   01/07/2022 16 6 - 20 mg/dL Final     Creatinine   Date Value Ref Range Status   01/07/2022 1.2 0.5 - 1.4 mg/dL Final     Calcium   Date Value Ref Range Status   01/07/2022 9.0 8.7 - 10.5 mg/dL Final     Total Protein   Date Value Ref Range Status   01/07/2022 6.8 6.0 - 8.4 g/dL Final     Albumin   Date Value Ref Range Status   01/07/2022 4.0 3.5 - 5.2 g/dL Final     Total Bilirubin   Date Value Ref Range Status   01/07/2022 1.0 0.1 - 1.0 mg/dL Final     Comment:     For infants and newborns, interpretation of results should be based  on  gestational age, weight and in agreement with clinical  observations.    Premature Infant recommended reference ranges:  Up to 24 hours.............<8.0 mg/dL  Up to 48 hours............<12.0 mg/dL  3-5 days..................<15.0 mg/dL  6-29 days.................<15.0 mg/dL       Alkaline Phosphatase   Date Value Ref Range Status   01/07/2022 62 55 - 135 U/L Final     AST   Date Value Ref Range Status   01/07/2022 23 10 - 40 U/L Final     ALT   Date Value Ref Range Status   01/07/2022 22 10 - 44 U/L Final     Anion Gap   Date Value Ref Range Status   01/07/2022 5 (L) 8 - 16 mmol/L Final       eGFR if    Date Value Ref Range Status   01/07/2022 >60.0 >60 mL/min/1.73 m^2 Final        Lab Results   Component Value Date    CHOL 213 (H) 01/07/2022     Lab Results   Component Value Date    HDL 48 01/07/2022     Lab Results   Component Value Date    LDLCALC 144.0 01/07/2022     Lab Results   Component Value Date    TRIG 105 01/07/2022       Lab Results   Component Value Date    TSH 1.710 01/07/2022    FREET4 1.00 01/06/2016       Lab Results   Component Value Date    HGBA1C 5.5 01/07/2022       Lab Results   Component Value Date    PSA 0.40 01/07/2022    PSA 0.44 02/20/2015       Lab Results   Component Value Date    URICACID 7.2 (H) 01/06/2016       Lab Results   Component Value Date    [id number] Negative 11/19/2022       Lab Results   Component Value Date    HEPAIGM Negative 01/16/2021    HEPBIGM Negative 01/16/2021    HEPCAB Negative 11/19/2022       Microalb/Creat Ratio   Date Value Ref Range Status   02/20/2015 1.9 0.0 - 30.0 ug/mg Final       Diagnosis/Assessment:     1. Preventative health care  - CBC Auto Differential; Future  - Comprehensive Metabolic Panel; Future  - TSH; Future  - PSA, Screening; Future  - Lipid Panel; Future  - Hemoglobin A1C; Future  - RPR; Future    2. Hypercholesterolemia  - CBC Auto Differential; Future  - Comprehensive Metabolic Panel; Future  - TSH; Future  - Lipid Panel;  Future  - Hemoglobin A1C; Future    3. Hyperuricemia -- low purine diet  - Uric Acid; Future    4. Low testosterone  - Testosterone; Future    5. Class 1 obesity with serious comorbidity and body mass index (BMI) of 31.0 to 31.9 in adult, unspecified obesity type  - CBC Auto Differential; Future  - Comprehensive Metabolic Panel; Future  - TSH; Future  - Lipid Panel; Future  - Hemoglobin A1C; Future      Plan:     See Urology as scheduled on 2/8/2023.  Refuses flu shot today.  Healthy dietary and lifestyle changes discussed.    Follow-Up:     Pending lab.  RTC as directed or prn.        VALDO Napier  [facility]       Patient Care Team:  Jet Jefferson MD as PCP - General (Internal Medicine)  Abraham Reaves MD as Consulting Physician (Urology)  Augustine Hopkins MD as Consulting Physician (Pain Medicine)

## 2023-01-13 LAB
ALBUMIN SERPL BCP-MCNC: 4.3 G/DL (ref 3.5–5.2)
ALP SERPL-CCNC: 60 U/L (ref 55–135)
ALT SERPL W/O P-5'-P-CCNC: 18 U/L (ref 10–44)
ANION GAP SERPL CALC-SCNC: 11 MMOL/L (ref 8–16)
AST SERPL-CCNC: 28 U/L (ref 10–40)
BASOPHILS # BLD AUTO: 0.03 K/UL (ref 0–0.2)
BASOPHILS NFR BLD: 0.6 % (ref 0–1.9)
BILIRUB SERPL-MCNC: 1.2 MG/DL (ref 0.1–1)
BUN SERPL-MCNC: 17 MG/DL (ref 6–20)
CALCIUM SERPL-MCNC: 9.5 MG/DL (ref 8.7–10.5)
CHLORIDE SERPL-SCNC: 105 MMOL/L (ref 95–110)
CHOLEST SERPL-MCNC: 178 MG/DL (ref 120–199)
CHOLEST/HDLC SERPL: 3.4 {RATIO} (ref 2–5)
CO2 SERPL-SCNC: 22 MMOL/L (ref 23–29)
COMPLEXED PSA SERPL-MCNC: 0.48 NG/ML (ref 0–4)
CREAT SERPL-MCNC: 1.2 MG/DL (ref 0.5–1.4)
DIFFERENTIAL METHOD: ABNORMAL
EOSINOPHIL # BLD AUTO: 0.1 K/UL (ref 0–0.5)
EOSINOPHIL NFR BLD: 1.7 % (ref 0–8)
ERYTHROCYTE [DISTWIDTH] IN BLOOD BY AUTOMATED COUNT: 13.5 % (ref 11.5–14.5)
EST. GFR  (NO RACE VARIABLE): >60 ML/MIN/1.73 M^2
ESTIMATED AVG GLUCOSE: 111 MG/DL (ref 68–131)
GLUCOSE SERPL-MCNC: 84 MG/DL (ref 70–110)
HBA1C MFR BLD: 5.5 % (ref 4–5.6)
HCT VFR BLD AUTO: 40.7 % (ref 40–54)
HDLC SERPL-MCNC: 53 MG/DL (ref 40–75)
HDLC SERPL: 29.8 % (ref 20–50)
HGB BLD-MCNC: 13.3 G/DL (ref 14–18)
IMM GRANULOCYTES # BLD AUTO: 0.01 K/UL (ref 0–0.04)
IMM GRANULOCYTES NFR BLD AUTO: 0.2 % (ref 0–0.5)
LDLC SERPL CALC-MCNC: 109.8 MG/DL (ref 63–159)
LYMPHOCYTES # BLD AUTO: 2.5 K/UL (ref 1–4.8)
LYMPHOCYTES NFR BLD: 48.5 % (ref 18–48)
MCH RBC QN AUTO: 30 PG (ref 27–31)
MCHC RBC AUTO-ENTMCNC: 32.7 G/DL (ref 32–36)
MCV RBC AUTO: 92 FL (ref 82–98)
MONOCYTES # BLD AUTO: 0.7 K/UL (ref 0.3–1)
MONOCYTES NFR BLD: 13.8 % (ref 4–15)
NEUTROPHILS # BLD AUTO: 1.8 K/UL (ref 1.8–7.7)
NEUTROPHILS NFR BLD: 35.2 % (ref 38–73)
NONHDLC SERPL-MCNC: 125 MG/DL
NRBC BLD-RTO: 0 /100 WBC
PLATELET # BLD AUTO: 216 K/UL (ref 150–450)
PMV BLD AUTO: 12.3 FL (ref 9.2–12.9)
POTASSIUM SERPL-SCNC: 4.5 MMOL/L (ref 3.5–5.1)
PROT SERPL-MCNC: 7.3 G/DL (ref 6–8.4)
RBC # BLD AUTO: 4.43 M/UL (ref 4.6–6.2)
RPR SER QL: NORMAL
SODIUM SERPL-SCNC: 138 MMOL/L (ref 136–145)
TESTOST SERPL-MCNC: 355 NG/DL (ref 304–1227)
TRIGL SERPL-MCNC: 76 MG/DL (ref 30–150)
TSH SERPL DL<=0.005 MIU/L-ACNC: 0.8 UIU/ML (ref 0.4–4)
URATE SERPL-MCNC: 7 MG/DL (ref 3.4–7)
WBC # BLD AUTO: 5.22 K/UL (ref 3.9–12.7)

## 2023-01-24 DIAGNOSIS — R17 ELEVATED BILIRUBIN: Primary | ICD-10-CM

## 2023-01-26 DIAGNOSIS — R79.89 LOW TESTOSTERONE: Primary | ICD-10-CM

## 2023-02-08 ENCOUNTER — OFFICE VISIT (OUTPATIENT)
Dept: UROLOGY | Facility: CLINIC | Age: 43
End: 2023-02-08
Payer: COMMERCIAL

## 2023-02-08 VITALS
HEART RATE: 58 BPM | WEIGHT: 208 LBS | DIASTOLIC BLOOD PRESSURE: 79 MMHG | BODY MASS INDEX: 31.52 KG/M2 | HEIGHT: 68 IN | SYSTOLIC BLOOD PRESSURE: 118 MMHG

## 2023-02-08 DIAGNOSIS — N48.1 BALANITIS: ICD-10-CM

## 2023-02-08 DIAGNOSIS — R79.89 LOW TESTOSTERONE: Primary | ICD-10-CM

## 2023-02-08 DIAGNOSIS — N45.3 EPIDIDYMOORCHITIS: ICD-10-CM

## 2023-02-08 PROCEDURE — 3008F BODY MASS INDEX DOCD: CPT | Mod: CPTII,S$GLB,, | Performed by: UROLOGY

## 2023-02-08 PROCEDURE — 3044F HG A1C LEVEL LT 7.0%: CPT | Mod: CPTII,S$GLB,, | Performed by: UROLOGY

## 2023-02-08 PROCEDURE — 3078F PR MOST RECENT DIASTOLIC BLOOD PRESSURE < 80 MM HG: ICD-10-PCS | Mod: CPTII,S$GLB,, | Performed by: UROLOGY

## 2023-02-08 PROCEDURE — 3074F PR MOST RECENT SYSTOLIC BLOOD PRESSURE < 130 MM HG: ICD-10-PCS | Mod: CPTII,S$GLB,, | Performed by: UROLOGY

## 2023-02-08 PROCEDURE — 99999 PR PBB SHADOW E&M-EST. PATIENT-LVL III: ICD-10-PCS | Mod: PBBFAC,,, | Performed by: UROLOGY

## 2023-02-08 PROCEDURE — 99999 PR PBB SHADOW E&M-EST. PATIENT-LVL III: CPT | Mod: PBBFAC,,, | Performed by: UROLOGY

## 2023-02-08 PROCEDURE — 99214 OFFICE O/P EST MOD 30 MIN: CPT | Mod: S$GLB,,, | Performed by: UROLOGY

## 2023-02-08 PROCEDURE — 3044F PR MOST RECENT HEMOGLOBIN A1C LEVEL <7.0%: ICD-10-PCS | Mod: CPTII,S$GLB,, | Performed by: UROLOGY

## 2023-02-08 PROCEDURE — 1160F RVW MEDS BY RX/DR IN RCRD: CPT | Mod: CPTII,S$GLB,, | Performed by: UROLOGY

## 2023-02-08 PROCEDURE — 99214 PR OFFICE/OUTPT VISIT, EST, LEVL IV, 30-39 MIN: ICD-10-PCS | Mod: S$GLB,,, | Performed by: UROLOGY

## 2023-02-08 PROCEDURE — 3078F DIAST BP <80 MM HG: CPT | Mod: CPTII,S$GLB,, | Performed by: UROLOGY

## 2023-02-08 PROCEDURE — 1159F MED LIST DOCD IN RCRD: CPT | Mod: CPTII,S$GLB,, | Performed by: UROLOGY

## 2023-02-08 PROCEDURE — 1160F PR REVIEW ALL MEDS BY PRESCRIBER/CLIN PHARMACIST DOCUMENTED: ICD-10-PCS | Mod: CPTII,S$GLB,, | Performed by: UROLOGY

## 2023-02-08 PROCEDURE — 3074F SYST BP LT 130 MM HG: CPT | Mod: CPTII,S$GLB,, | Performed by: UROLOGY

## 2023-02-08 PROCEDURE — 3008F PR BODY MASS INDEX (BMI) DOCUMENTED: ICD-10-PCS | Mod: CPTII,S$GLB,, | Performed by: UROLOGY

## 2023-02-08 PROCEDURE — 1159F PR MEDICATION LIST DOCUMENTED IN MEDICAL RECORD: ICD-10-PCS | Mod: CPTII,S$GLB,, | Performed by: UROLOGY

## 2023-02-08 RX ORDER — TESTOSTERONE CYPIONATE 200 MG/ML
300 INJECTION, SOLUTION INTRAMUSCULAR ONCE
Status: DISCONTINUED | OUTPATIENT
Start: 2023-02-08 | End: 2023-07-07

## 2023-02-08 NOTE — PROGRESS NOTES
Chief Complaint:   Encounter Diagnoses   Name Primary?    Low testosterone Yes    Epididymoorchitis     Balanitis        HPI:    2/8/23- after taking the full course of antibiotics he is had no recurrent epididymo-orchitis, no evidence of balanitis, here today to discuss signs and symptoms of low testosterone.  40-year-old gentleman who reports with recurrent balanitis, having to use Lotrisone cream on multiple ocurrences now.  Patient states is also having some pain and discomfort peeling back the skin, possibly an early phimosis.  He is having no voiding complaints, no gross hematuria.  Patient has had no other urological history per his report, except for recent rise in creatinine which appears to be stabilized.  No family history of urological cancers or stones.  Patient also see today's discuss possible vasectomy.  He has 3 children, he is certain that he desires no more. He is aware of other forms of contraception. He is aware that vasectomy is to be considered permanent/irreversible.  This is a consultation from Dr. Jet Jefferson.    PATIENT HISTORY:    Past Medical History:   Diagnosis Date    Epididymoorchitis 9/25/2020    HSV (herpes simplex virus) infection     Mild chronic anemia        Past Surgical History:   Procedure Laterality Date    CIRCUMCISION N/A 08/13/2020    Procedure: CIRCUMCISION;  Surgeon: Abraham Reaves MD;  Location: [facility] OR;  Service: Urology;  Laterality: N/A;    VASECTOMY Bilateral 08/13/2020    Procedure: VASECTOMY;  Surgeon: Abraham Reaves MD;  Location: [facility] OR;  Service: Urology;  Laterality: Bilateral;       Family History   Problem Relation Age of Onset    No Known Problems Mother     No Known Problems Father     No Known Problems Sister     No Known Problems Brother     Hypertension Maternal Grandmother     Stroke Maternal Grandmother     No Known Problems Sister     No Known Problems Brother     No Known Problems Son     No Known Problems Son     Throat  cancer Cousin     Diabetes Neg Hx     Heart disease Neg Hx        Social History     Socioeconomic History    Marital status:     Number of children: 2   Occupational History    Occupation:      Comment: [employer]   Tobacco Use    Smoking status: Never    Smokeless tobacco: Never   Substance and Sexual Activity    Alcohol use: Yes     Comment: socially    Drug use: No    Sexual activity: Yes     Partners: Female     Birth control/protection: Surgical     Comment: vasectomy   Social History Narrative    He wears seatbelt. He is expecting a daughter soon.       Allergies:  Patient has no known allergies.    Medications:  currently has no medications in their medication list.    Review of Systems:  General: No fever, chills, fatigability, or weight loss.  Skin: No rashes, itching, or changes in color or texture of skin.  Chest: Denies BECKMAN, cyanosis, wheezing, cough, and sputum production.  Abdomen: Appetite fine. No weight loss. Denies diarrhea, abdominal pain, hematemesis, or blood in stool.  Musculoskeletal: No joint stiffness or swelling. Denies back pain.  : As above.  All other review of systems negative.      Physical Exam:  Vitals:    02/08/23 1602   BP: 118/79   Pulse: (!) 58     General: A&Ox3, no apparent distress, no deformities  Neck: No masses, normal ROM  Lungs: normal inspiration, no use of accessory muscles  Heart: normal pulse, no arrhythmias  Abdomen: Soft, NT, ND, no masses, no hernias, no hepatosplenomegaly  Skin: The skin is warm and dry. No jaundice.  Ext: No c/c/e.  : 12/22- Test desc dominic, firm right testicle and epididymitis. Normal penile and scrotal skin. Meatus normal.    Labs/Studies:   Testosterone 355 1/23  PSA 0.38 1/23  MICHELE stable, bilateral hydroceles 10/20  Semen analysis number two is clear, no evidence of sperm 11/13/2020  Semen analysis number one is clear, no evidence of sperm 10/30/2020    Impression/Plan:       1. Balanitis-   circumcision/vasectomy 8/13/20    No evidence of recurrent balanitis.    2. epididymoorchitis- since taking the full course of antibiotics this has now resolved.  Call with a recurrence.      3. Hypogonadism- patient has definite signs of hypogonadism with decreased energy, sex drive and mild erectile dysfunction.  Will go ahead and initiate testosterone 300 mg monthly as he does not want to pursue cutaneous therapy.  Will come in for a nurse visit to be taught, see me back in 3 months with labs.  Call with any complaints in the meantime.

## 2023-03-01 ENCOUNTER — TELEPHONE (OUTPATIENT)
Dept: FAMILY MEDICINE | Facility: CLINIC | Age: 43
End: 2023-03-01
Payer: COMMERCIAL

## 2023-03-01 NOTE — TELEPHONE ENCOUNTER
----- Message from Grazyna Lund sent at 3/1/2023  3:00 PM CST -----  Regarding: Travel Vaccine Inquiry  Contact: Sukumar Patino is requesting a  callback from the nurse in regards to knowing if the provider gives the vaccines for traveling out of the country.    Sukumar can be reached at [phone] (home)      Thanks

## 2023-03-01 NOTE — TELEPHONE ENCOUNTER
Call returned.  Pt asking if we give the yellow fever, polio and other vaccines that he needs to go out of the country.  Advised [facility] to call [facility] because we do not give those vaccines.  He voiced understanding

## 2023-03-02 ENCOUNTER — HOSPITAL ENCOUNTER (EMERGENCY)
Facility: HOSPITAL | Age: 43
Discharge: LEFT BEFORE DISCHARGE | End: 2023-03-02
Payer: COMMERCIAL

## 2023-03-02 VITALS
RESPIRATION RATE: 18 BRPM | SYSTOLIC BLOOD PRESSURE: 125 MMHG | TEMPERATURE: 98 F | HEIGHT: 68 IN | WEIGHT: 209.75 LBS | BODY MASS INDEX: 31.79 KG/M2 | OXYGEN SATURATION: 97 % | DIASTOLIC BLOOD PRESSURE: 70 MMHG | HEART RATE: 84 BPM

## 2023-03-02 DIAGNOSIS — T14.8XXA STAB WOUND: ICD-10-CM

## 2023-03-02 PROCEDURE — 99283 EMERGENCY DEPT VISIT LOW MDM: CPT | Mod: 25

## 2023-03-02 PROCEDURE — 99900041 HC LEFT WITHOUT BEING SEEN- EMERGENCY
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
## 2023-03-07 ENCOUNTER — TELEPHONE (OUTPATIENT)
Dept: FAMILY MEDICINE | Facility: CLINIC | Age: 43
End: 2023-03-07
Payer: COMMERCIAL

## 2023-03-07 ENCOUNTER — TELEPHONE (OUTPATIENT)
Dept: FAMILY MEDICINE | Facility: CLINIC | Age: 43
End: 2023-03-07

## 2023-03-07 NOTE — TELEPHONE ENCOUNTER
----- Message from Katelyn Navarro sent at 3/7/2023  2:00 PM CST -----  Contact: Sukumar Patino Is requesting a call back from the nurse in regards to lab work . Please call him back at [phone]    Thanks  CF

## 2023-03-07 NOTE — TELEPHONE ENCOUNTER
Pt was calling in regards to the lab he had back in 01/24/23 pt states he will come to get the lab completed     GABRIELLA Geronimo

## 2023-03-07 NOTE — TELEPHONE ENCOUNTER
----- Message from Monserrat Gifford MA sent at 3/7/2023  2:07 PM CST -----  Contact: Sukumar    ----- Message -----  From: Katelyn Navarro  Sent: 3/7/2023   2:04 PM CST  To: Ronny ALLRED Staff    Sukumar is requesting a call back in regards to his testosterone  injection . Please call him back at [phone]      Thanks  CF

## 2023-03-17 ENCOUNTER — LAB VISIT (OUTPATIENT)
Dept: LAB | Facility: HOSPITAL | Age: 43
End: 2023-03-17
Attending: UROLOGY
Payer: COMMERCIAL

## 2023-03-17 DIAGNOSIS — R79.89 LOW TESTOSTERONE: ICD-10-CM

## 2023-03-17 LAB
ALBUMIN SERPL BCP-MCNC: 4.1 G/DL (ref 3.5–5.2)
ALP SERPL-CCNC: 60 U/L (ref 55–135)
ALT SERPL W/O P-5'-P-CCNC: 15 U/L (ref 10–44)
AST SERPL-CCNC: 20 U/L (ref 10–40)
BASOPHILS # BLD AUTO: 0.03 K/UL (ref 0–0.2)
BASOPHILS NFR BLD: 0.7 % (ref 0–1.9)
BILIRUB DIRECT SERPL-MCNC: 0.4 MG/DL (ref 0.1–0.3)
BILIRUB SERPL-MCNC: 1.3 MG/DL (ref 0.1–1)
DIFFERENTIAL METHOD: ABNORMAL
EOSINOPHIL # BLD AUTO: 0.1 K/UL (ref 0–0.5)
EOSINOPHIL NFR BLD: 1.2 % (ref 0–8)
ERYTHROCYTE [DISTWIDTH] IN BLOOD BY AUTOMATED COUNT: 12.9 % (ref 11.5–14.5)
HCT VFR BLD AUTO: 39.5 % (ref 40–54)
HGB BLD-MCNC: 13.1 G/DL (ref 14–18)
IMM GRANULOCYTES # BLD AUTO: 0 K/UL (ref 0–0.04)
IMM GRANULOCYTES NFR BLD AUTO: 0 % (ref 0–0.5)
LH SERPL-ACNC: 2.1 MIU/ML (ref 0.6–12.1)
LYMPHOCYTES # BLD AUTO: 1.9 K/UL (ref 1–4.8)
LYMPHOCYTES NFR BLD: 45.3 % (ref 18–48)
MCH RBC QN AUTO: 30.6 PG (ref 27–31)
MCHC RBC AUTO-ENTMCNC: 33.2 G/DL (ref 32–36)
MCV RBC AUTO: 92 FL (ref 82–98)
MONOCYTES # BLD AUTO: 0.6 K/UL (ref 0.3–1)
MONOCYTES NFR BLD: 14.2 % (ref 4–15)
NEUTROPHILS # BLD AUTO: 1.6 K/UL (ref 1.8–7.7)
NEUTROPHILS NFR BLD: 38.6 % (ref 38–73)
NRBC BLD-RTO: 0 /100 WBC
PLATELET # BLD AUTO: 213 K/UL (ref 150–450)
PMV BLD AUTO: 11.6 FL (ref 9.2–12.9)
PROLACTIN SERPL IA-MCNC: 9.7 NG/ML (ref 3.5–19.4)
PROT SERPL-MCNC: 6.8 G/DL (ref 6–8.4)
RBC # BLD AUTO: 4.28 M/UL (ref 4.6–6.2)
TESTOST SERPL-MCNC: 296 NG/DL (ref 304–1227)
WBC # BLD AUTO: 4.15 K/UL (ref 3.9–12.7)

## 2023-03-17 PROCEDURE — 85025 COMPLETE CBC W/AUTO DIFF WBC: CPT | Performed by: UROLOGY

## 2023-03-17 PROCEDURE — 84270 ASSAY OF SEX HORMONE GLOBUL: CPT | Performed by: UROLOGY

## 2023-03-17 PROCEDURE — 84146 ASSAY OF PROLACTIN: CPT | Performed by: UROLOGY

## 2023-03-17 PROCEDURE — 36415 COLL VENOUS BLD VENIPUNCTURE: CPT | Mod: PO | Performed by: UROLOGY

## 2023-03-17 PROCEDURE — 80076 HEPATIC FUNCTION PANEL: CPT | Performed by: UROLOGY

## 2023-03-17 PROCEDURE — 82672 ASSAY OF ESTROGEN: CPT | Performed by: UROLOGY

## 2023-03-17 PROCEDURE — 84403 ASSAY OF TOTAL TESTOSTERONE: CPT | Mod: 91 | Performed by: UROLOGY

## 2023-03-17 PROCEDURE — 84403 ASSAY OF TOTAL TESTOSTERONE: CPT | Performed by: UROLOGY

## 2023-03-17 PROCEDURE — 83002 ASSAY OF GONADOTROPIN (LH): CPT | Performed by: UROLOGY

## 2023-03-21 LAB
ESTROGEN SERPL-MCNC: 62 PG/ML
ESTROGEN SERPL-MCNC: 62 PG/ML

## 2023-03-23 LAB
ALBUMIN SERPL-MCNC: 4.2 G/DL (ref 3.6–5.1)
SHBG SERPL-SCNC: 16 NMOL/L (ref 10–50)
TESTOST FREE SERPL-MCNC: 66.2 PG/ML (ref 46–224)
TESTOST SERPL-MCNC: 300 NG/DL (ref 250–1100)
TESTOSTERONE.FREE+WB SERPL-MCNC: 127.6 NG/DL (ref 110–575)

## 2023-03-28 ENCOUNTER — PATIENT MESSAGE (OUTPATIENT)
Dept: UROLOGY | Facility: CLINIC | Age: 43
End: 2023-03-28
Payer: COMMERCIAL

## 2023-03-28 NOTE — TELEPHONE ENCOUNTER
Attempted to contact pt to inform him that Dr. Reaves wanted him to rtc in 3 months; no answer.  Appt scheduled and mailed.

## 2023-07-07 ENCOUNTER — HOSPITAL ENCOUNTER (OUTPATIENT)
Dept: RADIOLOGY | Facility: HOSPITAL | Age: 43
Discharge: HOME | End: 2023-07-07
Attending: INTERNAL MEDICINE
Payer: COMMERCIAL

## 2023-07-07 ENCOUNTER — OFFICE VISIT (OUTPATIENT)
Dept: FAMILY MEDICINE | Facility: CLINIC | Age: 43
End: 2023-07-07
Payer: COMMERCIAL

## 2023-07-07 VITALS
BODY MASS INDEX: 31.78 KG/M2 | DIASTOLIC BLOOD PRESSURE: 70 MMHG | WEIGHT: 209 LBS | HEART RATE: 60 BPM | RESPIRATION RATE: 16 BRPM | SYSTOLIC BLOOD PRESSURE: 120 MMHG | TEMPERATURE: 98 F | OXYGEN SATURATION: 99 %

## 2023-07-07 DIAGNOSIS — M25.511 ACUTE PAIN OF BOTH SHOULDERS: ICD-10-CM

## 2023-07-07 DIAGNOSIS — M79.645 PAIN OF FINGER OF LEFT HAND: ICD-10-CM

## 2023-07-07 DIAGNOSIS — M25.561 ACUTE PAIN OF RIGHT KNEE: ICD-10-CM

## 2023-07-07 DIAGNOSIS — M54.50 ACUTE BILATERAL LOW BACK PAIN WITHOUT SCIATICA: ICD-10-CM

## 2023-07-07 DIAGNOSIS — M25.571 ACUTE RIGHT ANKLE PAIN: ICD-10-CM

## 2023-07-07 DIAGNOSIS — M25.512 ACUTE PAIN OF BOTH SHOULDERS: ICD-10-CM

## 2023-07-07 DIAGNOSIS — M79.604 PAIN OF RIGHT LOWER EXTREMITY: ICD-10-CM

## 2023-07-07 DIAGNOSIS — M25.561 ACUTE PAIN OF RIGHT KNEE: Primary | ICD-10-CM

## 2023-07-07 PROCEDURE — 73562 X-RAY EXAM OF KNEE 3: CPT | Mod: 26,RT,, | Performed by: RADIOLOGY

## 2023-07-07 PROCEDURE — 73030 XR SHOULDER COMPLETE 2 OR MORE VIEWS LEFT: ICD-10-PCS | Mod: 26,LT,, | Performed by: RADIOLOGY

## 2023-07-07 PROCEDURE — 72100 X-RAY EXAM L-S SPINE 2/3 VWS: CPT | Mod: 26,,, | Performed by: RADIOLOGY

## 2023-07-07 PROCEDURE — 73560 X-RAY EXAM OF KNEE 1 OR 2: CPT | Mod: 26,LT,, | Performed by: RADIOLOGY

## 2023-07-07 PROCEDURE — 99999 PR PBB SHADOW E&M-EST. PATIENT-LVL V: ICD-10-PCS | Mod: PBBFAC,,, | Performed by: INTERNAL MEDICINE

## 2023-07-07 PROCEDURE — 73030 X-RAY EXAM OF SHOULDER: CPT | Mod: TC,FY,PO,RT

## 2023-07-07 PROCEDURE — 3074F SYST BP LT 130 MM HG: CPT | Mod: CPTII,S$GLB,, | Performed by: INTERNAL MEDICINE

## 2023-07-07 PROCEDURE — 73610 XR ANKLE COMPLETE 3 VIEW RIGHT: ICD-10-PCS | Mod: 26,RT,, | Performed by: RADIOLOGY

## 2023-07-07 PROCEDURE — 73560 X-RAY EXAM OF KNEE 1 OR 2: CPT | Mod: 59,TC,FY,PO,LT

## 2023-07-07 PROCEDURE — 73610 X-RAY EXAM OF ANKLE: CPT | Mod: TC,FY,PO,RT

## 2023-07-07 PROCEDURE — 73562 XR KNEE ORTHO RIGHT: ICD-10-PCS | Mod: 26,RT,, | Performed by: RADIOLOGY

## 2023-07-07 PROCEDURE — 73030 X-RAY EXAM OF SHOULDER: CPT | Mod: TC,FY,PO,LT

## 2023-07-07 PROCEDURE — 99214 OFFICE O/P EST MOD 30 MIN: CPT | Mod: S$GLB,,, | Performed by: INTERNAL MEDICINE

## 2023-07-07 PROCEDURE — 1159F PR MEDICATION LIST DOCUMENTED IN MEDICAL RECORD: ICD-10-PCS | Mod: CPTII,S$GLB,, | Performed by: INTERNAL MEDICINE

## 2023-07-07 PROCEDURE — 72100 XR LUMBAR SPINE AP AND LATERAL: ICD-10-PCS | Mod: 26,,, | Performed by: RADIOLOGY

## 2023-07-07 PROCEDURE — 3044F HG A1C LEVEL LT 7.0%: CPT | Mod: CPTII,S$GLB,, | Performed by: INTERNAL MEDICINE

## 2023-07-07 PROCEDURE — 3078F PR MOST RECENT DIASTOLIC BLOOD PRESSURE < 80 MM HG: ICD-10-PCS | Mod: CPTII,S$GLB,, | Performed by: INTERNAL MEDICINE

## 2023-07-07 PROCEDURE — 73610 X-RAY EXAM OF ANKLE: CPT | Mod: 26,RT,, | Performed by: RADIOLOGY

## 2023-07-07 PROCEDURE — 99214 PR OFFICE/OUTPT VISIT, EST, LEVL IV, 30-39 MIN: ICD-10-PCS | Mod: S$GLB,,, | Performed by: INTERNAL MEDICINE

## 2023-07-07 PROCEDURE — 73560 XR KNEE ORTHO RIGHT: ICD-10-PCS | Mod: 26,LT,, | Performed by: RADIOLOGY

## 2023-07-07 PROCEDURE — 3044F PR MOST RECENT HEMOGLOBIN A1C LEVEL <7.0%: ICD-10-PCS | Mod: CPTII,S$GLB,, | Performed by: INTERNAL MEDICINE

## 2023-07-07 PROCEDURE — 72100 X-RAY EXAM L-S SPINE 2/3 VWS: CPT | Mod: TC,FY,PO

## 2023-07-07 PROCEDURE — 99999 PR PBB SHADOW E&M-EST. PATIENT-LVL V: CPT | Mod: PBBFAC,,, | Performed by: INTERNAL MEDICINE

## 2023-07-07 PROCEDURE — 73590 X-RAY EXAM OF LOWER LEG: CPT | Mod: TC,FY,PO,RT

## 2023-07-07 PROCEDURE — 73140 X-RAY EXAM OF FINGER(S): CPT | Mod: 26,LT,, | Performed by: RADIOLOGY

## 2023-07-07 PROCEDURE — 73140 X-RAY EXAM OF FINGER(S): CPT | Mod: TC,FY,PO,LT

## 2023-07-07 PROCEDURE — 73590 XR TIBIA FIBULA 2 VIEW RIGHT: ICD-10-PCS | Mod: 26,RT,, | Performed by: RADIOLOGY

## 2023-07-07 PROCEDURE — 73030 X-RAY EXAM OF SHOULDER: CPT | Mod: 26,RT,, | Performed by: RADIOLOGY

## 2023-07-07 PROCEDURE — 3008F BODY MASS INDEX DOCD: CPT | Mod: CPTII,S$GLB,, | Performed by: INTERNAL MEDICINE

## 2023-07-07 PROCEDURE — 1159F MED LIST DOCD IN RCRD: CPT | Mod: CPTII,S$GLB,, | Performed by: INTERNAL MEDICINE

## 2023-07-07 PROCEDURE — 73030 X-RAY EXAM OF SHOULDER: CPT | Mod: 26,LT,, | Performed by: RADIOLOGY

## 2023-07-07 PROCEDURE — 3074F PR MOST RECENT SYSTOLIC BLOOD PRESSURE < 130 MM HG: ICD-10-PCS | Mod: CPTII,S$GLB,, | Performed by: INTERNAL MEDICINE

## 2023-07-07 PROCEDURE — 3078F DIAST BP <80 MM HG: CPT | Mod: CPTII,S$GLB,, | Performed by: INTERNAL MEDICINE

## 2023-07-07 PROCEDURE — 3008F PR BODY MASS INDEX (BMI) DOCUMENTED: ICD-10-PCS | Mod: CPTII,S$GLB,, | Performed by: INTERNAL MEDICINE

## 2023-07-07 PROCEDURE — 73140 XR FINGER 2 OR MORE VIEWS LEFT: ICD-10-PCS | Mod: 26,LT,, | Performed by: RADIOLOGY

## 2023-07-07 PROCEDURE — 73590 X-RAY EXAM OF LOWER LEG: CPT | Mod: 26,RT,, | Performed by: RADIOLOGY

## 2023-07-07 RX ORDER — BACLOFEN 10 MG/1
10 TABLET ORAL NIGHTLY PRN
Qty: 30 TABLET | Refills: 0 | Status: SHIPPED | OUTPATIENT
Start: 2023-07-07 | End: 2023-07-07 | Stop reason: SDUPTHER

## 2023-07-07 RX ORDER — IBUPROFEN 800 MG/1
800 TABLET ORAL 2 TIMES DAILY PRN
Qty: 30 TABLET | Refills: 0 | Status: SHIPPED | OUTPATIENT
Start: 2023-07-07 | End: 2024-01-19

## 2023-07-07 RX ORDER — BACLOFEN 10 MG/1
10 TABLET ORAL NIGHTLY PRN
Qty: 30 TABLET | Refills: 0 | Status: SHIPPED | OUTPATIENT
Start: 2023-07-07 | End: 2023-08-04

## 2023-07-07 RX ORDER — IBUPROFEN 800 MG/1
800 TABLET ORAL 2 TIMES DAILY PRN
Qty: 30 TABLET | Refills: 0 | Status: SHIPPED | OUTPATIENT
Start: 2023-07-07 | End: 2023-07-07 | Stop reason: SDUPTHER
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
## 2023-08-04 RX ORDER — BACLOFEN 10 MG/1
TABLET ORAL
Qty: 30 TABLET | Refills: 0 | Status: SHIPPED | OUTPATIENT
Start: 2023-08-04 | End: 2023-09-05

## 2023-08-10 PROBLEM — D64.9 ANEMIA: Status: ACTIVE | Noted: 2023-08-10

## 2023-08-10 PROBLEM — E78.5 DYSLIPIDEMIA: Status: ACTIVE | Noted: 2023-08-10

## 2023-09-04 NOTE — TELEPHONE ENCOUNTER
Refill Routing Note   Medication(s) are not appropriate for processing by [facility] for the following reason(s):      No active prescription written by provider    [facility] action(s):  Defer Care Due:  None identified            Appointments  past 12m or future 3m with PCP    Date Provider   Last Visit   7/7/2023 Jet Jefferson MD   Next Visit   Visit date not found Jet Jefferson MD   ED visits in past 90 days: 1        Note composed:10:29 AM 09/04/2023

## 2023-09-04 NOTE — TELEPHONE ENCOUNTER
No care due was identified.  [facility] Embedded Care Due Messages. Reference number: [id number].   9/04/2023 7:31:39 AM CDT

## 2023-09-05 RX ORDER — BACLOFEN 10 MG/1
TABLET ORAL
Qty: 30 TABLET | Refills: 0 | Status: SHIPPED | OUTPATIENT
Start: 2023-09-05 | End: 2024-01-19

## 2023-09-05 RX ORDER — VALACYCLOVIR HYDROCHLORIDE 1 G/1
1000 TABLET, FILM COATED ORAL 2 TIMES DAILY
Qty: 60 TABLET | Refills: 1 | Status: SHIPPED | OUTPATIENT
Start: 2023-09-05 | End: 2023-09-25

## 2023-09-08 ENCOUNTER — OFFICE VISIT (OUTPATIENT)
Dept: FAMILY MEDICINE | Facility: CLINIC | Age: 43
End: 2023-09-08
Payer: COMMERCIAL

## 2023-09-08 VITALS
OXYGEN SATURATION: 98 % | WEIGHT: 210.13 LBS | DIASTOLIC BLOOD PRESSURE: 78 MMHG | RESPIRATION RATE: 18 BRPM | BODY MASS INDEX: 31.85 KG/M2 | TEMPERATURE: 98 F | SYSTOLIC BLOOD PRESSURE: 120 MMHG | HEIGHT: 68 IN | HEART RATE: 90 BPM

## 2023-09-08 DIAGNOSIS — M79.645 FINGER PAIN, LEFT: Primary | ICD-10-CM

## 2023-09-08 DIAGNOSIS — S30.812A ABRASION OF PENIS, INITIAL ENCOUNTER: ICD-10-CM

## 2023-09-08 PROCEDURE — 3078F DIAST BP <80 MM HG: CPT | Mod: CPTII,S$GLB,, | Performed by: NURSE PRACTITIONER

## 2023-09-08 PROCEDURE — 99999 PR PBB SHADOW E&M-EST. PATIENT-LVL IV: CPT | Mod: PBBFAC,,, | Performed by: NURSE PRACTITIONER

## 2023-09-08 PROCEDURE — 3044F HG A1C LEVEL LT 7.0%: CPT | Mod: CPTII,S$GLB,, | Performed by: NURSE PRACTITIONER

## 2023-09-08 PROCEDURE — 99214 PR OFFICE/OUTPT VISIT, EST, LEVL IV, 30-39 MIN: ICD-10-PCS | Mod: S$GLB,,, | Performed by: NURSE PRACTITIONER

## 2023-09-08 PROCEDURE — 1160F RVW MEDS BY RX/DR IN RCRD: CPT | Mod: CPTII,S$GLB,, | Performed by: NURSE PRACTITIONER

## 2023-09-08 PROCEDURE — 3008F PR BODY MASS INDEX (BMI) DOCUMENTED: ICD-10-PCS | Mod: CPTII,S$GLB,, | Performed by: NURSE PRACTITIONER

## 2023-09-08 PROCEDURE — 3074F PR MOST RECENT SYSTOLIC BLOOD PRESSURE < 130 MM HG: ICD-10-PCS | Mod: CPTII,S$GLB,, | Performed by: NURSE PRACTITIONER

## 2023-09-08 PROCEDURE — 99999 PR PBB SHADOW E&M-EST. PATIENT-LVL IV: ICD-10-PCS | Mod: PBBFAC,,, | Performed by: NURSE PRACTITIONER

## 2023-09-08 PROCEDURE — 3078F PR MOST RECENT DIASTOLIC BLOOD PRESSURE < 80 MM HG: ICD-10-PCS | Mod: CPTII,S$GLB,, | Performed by: NURSE PRACTITIONER

## 2023-09-08 PROCEDURE — 3074F SYST BP LT 130 MM HG: CPT | Mod: CPTII,S$GLB,, | Performed by: NURSE PRACTITIONER

## 2023-09-08 PROCEDURE — 1160F PR REVIEW ALL MEDS BY PRESCRIBER/CLIN PHARMACIST DOCUMENTED: ICD-10-PCS | Mod: CPTII,S$GLB,, | Performed by: NURSE PRACTITIONER

## 2023-09-08 PROCEDURE — 3008F BODY MASS INDEX DOCD: CPT | Mod: CPTII,S$GLB,, | Performed by: NURSE PRACTITIONER

## 2023-09-08 PROCEDURE — 3044F PR MOST RECENT HEMOGLOBIN A1C LEVEL <7.0%: ICD-10-PCS | Mod: CPTII,S$GLB,, | Performed by: NURSE PRACTITIONER

## 2023-09-08 PROCEDURE — 1159F PR MEDICATION LIST DOCUMENTED IN MEDICAL RECORD: ICD-10-PCS | Mod: CPTII,S$GLB,, | Performed by: NURSE PRACTITIONER

## 2023-09-08 PROCEDURE — 99214 OFFICE O/P EST MOD 30 MIN: CPT | Mod: S$GLB,,, | Performed by: NURSE PRACTITIONER

## 2023-09-08 PROCEDURE — 1159F MED LIST DOCD IN RCRD: CPT | Mod: CPTII,S$GLB,, | Performed by: NURSE PRACTITIONER

## 2023-09-08 RX ORDER — MUPIROCIN 20 MG/G
OINTMENT TOPICAL DAILY
Qty: 15 G | Refills: 0 | Status: SHIPPED | OUTPATIENT
Start: 2023-09-08

## 2023-09-08 RX ORDER — KETOROLAC TROMETHAMINE 10 MG/1
10 TABLET, FILM COATED ORAL EVERY 6 HOURS
Qty: 20 TABLET | Refills: 2 | Status: SHIPPED | OUTPATIENT
Start: 2023-09-08 | End: 2023-09-13
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
## 2023-09-24 NOTE — TELEPHONE ENCOUNTER
No care due was identified.  [facility] Embedded Care Due Messages. Reference number: [id number].   9/24/2023 6:09:30 PM CDT

## 2023-09-25 RX ORDER — VALACYCLOVIR HYDROCHLORIDE 1 G/1
1000 TABLET, FILM COATED ORAL 2 TIMES DAILY
Qty: 180 TABLET | Refills: 1 | Status: SHIPPED | OUTPATIENT
Start: 2023-09-25

## 2023-09-25 NOTE — TELEPHONE ENCOUNTER
Refill Routing Note   Medication(s) are not appropriate for processing by [facility] for the following reason(s):      New or recently adjusted medication    [facility] action(s):  Defer Care Due:  None identified            Appointments  past 12m or future 3m with PCP    Date Provider   Last Visit   7/7/2023 Jet Jefferson MD   Next Visit   1/8/2024 Jet Jefferson MD   ED visits in past 90 days: 1        Note composed:2:48 PM 09/25/2023

## 2023-11-18 NOTE — DISCHARGE SUMMARY
Discharge Summary  Urology    Admit Date: 8/13/2020    Discharge Date: 08/13/2020    Discharge Attending Physician: Abraham Reaves MD     Diagnoses:  Active Hospital Problems    Diagnosis  POA    Balanitis [N48.1]  Yes      Resolved Hospital Problems   No resolved problems to display.       Hospital Course: This patient was seen and examined on the date of discharge and determined to be suitable for discharge.    Consults: None    Pertinent Diagnostic Studies and Procedures:  Circumcision, vasectomy    Discharged Condition: good    Disposition: Home or Self Care    Follow-up Plans:      Recent Labs:  No results found for this or any previous visit (from the past 336 hour(s)).  No results found for this or any previous visit (from the past 336 hour(s)).  Lab Results   Component Value Date    HGBA1C 5.5 01/09/2020       Discharge Medication List:   Sukumar Bob   Home Medication Instructions DESHAWN:[id number]    Printed on:08/13/20 0932   Medication Information                      ascorbic acid (VITAMIN C ORAL)  Take by mouth once daily.             bacitracin-polymyxin b (POLYSPORIN) ointment  Apply topically 2 (two) times daily.             cyclobenzaprine (FLEXERIL) 10 MG tablet               diclofenac sodium (VOLTAREN) 1 % Gel  Apply 2 g topically 4 (four) times daily as needed.             methocarbamol (ROBAXIN) 500 MG Tab  Take 1 tablet (500 mg total) by mouth 3 (three) times daily as needed (muscle spasms). May cause drowsiness.             methylPREDNISolone (MEDROL DOSEPACK) 4 mg tablet  use as directed             oxyCODONE-acetaminophen (PERCOCET) 5-325 mg per tablet  Take 1 tablet by mouth every 4 (four) hours as needed for Pain.             predniSONE (DELTASONE) 20 MG tablet               sulfamethoxazole-trimethoprim 800-160mg (BACTRIM DS) 800-160 mg Tab  Take 1 tablet by mouth 2 (two) times daily.             valACYclovir (VALTREX) 1000 MG tablet  Take 1 tablet (1,000 mg total) by  mouth 2 (two) times daily as needed.                   Discharge Procedure Orders   Diet Adult Regular     Notify your health care provider if you experience any of the following:  temperature >100.4     Notify your health care provider if you experience any of the following:  persistent nausea and vomiting or diarrhea     Notify your health care provider if you experience any of the following:  severe uncontrolled pain     Remove dressing in 24 hours   Order Comments: Begin using antibiotic ointment twice a day once the dressing is removed     Activity as tolerated       An excess of 30 minutes was spent discharging this patient.      0

## 2024-01-19 ENCOUNTER — LAB VISIT (OUTPATIENT)
Dept: LAB | Facility: HOSPITAL | Age: 44
End: 2024-01-19
Attending: REGISTERED NURSE
Payer: COMMERCIAL

## 2024-01-19 ENCOUNTER — OFFICE VISIT (OUTPATIENT)
Dept: FAMILY MEDICINE | Facility: CLINIC | Age: 44
End: 2024-01-19
Payer: COMMERCIAL

## 2024-01-19 VITALS
WEIGHT: 212.5 LBS | OXYGEN SATURATION: 97 % | TEMPERATURE: 98 F | SYSTOLIC BLOOD PRESSURE: 112 MMHG | BODY MASS INDEX: 32.21 KG/M2 | HEIGHT: 68 IN | HEART RATE: 78 BPM | DIASTOLIC BLOOD PRESSURE: 76 MMHG | RESPIRATION RATE: 18 BRPM

## 2024-01-19 DIAGNOSIS — R79.89 LOW TESTOSTERONE: ICD-10-CM

## 2024-01-19 DIAGNOSIS — E78.5 DYSLIPIDEMIA: ICD-10-CM

## 2024-01-19 DIAGNOSIS — Z00.00 PREVENTATIVE HEALTH CARE: ICD-10-CM

## 2024-01-19 DIAGNOSIS — D64.9 ANEMIA, UNSPECIFIED TYPE: ICD-10-CM

## 2024-01-19 DIAGNOSIS — E66.9 CLASS 1 OBESITY WITH SERIOUS COMORBIDITY AND BODY MASS INDEX (BMI) OF 32.0 TO 32.9 IN ADULT, UNSPECIFIED OBESITY TYPE: ICD-10-CM

## 2024-01-19 DIAGNOSIS — Z00.00 PREVENTATIVE HEALTH CARE: Primary | ICD-10-CM

## 2024-01-19 PROBLEM — E78.00 HYPERCHOLESTEROLEMIA: Status: RESOLVED | Noted: 2023-01-12 | Resolved: 2024-01-19

## 2024-01-19 PROBLEM — D50.9 IRON DEFICIENCY ANEMIA: Status: RESOLVED | Noted: 2023-01-12 | Resolved: 2024-01-19

## 2024-01-19 LAB
ALBUMIN SERPL BCP-MCNC: 4.3 G/DL (ref 3.5–5.2)
ALP SERPL-CCNC: 62 U/L (ref 55–135)
ALT SERPL W/O P-5'-P-CCNC: 16 U/L (ref 10–44)
ANION GAP SERPL CALC-SCNC: 8 MMOL/L (ref 8–16)
AST SERPL-CCNC: 23 U/L (ref 10–40)
BASOPHILS # BLD AUTO: 0.03 K/UL (ref 0–0.2)
BASOPHILS NFR BLD: 0.6 % (ref 0–1.9)
BILIRUB SERPL-MCNC: 1.2 MG/DL (ref 0.1–1)
BUN SERPL-MCNC: 18 MG/DL (ref 6–20)
CALCIUM SERPL-MCNC: 9.3 MG/DL (ref 8.7–10.5)
CHLORIDE SERPL-SCNC: 105 MMOL/L (ref 95–110)
CHOLEST SERPL-MCNC: 187 MG/DL (ref 120–199)
CHOLEST/HDLC SERPL: 3.7 {RATIO} (ref 2–5)
CO2 SERPL-SCNC: 25 MMOL/L (ref 23–29)
CREAT SERPL-MCNC: 1.3 MG/DL (ref 0.5–1.4)
DIFFERENTIAL METHOD BLD: ABNORMAL
EOSINOPHIL # BLD AUTO: 0.1 K/UL (ref 0–0.5)
EOSINOPHIL NFR BLD: 1.9 % (ref 0–8)
ERYTHROCYTE [DISTWIDTH] IN BLOOD BY AUTOMATED COUNT: 12.6 % (ref 11.5–14.5)
EST. GFR  (NO RACE VARIABLE): >60 ML/MIN/1.73 M^2
GLUCOSE SERPL-MCNC: 76 MG/DL (ref 70–110)
HCT VFR BLD AUTO: 43.8 % (ref 40–54)
HDLC SERPL-MCNC: 51 MG/DL (ref 40–75)
HDLC SERPL: 27.3 % (ref 20–50)
HGB BLD-MCNC: 13.7 G/DL (ref 14–18)
IMM GRANULOCYTES # BLD AUTO: 0.01 K/UL (ref 0–0.04)
IMM GRANULOCYTES NFR BLD AUTO: 0.2 % (ref 0–0.5)
LDLC SERPL CALC-MCNC: 118.8 MG/DL (ref 63–159)
LYMPHOCYTES # BLD AUTO: 2.6 K/UL (ref 1–4.8)
LYMPHOCYTES NFR BLD: 48.3 % (ref 18–48)
MCH RBC QN AUTO: 30.9 PG (ref 27–31)
MCHC RBC AUTO-ENTMCNC: 31.3 G/DL (ref 32–36)
MCV RBC AUTO: 99 FL (ref 82–98)
MONOCYTES # BLD AUTO: 0.7 K/UL (ref 0.3–1)
MONOCYTES NFR BLD: 12.6 % (ref 4–15)
NEUTROPHILS # BLD AUTO: 1.9 K/UL (ref 1.8–7.7)
NEUTROPHILS NFR BLD: 36.4 % (ref 38–73)
NONHDLC SERPL-MCNC: 136 MG/DL
NRBC BLD-RTO: 0 /100 WBC
PLATELET # BLD AUTO: 214 K/UL (ref 150–450)
PMV BLD AUTO: 12.9 FL (ref 9.2–12.9)
POTASSIUM SERPL-SCNC: 4 MMOL/L (ref 3.5–5.1)
PROT SERPL-MCNC: 7.4 G/DL (ref 6–8.4)
RBC # BLD AUTO: 4.44 M/UL (ref 4.6–6.2)
SODIUM SERPL-SCNC: 138 MMOL/L (ref 136–145)
TRIGL SERPL-MCNC: 86 MG/DL (ref 30–150)
TSH SERPL DL<=0.005 MIU/L-ACNC: 1.95 UIU/ML (ref 0.4–4)
WBC # BLD AUTO: 5.32 K/UL (ref 3.9–12.7)

## 2024-01-19 PROCEDURE — 3074F SYST BP LT 130 MM HG: CPT | Mod: CPTII,S$GLB,, | Performed by: REGISTERED NURSE

## 2024-01-19 PROCEDURE — 36415 COLL VENOUS BLD VENIPUNCTURE: CPT | Mod: PO | Performed by: REGISTERED NURSE

## 2024-01-19 PROCEDURE — 1159F MED LIST DOCD IN RCRD: CPT | Mod: CPTII,S$GLB,, | Performed by: REGISTERED NURSE

## 2024-01-19 PROCEDURE — 84443 ASSAY THYROID STIM HORMONE: CPT | Performed by: REGISTERED NURSE

## 2024-01-19 PROCEDURE — 85025 COMPLETE CBC W/AUTO DIFF WBC: CPT | Performed by: REGISTERED NURSE

## 2024-01-19 PROCEDURE — 99396 PREV VISIT EST AGE 40-64: CPT | Mod: S$GLB,,, | Performed by: REGISTERED NURSE

## 2024-01-19 PROCEDURE — 3008F BODY MASS INDEX DOCD: CPT | Mod: CPTII,S$GLB,, | Performed by: REGISTERED NURSE

## 2024-01-19 PROCEDURE — 3078F DIAST BP <80 MM HG: CPT | Mod: CPTII,S$GLB,, | Performed by: REGISTERED NURSE

## 2024-01-19 PROCEDURE — 99999 PR PBB SHADOW E&M-EST. PATIENT-LVL IV: CPT | Mod: PBBFAC,,, | Performed by: REGISTERED NURSE

## 2024-01-19 PROCEDURE — 80061 LIPID PANEL: CPT | Performed by: REGISTERED NURSE

## 2024-01-19 PROCEDURE — 80053 COMPREHEN METABOLIC PANEL: CPT | Performed by: REGISTERED NURSE

## 2024-01-19 NOTE — PROGRESS NOTES
OBJECTIVE:     Sukumar Bob is a 43 y.o. male here today for:   PREVENTIVE WELLNESS EXAM      HPI:    Sukumar Bob has fasted to have bloodwork done today.  I have reviewed the patient's medical history in detail and updated the computerized patient record.  History obtained from the patient.    HEALTHCARE MAINTENANCE:    COMPLETED:  Health Maintenance Topics with due status: Not Due       Topic Last Completion Date    TETANUS VACCINE 01/02/2018    Hemoglobin A1c (Diabetic Prevention Screening) 01/12/2023    Lipid Panel 01/12/2023       DUE:  Health Maintenance Due   Topic Date Due    Influenza Vaccine (1) Never done    COVID-19 Vaccine (3 - 2023-24 season) 09/01/2023         REVIEW OF SYSTEMS:  Review of Systems   Constitutional:  Negative for activity change, appetite change, chills, diaphoresis, fatigue, fever and unexpected weight change.        Wt Readings from Last 3 Encounters:  01/19/24 0811 : 96.4 kg (212 lb 8.4 oz)  09/08/23 1350 : 95.3 kg (210 lb 1.6 oz)  07/07/23 1103 : 94.8 kg (208 lb 15.9 oz)  Weight stable, does ex regularly.  Taking [place].   HENT:  Negative for congestion, facial swelling, hearing loss, mouth sores, nosebleeds, postnasal drip, rhinorrhea, sinus pressure, sore throat, trouble swallowing and voice change.    Eyes:  Negative for photophobia, pain and visual disturbance.   Respiratory:  Negative for cough, chest tightness, shortness of breath and wheezing.    Cardiovascular:  Negative for chest pain, palpitations and leg swelling.   Gastrointestinal:  Negative for abdominal pain, blood in stool, constipation, diarrhea, nausea, rectal pain and vomiting.   Genitourinary:  Negative for decreased urine volume, difficulty urinating, dysuria, flank pain, frequency, hematuria and urgency.   Musculoskeletal:  Negative for arthralgias, back pain, gait problem, joint swelling, myalgias, neck pain and neck stiffness.   Skin:  Negative for color change, pallor, rash and  wound.   Neurological:  Negative for dizziness, syncope, facial asymmetry, speech difficulty, weakness, light-headedness, numbness and headaches.   Hematological:  Negative for adenopathy. Does not bruise/bleed easily.   Psychiatric/Behavioral:  Positive for sleep disturbance (variable).          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    Epididymoorchitis    Class 1 obesity with serious comorbidity and body mass index (BMI) of 31.0 to 31.9 in adult    Hyperuricemia    Dyslipidemia    Anemia         CURRENT MEDICATIONS:    Current Outpatient Medications:     mupirocin (BACTROBAN) 2 % ointment, Apply topically once daily., Disp: 15 g, Rfl: 0    valACYclovir (VALTREX) 1000 MG tablet, TAKE 1 TABLET BY MOUTH TWICE A DAY, Disp: 180 tablet, Rfl: 1      HISTORY:    PAST MEDICAL HISTORY:  Past Medical History:   Diagnosis Date    Epididymoorchitis 9/25/2020    HSV (herpes simplex virus) infection     Mild chronic anemia        PAST SURGICAL HISTORY:  Past Surgical History:   Procedure Laterality Date    CIRCUMCISION N/A 08/13/2020    Procedure: CIRCUMCISION;  Surgeon: Abraham Reaves MD;  Location: [facility] OR;  Service: Urology;  Laterality: N/A;    VASECTOMY Bilateral 08/13/2020    Procedure: VASECTOMY;  Surgeon: Abraham Reaves MD;  Location: [facility] OR;  Service: Urology;  Laterality: Bilateral;       FAMILY HISTORY:  Family History   Problem Relation Age of Onset    No Known Problems Mother     No Known Problems Father     No Known Problems Sister     No Known Problems Sister     No Known Problems Brother     No Known Problems Brother     Hypertension Maternal Grandmother     Stroke Maternal Grandmother     No Known Problems Son     No Known Problems Son     Throat cancer Cousin     Diabetes Neg  "Hx     Heart disease Neg Hx     Thyroid disease Neg Hx        SOCIAL HISTORY:  Social History     Socioeconomic History    Marital status:     Number of children: 2   Occupational History    Occupation:      Comment: [employer]   Tobacco Use    Smoking status: Never    Smokeless tobacco: Never   Substance and Sexual Activity    Alcohol use: Yes     Comment: socially    Drug use: No    Sexual activity: Yes     Partners: Female     Birth control/protection: Surgical     Comment: vasectomy     Social Determinants of Health     Physical Activity: Sufficiently Active (1/19/2024)    Exercise Vital Sign     Days of Exercise per Week: 4 days     Minutes of Exercise per Session: 60 min         OBJECTIVE:     VITAL SIGNS:  Vitals:    01/19/24 0811   BP: 112/76   Pulse: 78   Resp: 18   Temp: 98 °F (36.7 °C)   TempSrc: Oral   SpO2: 97%   Weight: 96.4 kg (212 lb 8.4 oz)   Height: 5' 8" (1.727 m)       CURRENT BMI:   Body mass index is 32.31 kg/m².    PHYSICAL EXAM:  Physical Exam  Constitutional:       General: He is not in acute distress.     Appearance: He is well-developed. He is not diaphoretic.   HENT:      Head: Normocephalic and atraumatic.      Right Ear: Tympanic membrane, ear canal and external ear normal. There is impacted cerumen.      Left Ear: Tympanic membrane, ear canal and external ear normal. There is impacted cerumen.      Ears:      Comments: Partial cerumen impaction on both sides, easily removed using curette & q-tip.     Nose: Nose normal. No congestion or rhinorrhea.      Mouth/Throat:      Mouth: Mucous membranes are moist.      Pharynx: Oropharynx is clear. No oropharyngeal exudate or posterior oropharyngeal erythema.   Eyes:      General: No scleral icterus.        Right eye: No discharge.         Left eye: No discharge.      Conjunctiva/sclera: Conjunctivae normal.      Pupils: Pupils are equal, round, and reactive to light.   Neck:      Thyroid: No thyromegaly.      " Vascular: No JVD.      Trachea: No tracheal deviation.   Cardiovascular:      Rate and Rhythm: Normal rate and regular rhythm.      Pulses: Normal pulses.      Heart sounds: Normal heart sounds. No murmur heard.     No friction rub. No gallop.   Pulmonary:      Effort: Pulmonary effort is normal. No respiratory distress.      Breath sounds: Normal breath sounds. No wheezing.   Chest:      Chest wall: No tenderness.   Abdominal:      General: Bowel sounds are normal. There is no distension.      Palpations: Abdomen is soft. There is no mass.      Tenderness: There is no abdominal tenderness.   Musculoskeletal:         General: No swelling, tenderness or deformity. Normal range of motion.      Cervical back: Normal range of motion and neck supple.   Lymphadenopathy:      Cervical: No cervical adenopathy.   Skin:     General: Skin is warm and dry.      Capillary Refill: Capillary refill takes less than 2 seconds.      Findings: No erythema or rash.   Neurological:      Mental Status: He is alert and oriented to person, place, and time.      Sensory: No sensory deficit.      Motor: No weakness or abnormal muscle tone.      Coordination: Coordination normal.      Gait: Gait normal.   Psychiatric:         Mood and Affect: Mood normal.         Behavior: Behavior normal.         Thought Content: Thought content normal.         Judgment: Judgment normal.         ~~~~~~~~~~~~~~~~~~~~~~~~~~~~~~~~~~~~~~~~~~    REVIEWED LABS:    HEMATOLOGY:    Lab Results   Component Value Date    WBC 4.15 03/17/2023    RBC 4.28 (L) 03/17/2023    HGB 13.1 (L) 03/17/2023    HCT 39.5 (L) 03/17/2023    MCV 92 03/17/2023    MCH 30.6 03/17/2023    MCHC 33.2 03/17/2023    RDW 12.9 03/17/2023     03/17/2023    MPV 11.6 03/17/2023    GRAN 1.6 (L) 03/17/2023    GRAN 38.6 03/17/2023    LYMPH 1.9 03/17/2023    LYMPH 45.3 03/17/2023    MONO 0.6 03/17/2023    MONO 14.2 03/17/2023    EOS 0.1 03/17/2023    BASO 0.03 03/17/2023    EOSINOPHIL 1.2  "03/17/2023    BASOPHIL 0.7 03/17/2023       CHEMISTRY:    Lab Results   Component Value Date     01/12/2023    K 4.5 01/12/2023     01/12/2023    CO2 22 (L) 01/12/2023    ANIONGAP 11 01/12/2023     Lab Results   Component Value Date    CALCIUM 9.5 01/12/2023    PHOS 3.7 08/28/2020       LIPID PROFILE:    Lab Results   Component Value Date    CHOL 178 01/12/2023     Lab Results   Component Value Date    HDL 53 01/12/2023     Lab Results   Component Value Date    LDLCALC 109.8 01/12/2023     Lab Results   Component Value Date    TRIG 76 01/12/2023       RENAL:    Lab Results   Component Value Date    CREATININE 1.2 01/12/2023    BUN 17 01/12/2023     eGFR   Date Value Ref Range Status   01/12/2023 >60.0 >60 mL/min/1.73 m^2 Final       Microalb/Creat Ratio   Date Value Ref Range Status   02/20/2015 1.9 0.0 - 30.0 ug/mg Final       Lab Results   Component Value Date    URICACID 7.0 01/12/2023       LIVER:    Lab Results   Component Value Date    ALT 15 03/17/2023    AST 20 03/17/2023    ALKPHOS 60 03/17/2023    BILITOT 1.3 (H) 03/17/2023       THYROID:    Lab Results   Component Value Date    TSH 0.802 01/12/2023    FREET4 1.00 01/06/2016       DIABETES SCREENING:    Glucose   Date Value Ref Range Status   01/12/2023 84 70 - 110 mg/dL Final     No results found for: "LABINSU"  Lab Results   Component Value Date    HGBA1C 5.5 01/12/2023       MALE:    Lab Results   Component Value Date    PSA 0.48 01/12/2023    PSA 0.40 01/07/2022    PSA 0.44 02/20/2015     Lab Results   Component Value Date    TESTOSTERONE 300 03/17/2023    TESTOSTERONE 66.2 03/17/2023         ASSESSMENT:     1. Preventative health care  -     CBC Auto Differential; Future; Expected date: 01/19/2024  -     Comprehensive Metabolic Panel; Future; Expected date: 01/19/2024  -     TSH; Future; Expected date: 01/19/2024  -     Lipid Panel; Future; Expected date: 01/19/2024    2. Dyslipidemia  -     Lipid Panel; Future; Expected date: " 01/19/2024    3. Anemia, unspecified type  -     CBC Auto Differential; Future; Expected date: 01/19/2024    4. Low testosterone ---- followed by Urology    5. Class 1 obesity with serious comorbidity and body mass index (BMI) of 32.0 to 32.9 in adult, unspecified obesity type  -     CBC Auto Differential; Future; Expected date: 01/19/2024  -     Comprehensive Metabolic Panel; Future; Expected date: 01/19/2024  -     TSH; Future; Expected date: 01/19/2024  -     Lipid Panel; Future; Expected date: 01/19/2024      PLAN:     Healthy dietary and lifestyle changes discussed.  Work wellness form completed to indicate annual done today.  Declines vaccines.    FOLLOW-UP:     Pending lab.  RTC as directed and/or prn.        VALDO Napier  [facility]       Patient Care Team:  Jet Jefferson MD as PCP - General (Internal Medicine)  Abraham Reaves MD as Consulting Physician (Urology)  Augustine Hopkins MD as Consulting Physician (Pain Medicine)

## 2024-03-01 ENCOUNTER — OFFICE VISIT (OUTPATIENT)
Dept: SPORTS MEDICINE | Facility: CLINIC | Age: 44
End: 2024-03-01
Payer: COMMERCIAL

## 2024-03-01 VITALS — HEIGHT: 68 IN | BODY MASS INDEX: 32.13 KG/M2 | RESPIRATION RATE: 17 BRPM | WEIGHT: 212 LBS

## 2024-03-01 DIAGNOSIS — S43.432A TEAR OF LEFT GLENOID LABRUM, INITIAL ENCOUNTER: Primary | ICD-10-CM

## 2024-03-01 DIAGNOSIS — G89.29 CHRONIC LEFT SHOULDER PAIN: ICD-10-CM

## 2024-03-01 DIAGNOSIS — M25.512 CHRONIC LEFT SHOULDER PAIN: ICD-10-CM

## 2024-03-01 PROCEDURE — 1159F MED LIST DOCD IN RCRD: CPT | Mod: CPTII,S$GLB,, | Performed by: STUDENT IN AN ORGANIZED HEALTH CARE EDUCATION/TRAINING PROGRAM

## 2024-03-01 PROCEDURE — 3008F BODY MASS INDEX DOCD: CPT | Mod: CPTII,S$GLB,, | Performed by: STUDENT IN AN ORGANIZED HEALTH CARE EDUCATION/TRAINING PROGRAM

## 2024-03-01 PROCEDURE — 1160F RVW MEDS BY RX/DR IN RCRD: CPT | Mod: CPTII,S$GLB,, | Performed by: STUDENT IN AN ORGANIZED HEALTH CARE EDUCATION/TRAINING PROGRAM

## 2024-03-01 PROCEDURE — 99999 PR PBB SHADOW E&M-EST. PATIENT-LVL III: CPT | Mod: PBBFAC,,, | Performed by: STUDENT IN AN ORGANIZED HEALTH CARE EDUCATION/TRAINING PROGRAM

## 2024-03-01 PROCEDURE — 20610 DRAIN/INJ JOINT/BURSA W/O US: CPT | Mod: LT,S$GLB,, | Performed by: STUDENT IN AN ORGANIZED HEALTH CARE EDUCATION/TRAINING PROGRAM

## 2024-03-01 PROCEDURE — 99213 OFFICE O/P EST LOW 20 MIN: CPT | Mod: 25,S$GLB,, | Performed by: STUDENT IN AN ORGANIZED HEALTH CARE EDUCATION/TRAINING PROGRAM

## 2024-03-01 RX ORDER — TRIAMCINOLONE ACETONIDE 40 MG/ML
40 INJECTION, SUSPENSION INTRA-ARTICULAR; INTRAMUSCULAR
Status: DISCONTINUED | OUTPATIENT
Start: 2024-03-01 | End: 2024-03-01 | Stop reason: HOSPADM

## 2024-03-01 RX ADMIN — TRIAMCINOLONE ACETONIDE 40 MG: 40 INJECTION, SUSPENSION INTRA-ARTICULAR; INTRAMUSCULAR at 09:03

## 2024-03-01 NOTE — PROCEDURES
Large Joint Aspiration/Injection: L glenohumeral    Date/Time: 3/1/2024 9:15 AM    Performed by: Jet Villeda MD  Authorized by: Jet Villeda MD    Consent Done?:  Yes (Verbal)  Indications:  Pain  Site marked: the procedure site was marked    Timeout: prior to procedure the correct patient, procedure, and site was verified    Prep: patient was prepped and draped in usual sterile fashion      Local anesthesia used?: Yes    Anesthesia:  Local infiltration  Local anesthetic:  Lidocaine 1% without epinephrine    Details:  Needle Size:  22 G  Ultrasonic Guidance for needle placement?: No    Approach:  Posterior  Location:  Shoulder  Site:  L glenohumeral  Medications:  40 mg triamcinolone acetonide 40 mg/mL  Patient tolerance:  Patient tolerated the procedure well with no immediate complications

## 2024-03-01 NOTE — PROGRESS NOTES
Orthopaedics Sports Medicine     Shoulder Initial Visit         3/1/2024    Referring MD: Self, Aaareferral    Chief Complaint   Patient presents with    Left Shoulder - Pain         History of Present Illness:   Sukumar Bob is a 43 y.o. right-hand dominant male who presents with left shoulder pain and dysfunction.    Onset of the symptoms was several years ago, worsening recently.      Inciting event: Patient reports symptoms started after picking up a heavy object off of scaffolding and has had pain since then.     Current symptoms include chronic left shoulder pain. Denies instability or neurological symptoms. He rates his pain a 7/10 today.      Pain is aggravated by certain movements, especially overhead.       Evaluation to date: X-Ray,      Treatment to date: Rest, activity modification, multiple CSIs. Good relief with CSI in the past.     Past Medical History:   Past Medical History:   Diagnosis Date    Epididymoorchitis 9/25/2020    HSV (herpes simplex virus) infection     Mild chronic anemia        Past Surgical History:   Past Surgical History:   Procedure Laterality Date    CIRCUMCISION N/A 08/13/2020    Procedure: CIRCUMCISION;  Surgeon: Abraham Reaves MD;  Location: [facility] OR;  Service: Urology;  Laterality: N/A;    VASECTOMY Bilateral 08/13/2020    Procedure: VASECTOMY;  Surgeon: Abraham Reaves MD;  Location: [facility] OR;  Service: Urology;  Laterality: Bilateral;       Medications:  Patient's Medications   New Prescriptions    No medications on file   Previous Medications    MUPIROCIN (BACTROBAN) 2 % OINTMENT    Apply topically once daily.    VALACYCLOVIR (VALTREX) 1000 MG TABLET    TAKE 1 TABLET BY MOUTH TWICE A DAY   Modified Medications    No medications on file   Discontinued Medications    No medications on file       Allergies: Review of patient's allergies indicates:  No Known Allergies    Social History:   Home town: [place], [place]  Occupation: [employer]  Alcohol use: He  "reports current alcohol use.  Tobacco use: He reports that he has never smoked. He has never used smokeless tobacco.    Review of systems:  History of recent illness, fevers, shakes, or chills: no  History of cardiac problems or chest pain: no  History of pulmonary problems or asthma: no  History of diabetes: no  History of prior dvt or clotting problems: no  History of sleep apnea: no      Physical Examination:  Estimated body mass index is 32.23 kg/m² as calculated from the following:    Height as of this encounter: 5' 8" (1.727 m).    Weight as of this encounter: 96.2 kg (212 lb).    General  Healthy appearing male in no acute distress  Alert and oriented, normal mood, appropriate affect    Shoulder Examination:  Patient is alert and oriented, no distress. Skin is intact. Neuro is normal with no focal motor or sensory findings.    Cervical exam is unremarkable. Intact cervical ROM. Negative Spurling's test    Physical Exam:  RIGHT    LEFT    Scap Dyskinesis/Winging (-)    (-)    Tenderness:          Greater Tuberosity             (-)    (-)  Bicipital Groove  (-)    +  AC joint   (-)    (-)  Other:     ROM:  Forward Elevation 180    180  Abduction  120    120  ER (at side)  80    80  IR   T8    T8    Strength:   Supraspinatus  5/5    5/5  Infraspinatus  5/5    5/5  Subscap / IR  5/5    5/5     Special Tests:   Apprehension:   (-)    *pain   Thea Relocation:  (-)    (-)   Jerk / Posterior Load:  (-)    +   Neer:    (-)    (-)   [place]verde:   (-)    (-)   SS Stress:   (-)    (-)   Bear Hug:   (-)    (-)   [place]'s:   (-)    +   Resisted Thrower's:   (-)    +   Cross Arm Abduction:  (-)    (-)    Neurovascular examination  - Motor grossly intact bilaterally to shoulder abduction, elbow flexion and extension, wrist flexion and extension, and intrinsic hand musculature  - Sensation intact to light touch bilaterally in axillary, median, radial, and ulnar distributions  - Symmetrical radial pulses      Imaging:  XR " Results:  Results for orders placed during the hospital encounter of 07/07/23    X-Ray Shoulder 2 or More Views Left    Narrative  EXAMINATION:  XR SHOULDER COMPLETE 2 OR MORE VIEWS LEFT    CLINICAL HISTORY:  Pain in right shoulder    TECHNIQUE:  Two or three views of the left shoulder were performed.    COMPARISON:  03/28/2022    FINDINGS:  No acute abnormality or significant change.  Similar AC joint degenerative findings.  Lung parenchyma clear.    Impression  As above      Electronically signed by: Arden Fischer MD  Date:    07/07/2023  Time:    12:56      MRI Results:  No results found for this or any previous visit.      CT Results:  No results found for this or any previous visit.      Physician Read: I agree with the above impression.      Impression:  43 y.o. male with chronic left shoulder pain, suspected labral tear      Plan:  Discussed diagnosis and treatment options with patient today. He has had chronic left shoulder pain for years. His symptoms and physical exam are most consistent with glenoid labrum tear.  He reports he has had multiple CSIs in the past with good relief. Due to the chronic nature of his pain, I recommend we move forward with MRI at this time for further evaluation, specifically labrum tear.  Patient also requests something for his pain at this time, specifically requests injection since he has had good results in the past. We discussed that performing injection at this time will delay any potential surgical intervention if it is warranted by 3 months. Patient voices understanding and agrees to proceed with injection as he just started a new job and would not move forward with surgery immediately anyway.   Intra-articular corticosteroid injection into the left glenohumeral joint performed today and patient tolerated the procedure well with no immediate complications.   Follow up with me after           Jet Villeda MD    I, Umang Angel, acted as a scribe for  Jet Villeda MD for the duration of this office visit.

## 2024-03-08 ENCOUNTER — HOSPITAL ENCOUNTER (OUTPATIENT)
Dept: RADIOLOGY | Facility: HOSPITAL | Age: 44
Discharge: HOME | End: 2024-03-08
Attending: STUDENT IN AN ORGANIZED HEALTH CARE EDUCATION/TRAINING PROGRAM
Payer: COMMERCIAL

## 2024-03-08 DIAGNOSIS — M25.512 CHRONIC LEFT SHOULDER PAIN: ICD-10-CM

## 2024-03-08 DIAGNOSIS — G89.29 CHRONIC LEFT SHOULDER PAIN: ICD-10-CM

## 2024-03-08 PROCEDURE — 73221 MRI JOINT UPR EXTREM W/O DYE: CPT | Mod: 26,LT,, | Performed by: RADIOLOGY

## 2024-03-08 PROCEDURE — 73221 MRI JOINT UPR EXTREM W/O DYE: CPT | Mod: TC,LT

## 2024-05-31 ENCOUNTER — OFFICE VISIT (OUTPATIENT)
Dept: FAMILY MEDICINE | Facility: CLINIC | Age: 44
End: 2024-05-31
Payer: COMMERCIAL

## 2024-05-31 ENCOUNTER — HOSPITAL ENCOUNTER (OUTPATIENT)
Dept: RADIOLOGY | Facility: HOSPITAL | Age: 44
Discharge: HOME | End: 2024-05-31
Attending: NURSE PRACTITIONER
Payer: COMMERCIAL

## 2024-05-31 VITALS
RESPIRATION RATE: 18 BRPM | BODY MASS INDEX: 29.7 KG/M2 | HEIGHT: 68 IN | DIASTOLIC BLOOD PRESSURE: 78 MMHG | SYSTOLIC BLOOD PRESSURE: 116 MMHG | WEIGHT: 196 LBS | TEMPERATURE: 99 F | HEART RATE: 60 BPM | OXYGEN SATURATION: 98 %

## 2024-05-31 DIAGNOSIS — M54.50 ACUTE RIGHT-SIDED LOW BACK PAIN WITHOUT SCIATICA: Primary | ICD-10-CM

## 2024-05-31 DIAGNOSIS — T14.8XXA MUSCLE STRAIN: ICD-10-CM

## 2024-05-31 DIAGNOSIS — M54.50 ACUTE RIGHT-SIDED LOW BACK PAIN WITHOUT SCIATICA: ICD-10-CM

## 2024-05-31 PROBLEM — N48.1 BALANITIS: Status: RESOLVED | Noted: 2020-07-29 | Resolved: 2024-05-31

## 2024-05-31 PROCEDURE — 1160F RVW MEDS BY RX/DR IN RCRD: CPT | Mod: CPTII,S$GLB,, | Performed by: NURSE PRACTITIONER

## 2024-05-31 PROCEDURE — 3074F SYST BP LT 130 MM HG: CPT | Mod: CPTII,S$GLB,, | Performed by: NURSE PRACTITIONER

## 2024-05-31 PROCEDURE — 99999 PR PBB SHADOW E&M-EST. PATIENT-LVL IV: CPT | Mod: PBBFAC,,, | Performed by: NURSE PRACTITIONER

## 2024-05-31 PROCEDURE — 72100 X-RAY EXAM L-S SPINE 2/3 VWS: CPT | Mod: TC,FY,PO

## 2024-05-31 PROCEDURE — 3078F DIAST BP <80 MM HG: CPT | Mod: CPTII,S$GLB,, | Performed by: NURSE PRACTITIONER

## 2024-05-31 PROCEDURE — 72100 X-RAY EXAM L-S SPINE 2/3 VWS: CPT | Mod: 26,,, | Performed by: RADIOLOGY

## 2024-05-31 PROCEDURE — 99214 OFFICE O/P EST MOD 30 MIN: CPT | Mod: S$GLB,,, | Performed by: NURSE PRACTITIONER

## 2024-05-31 PROCEDURE — 3008F BODY MASS INDEX DOCD: CPT | Mod: CPTII,S$GLB,, | Performed by: NURSE PRACTITIONER

## 2024-05-31 PROCEDURE — 1159F MED LIST DOCD IN RCRD: CPT | Mod: CPTII,S$GLB,, | Performed by: NURSE PRACTITIONER

## 2024-05-31 RX ORDER — CYCLOBENZAPRINE HCL 10 MG
10 TABLET ORAL NIGHTLY
Qty: 20 TABLET | Refills: 0 | Status: SHIPPED | OUTPATIENT
Start: 2024-05-31 | End: 2024-06-20

## 2024-05-31 RX ORDER — KETOROLAC TROMETHAMINE 10 MG/1
10 TABLET, FILM COATED ORAL EVERY 6 HOURS PRN
COMMUNITY
Start: 2024-05-11

## 2024-05-31 RX ORDER — TRAMADOL HYDROCHLORIDE 50 MG/1
50 TABLET ORAL EVERY 6 HOURS
Qty: 10 EACH | Refills: 0 | Status: SHIPPED | OUTPATIENT
Start: 2024-05-31

## 2024-05-31 RX ORDER — IBUPROFEN 800 MG/1
800 TABLET ORAL 3 TIMES DAILY
Qty: 90 TABLET | Refills: 3 | Status: SHIPPED | OUTPATIENT
Start: 2024-05-31

## 2024-05-31 RX ORDER — METHOCARBAMOL 750 MG/1
750 TABLET, FILM COATED ORAL
COMMUNITY
Start: 2024-05-11 | End: 2024-05-31

## 2024-05-31 NOTE — PROGRESS NOTES
Sukumar Bob  05/31/2024  [id number]    Jet Jefferson MD  Patient Care Team:  Jet Jefferson MD as PCP - General (Internal Medicine)  Abraham Reaves MD as Consulting Physician (Urology)  Augustine Hopkins MD as Consulting Physician (Pain Medicine)          Visit Type:a scheduled routine follow-up visit    Chief Complaint:  No chief complaint on file.      History of Present Illness:     44 year old male presents with complaint of continued left flank pain for a month. Denies dysuria, hematuria, known injury or trauma.   No medication refills being requested at this time.  No other complaints at this time.      History:  Past Medical History:   Diagnosis Date    Balanitis 07/29/2020    Epididymoorchitis 09/25/2020    HSV (herpes simplex virus) infection     Mild chronic anemia      Past Surgical History:   Procedure Laterality Date    CIRCUMCISION N/A 08/13/2020    Procedure: CIRCUMCISION;  Surgeon: Abraham Reaves MD;  Location: [facility] OR;  Service: Urology;  Laterality: N/A;    VASECTOMY Bilateral 08/13/2020    Procedure: VASECTOMY;  Surgeon: Abraham Reaves MD;  Location: [facility] OR;  Service: Urology;  Laterality: Bilateral;     Family History   Problem Relation Name Age of Onset    No Known Problems Mother      No Known Problems Father      No Known Problems Sister      No Known Problems Sister      No Known Problems Brother      No Known Problems Brother      Hypertension Maternal Grandmother      Stroke Maternal Grandmother      No Known Problems Son      No Known Problems Son      Throat cancer Cousin      Diabetes Neg Hx      Heart disease Neg Hx      Thyroid disease Neg Hx       Social History     Socioeconomic History    Marital status:     Number of children: 2   Occupational History    Occupation:      Comment: [employer]   Tobacco Use    Smoking status: Never    Smokeless tobacco: Never   Substance  and Sexual Activity    Alcohol use: Yes     Comment: socially    Drug use: No    Sexual activity: Yes     Partners: Female     Birth control/protection: Surgical     Comment: vasectomy   Social History Narrative    He wears seatbelt. He is expecting a daughter soon.     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    Medications:  Current Outpatient Medications on File Prior to Visit   Medication Sig Dispense Refill    valACYclovir (VALTREX) 1000 MG tablet TAKE 1 TABLET BY MOUTH TWICE A  tablet 1    ketorolac (TORADOL) 10 mg tablet Take 10 mg by mouth every 6 (six) hours as needed. (Patient not taking: Reported on 5/31/2024)      mupirocin (BACTROBAN) 2 % ointment Apply topically once daily. (Patient not taking: Reported on 5/31/2024) 15 g 0    [DISCONTINUED] methocarbamoL (ROBAXIN) 750 MG Tab Take 750 mg by mouth. (Patient not taking: Reported on 5/31/2024)       No current facility-administered medications on file prior to visit.       Medications have been reviewed and reconciled with patient at this visit.  Barriers to medications reviewed with patient.    Adverse reactions to current medications reviewed with patient..    Over the counter medications reviewed and reconciled with patient.    Exam:  Wt Readings from Last 3  Encounters:   05/31/24 88.9 kg (195 lb 15.8 oz)   03/01/24 96.2 kg (212 lb)   01/19/24 96.4 kg (212 lb 8.4 oz)     Temp Readings from Last 3 Encounters:   05/31/24 98.6 °F (37 °C) (Tympanic)   01/19/24 98 °F (36.7 °C) (Oral)   09/08/23 98.2 °F (36.8 °C) (Tympanic)     BP Readings from Last 3 Encounters:   05/31/24 116/78   01/19/24 112/76   09/08/23 120/78     Pulse Readings from Last 3 Encounters:   05/31/24 60   01/19/24 78   09/08/23 90     Body mass index is 29.8 kg/m².      Review of Systems   Constitutional:  Negative for fever.   Respiratory:  Negative for cough, shortness of breath and wheezing.    Cardiovascular:  Negative for chest pain and palpitations.   Gastrointestinal:  Negative for nausea.   Musculoskeletal:  Positive for back pain and myalgias.   Neurological:  Negative for speech change, weakness and headaches.   All other systems reviewed and are negative.    Physical Exam  Vitals and nursing note reviewed.   Constitutional:       Appearance: Normal appearance. He is normal weight.   HENT:      Head: Normocephalic and atraumatic.      Right Ear: Tympanic membrane, ear canal and external ear normal.      Left Ear: Tympanic membrane, ear canal and external ear normal.      Nose: Nose normal.      Mouth/Throat:      Mouth: Mucous membranes are moist.      Pharynx: Oropharynx is clear.   Eyes:      Extraocular Movements: Extraocular movements intact.      Conjunctiva/sclera: Conjunctivae normal.      Pupils: Pupils are equal, round, and reactive to light.   Cardiovascular:      Rate and Rhythm: Normal rate and regular rhythm.      Pulses: Normal pulses.      Heart sounds: Normal heart sounds.   Pulmonary:      Effort: Pulmonary effort is normal.      Breath sounds: Normal breath sounds.   Abdominal:      General: Bowel sounds are normal.      Palpations: Abdomen is soft.   Musculoskeletal:         General: Normal range of motion.        Arms:       Cervical back: Normal range of motion and neck  supple.   Skin:     General: Skin is warm and dry.      Capillary Refill: Capillary refill takes less than 2 seconds.   Neurological:      General: No focal deficit present.      Mental Status: He is alert and oriented to person, place, and time.   Psychiatric:         Mood and Affect: Mood normal.         Behavior: Behavior normal.         Thought Content: Thought content normal.         Judgment: Judgment normal.       Laboratory Reviewed ({Yes)  Lab Results   Component Value Date    WBC 5.32 01/19/2024    HGB 13.7 (L) 01/19/2024    HCT 43.8 01/19/2024     01/19/2024    CHOL 187 01/19/2024    TRIG 86 01/19/2024    HDL 51 01/19/2024    ALT 16 01/19/2024    AST 23 01/19/2024     01/19/2024    K 4.0 01/19/2024     01/19/2024    CREATININE 1.3 01/19/2024    BUN 18 01/19/2024    CO2 25 01/19/2024    TSH 1.949 01/19/2024    PSA 0.48 01/12/2023    INR 1.0 01/16/2021    HGBA1C 5.5 01/12/2023       Diagnoses and all orders for this visit:    Acute right-sided low back pain without sciatica  -     X-Ray Lumbar Spine AP And Lateral; Future  -     traMADoL (ULTRAM) 50 mg tablet; Take 1 tablet (50 mg total) by mouth every 6 (six) hours.  -     ibuprofen (ADVIL,MOTRIN) 800 MG tablet; Take 1 tablet (800 mg total) by mouth 3 (three) times daily.  -     cyclobenzaprine (FLEXERIL) 10 MG tablet; Take 1 tablet (10 mg total) by mouth every evening. for 20 days    Muscle strain        Plan   Xray lumbar spine   Start medications prescribed today         Care Plan/Goals: Reviewed    Goals    None     Diagnoses and all orders for this visit:    Acute right-sided low back pain without sciatica  -     X-Ray Lumbar Spine AP And Lateral; Future  -     traMADoL (ULTRAM) 50 mg tablet; Take 1 tablet (50 mg total) by mouth every 6 (six) hours.  -     ibuprofen (ADVIL,MOTRIN) 800 MG tablet; Take 1 tablet (800 mg total) by mouth 3 (three) times daily.  -     cyclobenzaprine (FLEXERIL) 10 MG tablet; Take 1 tablet (10 mg total) by  mouth every evening. for 20 days    Muscle strain         Follow up: Follow up for follow up 6 months with Dr. Jefferson.    After visit summary was printed and given to patient upon discharge today.  Patient goals and care plan are included in After Visit Summary.

## 2024-06-06 ENCOUNTER — TELEPHONE (OUTPATIENT)
Dept: FAMILY MEDICINE | Facility: CLINIC | Age: 44
End: 2024-06-06
Payer: COMMERCIAL

## 2024-06-06 NOTE — TELEPHONE ENCOUNTER
----- Message from Yoni Neely sent at 6/5/2024 12:37 PM CDT -----  Regarding: pt labs    Test Result:XRAY      Who Called: n/a       Name of Test:        Would the patient rather a call back or a response via [facility]? callback       Best Call Back Number:Telephone Information:  [employer]          [phone]

## 2024-10-29 ENCOUNTER — TELEPHONE (OUTPATIENT)
Dept: FAMILY MEDICINE | Facility: CLINIC | Age: 44
End: 2024-10-29
Payer: COMMERCIAL

## 2024-10-29 ENCOUNTER — HOSPITAL ENCOUNTER (EMERGENCY)
Facility: HOSPITAL | Age: 44
Discharge: HOME | End: 2024-10-29
Attending: EMERGENCY MEDICINE
Payer: COMMERCIAL

## 2024-10-29 VITALS
WEIGHT: 196.63 LBS | HEIGHT: 68 IN | DIASTOLIC BLOOD PRESSURE: 76 MMHG | TEMPERATURE: 98 F | SYSTOLIC BLOOD PRESSURE: 132 MMHG | RESPIRATION RATE: 18 BRPM | OXYGEN SATURATION: 98 % | HEART RATE: 72 BPM | BODY MASS INDEX: 29.8 KG/M2

## 2024-10-29 DIAGNOSIS — R10.9 LEFT FLANK PAIN: Primary | ICD-10-CM

## 2024-10-29 LAB
ALBUMIN SERPL BCP-MCNC: 3.8 G/DL (ref 3.5–5.2)
ALP SERPL-CCNC: 54 U/L (ref 40–150)
ALT SERPL W/O P-5'-P-CCNC: 20 U/L (ref 10–44)
ANION GAP SERPL CALC-SCNC: 8 MMOL/L (ref 8–16)
AST SERPL-CCNC: 21 U/L (ref 10–40)
BASOPHILS # BLD AUTO: 0.03 K/UL (ref 0–0.2)
BASOPHILS NFR BLD: 0.6 % (ref 0–1.9)
BILIRUB SERPL-MCNC: 1 MG/DL (ref 0.1–1)
BILIRUB UR QL STRIP: NEGATIVE
BUN SERPL-MCNC: 15 MG/DL (ref 6–20)
CALCIUM SERPL-MCNC: 8.8 MG/DL (ref 8.7–10.5)
CHLORIDE SERPL-SCNC: 105 MMOL/L (ref 95–110)
CLARITY UR: CLEAR
CO2 SERPL-SCNC: 26 MMOL/L (ref 23–29)
COLOR UR: YELLOW
CREAT SERPL-MCNC: 1.4 MG/DL (ref 0.5–1.4)
DIFFERENTIAL METHOD BLD: ABNORMAL
EOSINOPHIL # BLD AUTO: 0.1 K/UL (ref 0–0.5)
EOSINOPHIL NFR BLD: 1.5 % (ref 0–8)
ERYTHROCYTE [DISTWIDTH] IN BLOOD BY AUTOMATED COUNT: 12.6 % (ref 11.5–14.5)
EST. GFR  (NO RACE VARIABLE): >60 ML/MIN/1.73 M^2
GLUCOSE SERPL-MCNC: 116 MG/DL (ref 70–110)
GLUCOSE UR QL STRIP: NEGATIVE
HCT VFR BLD AUTO: 38.8 % (ref 40–54)
HCV AB SERPL QL IA: NEGATIVE
HEP C VIRUS HOLD SPECIMEN: NORMAL
HGB BLD-MCNC: 13.4 G/DL (ref 14–18)
HGB UR QL STRIP: NEGATIVE
HIV 1+2 AB+HIV1 P24 AG SERPL QL IA: NEGATIVE
IMM GRANULOCYTES # BLD AUTO: 0.01 K/UL (ref 0–0.04)
IMM GRANULOCYTES NFR BLD AUTO: 0.2 % (ref 0–0.5)
KETONES UR QL STRIP: NEGATIVE
LEUKOCYTE ESTERASE UR QL STRIP: NEGATIVE
LYMPHOCYTES # BLD AUTO: 2.6 K/UL (ref 1–4.8)
LYMPHOCYTES NFR BLD: 49.2 % (ref 18–48)
MCH RBC QN AUTO: 31.2 PG (ref 27–31)
MCHC RBC AUTO-ENTMCNC: 34.5 G/DL (ref 32–36)
MCV RBC AUTO: 90 FL (ref 82–98)
MONOCYTES # BLD AUTO: 0.6 K/UL (ref 0.3–1)
MONOCYTES NFR BLD: 12 % (ref 4–15)
NEUTROPHILS # BLD AUTO: 2 K/UL (ref 1.8–7.7)
NEUTROPHILS NFR BLD: 36.5 % (ref 38–73)
NITRITE UR QL STRIP: NEGATIVE
NRBC BLD-RTO: 0 /100 WBC
PH UR STRIP: 8 [PH] (ref 5–8)
PLATELET # BLD AUTO: 198 K/UL (ref 150–450)
PMV BLD AUTO: 10.9 FL (ref 9.2–12.9)
POTASSIUM SERPL-SCNC: 3.9 MMOL/L (ref 3.5–5.1)
PROT SERPL-MCNC: 6.9 G/DL (ref 6–8.4)
PROT UR QL STRIP: NEGATIVE
RBC # BLD AUTO: 4.29 M/UL (ref 4.6–6.2)
SODIUM SERPL-SCNC: 139 MMOL/L (ref 136–145)
SP GR UR STRIP: 1.02 (ref 1–1.03)
URN SPEC COLLECT METH UR: NORMAL
UROBILINOGEN UR STRIP-ACNC: NEGATIVE EU/DL
WBC # BLD AUTO: 5.33 K/UL (ref 3.9–12.7)

## 2024-10-29 PROCEDURE — 96375 TX/PRO/DX INJ NEW DRUG ADDON: CPT

## 2024-10-29 PROCEDURE — 87389 HIV-1 AG W/HIV-1&-2 AB AG IA: CPT | Performed by: EMERGENCY MEDICINE

## 2024-10-29 PROCEDURE — 81003 URINALYSIS AUTO W/O SCOPE: CPT | Performed by: EMERGENCY MEDICINE

## 2024-10-29 PROCEDURE — 86803 HEPATITIS C AB TEST: CPT | Performed by: EMERGENCY MEDICINE

## 2024-10-29 PROCEDURE — 85025 COMPLETE CBC W/AUTO DIFF WBC: CPT | Performed by: EMERGENCY MEDICINE

## 2024-10-29 PROCEDURE — 80053 COMPREHEN METABOLIC PANEL: CPT | Performed by: EMERGENCY MEDICINE

## 2024-10-29 PROCEDURE — 25000003 PHARM REV CODE 250: Performed by: EMERGENCY MEDICINE

## 2024-10-29 PROCEDURE — 99285 EMERGENCY DEPT VISIT HI MDM: CPT | Mod: 25

## 2024-10-29 PROCEDURE — 63600175 PHARM REV CODE 636 W HCPCS: Performed by: EMERGENCY MEDICINE

## 2024-10-29 PROCEDURE — 96374 THER/PROPH/DIAG INJ IV PUSH: CPT

## 2024-10-29 RX ORDER — ONDANSETRON HYDROCHLORIDE 2 MG/ML
4 INJECTION, SOLUTION INTRAVENOUS
Status: COMPLETED | OUTPATIENT
Start: 2024-10-29 | End: 2024-10-29

## 2024-10-29 RX ORDER — KETOROLAC TROMETHAMINE 30 MG/ML
15 INJECTION, SOLUTION INTRAMUSCULAR; INTRAVENOUS
Status: COMPLETED | OUTPATIENT
Start: 2024-10-29 | End: 2024-10-29

## 2024-10-29 RX ORDER — HYDROCODONE BITARTRATE AND ACETAMINOPHEN 10; 325 MG/1; MG/1
1 TABLET ORAL
Status: COMPLETED | OUTPATIENT
Start: 2024-10-29 | End: 2024-10-29

## 2024-10-29 RX ORDER — NAPROXEN 375 MG/1
375 TABLET ORAL 2 TIMES DAILY WITH MEALS
Qty: 30 TABLET | Refills: 0 | Status: SHIPPED | OUTPATIENT
Start: 2024-10-29

## 2024-10-29 RX ORDER — CYCLOBENZAPRINE HCL 10 MG
10 TABLET ORAL 3 TIMES DAILY PRN
Qty: 15 TABLET | Refills: 0 | Status: SHIPPED | OUTPATIENT
Start: 2024-10-29 | End: 2024-11-03

## 2024-10-29 RX ORDER — TAMSULOSIN HYDROCHLORIDE 0.4 MG/1
0.4 CAPSULE ORAL DAILY
Qty: 30 CAPSULE | Refills: 0 | Status: SHIPPED | OUTPATIENT
Start: 2024-10-29 | End: 2025-10-29

## 2024-10-29 RX ADMIN — ONDANSETRON 4 MG: 2 INJECTION INTRAMUSCULAR; INTRAVENOUS at 08:10

## 2024-10-29 RX ADMIN — KETOROLAC TROMETHAMINE 15 MG: 30 INJECTION, SOLUTION INTRAMUSCULAR at 08:10

## 2024-10-29 RX ADMIN — HYDROCODONE BITARTRATE AND ACETAMINOPHEN 1 TABLET: 10; 325 TABLET ORAL at 09:10

## 2024-10-30 ENCOUNTER — OFFICE VISIT (OUTPATIENT)
Dept: FAMILY MEDICINE | Facility: CLINIC | Age: 44
End: 2024-10-30
Payer: COMMERCIAL

## 2024-10-30 ENCOUNTER — LAB VISIT (OUTPATIENT)
Dept: LAB | Facility: HOSPITAL | Age: 44
End: 2024-10-30
Attending: INTERNAL MEDICINE
Payer: COMMERCIAL

## 2024-10-30 VITALS
SYSTOLIC BLOOD PRESSURE: 122 MMHG | HEART RATE: 70 BPM | WEIGHT: 198 LBS | TEMPERATURE: 98 F | HEIGHT: 68 IN | BODY MASS INDEX: 30.01 KG/M2 | RESPIRATION RATE: 18 BRPM | DIASTOLIC BLOOD PRESSURE: 70 MMHG

## 2024-10-30 DIAGNOSIS — R10.9 LEFT FLANK PAIN: ICD-10-CM

## 2024-10-30 DIAGNOSIS — K59.00 CONSTIPATION, UNSPECIFIED CONSTIPATION TYPE: ICD-10-CM

## 2024-10-30 DIAGNOSIS — R39.198 DIFFICULTY URINATING: ICD-10-CM

## 2024-10-30 DIAGNOSIS — E86.0 DEHYDRATION: ICD-10-CM

## 2024-10-30 DIAGNOSIS — E86.0 DEHYDRATION: Primary | ICD-10-CM

## 2024-10-30 LAB
COMPLEXED PSA SERPL-MCNC: 0.57 NG/ML (ref 0–4)
ESTIMATED AVG GLUCOSE: 111 MG/DL (ref 68–131)
HBA1C MFR BLD: 5.5 % (ref 4–5.6)
TSH SERPL DL<=0.005 MIU/L-ACNC: 1.14 UIU/ML (ref 0.4–4)

## 2024-10-30 PROCEDURE — 84443 ASSAY THYROID STIM HORMONE: CPT | Performed by: INTERNAL MEDICINE

## 2024-10-30 PROCEDURE — 83036 HEMOGLOBIN GLYCOSYLATED A1C: CPT | Performed by: INTERNAL MEDICINE

## 2024-10-30 PROCEDURE — 84153 ASSAY OF PSA TOTAL: CPT | Performed by: INTERNAL MEDICINE

## 2024-10-30 PROCEDURE — 99214 OFFICE O/P EST MOD 30 MIN: CPT | Mod: S$GLB,,, | Performed by: INTERNAL MEDICINE

## 2024-10-30 PROCEDURE — 36415 COLL VENOUS BLD VENIPUNCTURE: CPT | Mod: PO | Performed by: INTERNAL MEDICINE

## 2024-10-30 PROCEDURE — 99999 PR PBB SHADOW E&M-EST. PATIENT-LVL III: CPT | Mod: PBBFAC,,, | Performed by: INTERNAL MEDICINE

## 2024-10-31 ENCOUNTER — TELEPHONE (OUTPATIENT)
Dept: FAMILY MEDICINE | Facility: CLINIC | Age: 44
End: 2024-10-31
Payer: COMMERCIAL

## 2024-12-04 ENCOUNTER — TELEPHONE (OUTPATIENT)
Dept: FAMILY MEDICINE | Facility: CLINIC | Age: 44
End: 2024-12-04
Payer: COMMERCIAL

## 2024-12-04 NOTE — TELEPHONE ENCOUNTER
"----- Message from John sent at 12/4/2024 11:03 AM CST -----  Regarding: Patient Callback Request  Contact: Pt [phone]  .[email or web address]     Patient is calling for Medical Advice regarding: Patient wants more information about "Doxydycliane". He said he wanted to try it and wanted to know what Dr. Jefferson thought about it.  He wanted to know the steps needed to take this medication. Please call patient back to discuss options.     How long has patient had these symptoms:    Pharmacy name and phone#:    [id number] - [place] - [address]  [address] [address] [address]  Phone: [phone] Fax: [phone]          Patient wants a call back or thru [facility]: Call    Comments:    Please advise patient replies from provider may take up to 48 hours.  "

## 2024-12-04 NOTE — TELEPHONE ENCOUNTER
Spoke with patient informed Dr. Jefferson advise he go to urgent care clinic for evaluation to rule out pneumonia, voiced understanding.

## 2024-12-04 NOTE — TELEPHONE ENCOUNTER
Spoke with patient states he has been coughing with green sputum x 5 days with sore throat, symptoms are worse at night. Requesting antibiotic.

## 2024-12-31 NOTE — PROGRESS NOTES
Patient ID: Sukumar Bob is a 44 y.o. male.    Chief Complaint: Annual Exam      HPI  History of Present Illness    - Mr. Bob presents for routine labs and to discuss a recent flare-up of a skin condition.    Mr. Bob reports a recent flare-up of a skin condition with bumps, with some residual bumps still present. He plans to resume Valtrex treatment and asks about the appropriate duration. Mr. Bob also mentions elevated testosterone levels from a test last year. He did not follow up with the urologist as recommended for a three-month check after the initial elevated result and is unsure about the implications of the elevated testosterone levels.      ROS:  General: denies fever, denies chills, denies fatigue, denies weight gain, denies weight loss  Eyes: denies vision changes, denies redness, denies discharge  ENT: denies ear pain, denies nasal congestion, denies sore throat  Cardiovascular: denies chest pain, denies palpitations, denies lower extremity edema  Respiratory: denies cough, denies shortness of breath  Gastrointestinal: denies abdominal pain, denies nausea, denies vomiting, denies diarrhea, denies constipation, denies blood in stool  Genitourinary: denies dysuria, denies hematuria, denies frequency  Musculoskeletal: denies joint pain, denies muscle pain  Skin: complains of rash, denies lesion  Neurological: denies headache, denies dizziness, denies numbness, denies tingling  Psychiatric: denies anxiety, denies depression, denies sleep difficulty                   Objective       Current Outpatient Medications:     celecoxib (CELEBREX) 200 MG capsule, Take 200 mg by mouth., Disp: , Rfl:     oxyCODONE-acetaminophen (PERCOCET)  mg per tablet, Take 1 tablet by mouth every 8 (eight) hours as needed., Disp: , Rfl:     ibuprofen (ADVIL,MOTRIN) 800 MG tablet, Take 1 tablet (800 mg total) by mouth 3 (three) times daily., Disp: 90 tablet, Rfl: 3    naproxen (NAPROSYN) 375 MG  "tablet, Take 1 tablet (375 mg total) by mouth 2 (two) times daily with meals., Disp: 30 tablet, Rfl: 0    tamsulosin (FLOMAX) 0.4 mg Cap, Take 1 capsule (0.4 mg total) by mouth once daily., Disp: 30 capsule, Rfl: 0    traMADoL (ULTRAM) 50 mg tablet, Take 1 tablet (50 mg total) by mouth every 6 (six) hours., Disp: 10 each, Rfl: 0    valACYclovir (VALTREX) 1000 MG tablet, TAKE 1 TABLET BY MOUTH TWICE A DAY, Disp: 180 tablet, Rfl: 1     Physical Exam    General: In no acute distress.  Head: Normocephalic. Non traumatic.  Eyes: PERRLA. EOMs full. Conjunctivae clear. Fundi grossly normal.  Ears: EACs clear. TMs normal.  Nose: Mucosa pink. Mucosa moist. No obstruction.  Throat: Clear. No exudates. No lesions.  Neck: Supple. No masses. No thyromegaly. No bruits.  Chest: Lungs clear. No rales. No rhonchi. No wheezes.  Heart: RRR. No murmurs. No rubs. No gallops.  Abdomen: Soft. No tenderness. No masses. BS normal.  : Normal external genitalia. No lesions. No discharge. No hernias  noted.  Back: Normal curvature. No scoliosis. No tenderness.  Extremities: Warm. Well perfused. No upper extremity edema. No lower extremity edema. FROM. No deformities. No joint erythema.  Neuro: No focal deficits appreciated. Good muscle tone. Normal response to visual stimuli. Normal response to auditory stimuli.  Skin: Normal. No rashes. No lesions noted.               VITAL SIGNS:  Vitals:    01/02/25 1643   BP: 122/80   BP Location: Left arm   Patient Position: Sitting   Pulse: 109   Resp: 17   Temp: 97.7 °F (36.5 °C)   TempSrc: Temporal   SpO2: 97%   Weight: 90.8 kg (200 lb 2.8 oz)   Height: 5' 8" (1.727 m)       CURRENT BMI:   Body mass index is 30.44 kg/m².    LABS REVIEWED:    CBC:  Lab Results   Component Value Date    WBC 5.33 10/29/2024    RBC 4.29 (L) 10/29/2024    HGB 13.4 (L) 10/29/2024    HCT 38.8 (L) 10/29/2024    MCV 90 10/29/2024    MCH 31.2 (H) 10/29/2024    MCHC 34.5 10/29/2024    RDW 12.6 10/29/2024     10/29/2024 "    MPV 10.9 10/29/2024    GRAN 2.0 10/29/2024    GRAN 36.5 (L) 10/29/2024    LYMPH 2.6 10/29/2024    LYMPH 49.2 (H) 10/29/2024    MONO 0.6 10/29/2024    MONO 12.0 10/29/2024    EOS 0.1 10/29/2024    BASO 0.03 10/29/2024    EOSINOPHIL 1.5 10/29/2024    BASOPHIL 0.6 10/29/2024       CHEMISTRY:  Sodium   Date Value Ref Range Status   10/29/2024 139 136 - 145 mmol/L Final     Potassium   Date Value Ref Range Status   10/29/2024 3.9 3.5 - 5.1 mmol/L Final     Chloride   Date Value Ref Range Status   10/29/2024 105 95 - 110 mmol/L Final     CO2   Date Value Ref Range Status   10/29/2024 26 23 - 29 mmol/L Final     Glucose   Date Value Ref Range Status   10/29/2024 116 (H) 70 - 110 mg/dL Final     BUN   Date Value Ref Range Status   10/29/2024 15 6 - 20 mg/dL Final     Creatinine   Date Value Ref Range Status   10/29/2024 1.4 0.5 - 1.4 mg/dL Final     Calcium   Date Value Ref Range Status   10/29/2024 8.8 8.7 - 10.5 mg/dL Final     Total Protein   Date Value Ref Range Status   10/29/2024 6.9 6.0 - 8.4 g/dL Final     Albumin   Date Value Ref Range Status   10/29/2024 3.8 3.5 - 5.2 g/dL Final   03/17/2023 4.2 3.6 - 5.1 g/dL Final     Total Bilirubin   Date Value Ref Range Status   10/29/2024 1.0 0.1 - 1.0 mg/dL Final     Comment:     For infants and newborns, interpretation of results should be based  on gestational age, weight and in agreement with clinical  observations.    Premature Infant recommended reference ranges:  Up to 24 hours.............<8.0 mg/dL  Up to 48 hours............<12.0 mg/dL  3-5 days..................<15.0 mg/dL  6-29 days.................<15.0 mg/dL       Alkaline Phosphatase   Date Value Ref Range Status   10/29/2024 54 40 - 150 U/L Final     AST   Date Value Ref Range Status   10/29/2024 21 10 - 40 U/L Final     ALT   Date Value Ref Range Status   10/29/2024 20 10 - 44 U/L Final     Anion Gap   Date Value Ref Range Status   10/29/2024 8 8 - 16 mmol/L Final     eGFR   Date Value Ref Range Status    10/29/2024 >60 >60 mL/min/1.73 m^2 Final       LIPID PANEL:  Lab Results   Component Value Date    CHOL 187 01/19/2024    CHOL 178 01/12/2023     Lab Results   Component Value Date    TRIG 86 01/19/2024    TRIG 76 01/12/2023     Lab Results   Component Value Date    HDL 51 01/19/2024    HDL 53 01/12/2023     Lab Results   Component Value Date    LDLCALC 118.8 01/19/2024    LDLCALC 109.8 01/12/2023       THYROID:  Lab Results   Component Value Date    TSH 1.136 10/30/2024    FREET4 1.00 01/06/2016       DIABETES:  Lab Results   Component Value Date    HGBA1C 5.5 10/30/2024     Microalb/Creat Ratio   Date Value Ref Range Status   02/20/2015 1.9 0.0 - 30.0 ug/mg Final       Assessment and Plan     1. Dyslipidemia  Comments:  Will obtain lipid panel  Orders:  -     Lipid Panel; Future; Expected date: 01/02/2025  -     TSH; Future; Expected date: 01/02/2025    2. Annual physical exam  Comments:  Will obtain CBC, CMP, TSH, A1c and lipid panel for regular health monitoring  Orders:  -     CBC Auto Differential; Future; Expected date: 01/02/2025  -     Comprehensive Metabolic Panel; Future; Expected date: 01/02/2025  -     Lipid Panel; Future; Expected date: 01/02/2025  -     Hemoglobin A1C; Future; Expected date: 01/02/2025  -     TSH; Future; Expected date: 01/02/2025    3. HSV-2 infection  Comments:  Recommend taking Valtrex daily until flare resolved        Assessment & Plan    SKIN CONDITION (POSSIBLY HIDRADENITIS SUPPURATIVA):  - Assessed the patient's recent flare-up and current symptoms, noting persistent bumps.  - Performed a physical exam to evaluate the condition.  - Advised the patient to restart and continue Botox injections for lesions until they heal.  - Instructed the patient to monitor the progress of lesion healing and report any changes.    ELEVATED TESTOSTERONE:  - Reviewed the patient's previous year's lab results, noting elevated testosterone levels.  - Explained to the patient that elevated  testosterone levels could indicate excessive supplementation.  - Referred the patient to a urologist for further evaluation of the elevated testosterone levels.  - Advised the patient to bring all current medications and supplements to the urologist appointment for review.  - Instructed the patient to report any new symptoms or concerns related to testosterone levels before the urologist appointment.    LABS:  - Ordered CBC, CMP, and testosterone level tests for the patient to complete the following morning at 8 AM.  - Instructed the patient to fast for 8-12 hours before the labs, if required.                  No follow-ups on file.  This note was generated with the assistance of ambient listening technology. Verbal consent was obtained by the patient and accompanying visitor(s) for the recording of patient appointment to facilitate this note. I attest to having reviewed and edited the generated note for accuracy, though some syntax or spelling errors may persist. Please contact the author of this note for any clarification.

## 2025-01-02 ENCOUNTER — OFFICE VISIT (OUTPATIENT)
Dept: FAMILY MEDICINE | Facility: CLINIC | Age: 45
End: 2025-01-02
Payer: COMMERCIAL

## 2025-01-02 VITALS
TEMPERATURE: 98 F | WEIGHT: 200.19 LBS | BODY MASS INDEX: 30.34 KG/M2 | DIASTOLIC BLOOD PRESSURE: 80 MMHG | OXYGEN SATURATION: 97 % | HEART RATE: 109 BPM | RESPIRATION RATE: 17 BRPM | SYSTOLIC BLOOD PRESSURE: 122 MMHG | HEIGHT: 68 IN

## 2025-01-02 DIAGNOSIS — B00.9 HSV-2 INFECTION: ICD-10-CM

## 2025-01-02 DIAGNOSIS — E78.5 DYSLIPIDEMIA: Primary | ICD-10-CM

## 2025-01-02 DIAGNOSIS — Z00.00 ANNUAL PHYSICAL EXAM: ICD-10-CM

## 2025-01-02 PROCEDURE — 99999 PR PBB SHADOW E&M-EST. PATIENT-LVL III: CPT | Mod: PBBFAC,,, | Performed by: INTERNAL MEDICINE

## 2025-01-02 RX ORDER — OXYCODONE AND ACETAMINOPHEN 10; 325 MG/1; MG/1
1 TABLET ORAL EVERY 8 HOURS PRN
COMMUNITY
Start: 2024-12-05

## 2025-01-02 RX ORDER — CELECOXIB 200 MG/1
200 CAPSULE ORAL
COMMUNITY
Start: 2024-11-06

## 2025-01-03 ENCOUNTER — LAB VISIT (OUTPATIENT)
Dept: LAB | Facility: HOSPITAL | Age: 45
End: 2025-01-03
Attending: INTERNAL MEDICINE
Payer: COMMERCIAL

## 2025-01-03 DIAGNOSIS — E78.5 DYSLIPIDEMIA: ICD-10-CM

## 2025-01-03 DIAGNOSIS — Z00.00 ANNUAL PHYSICAL EXAM: ICD-10-CM

## 2025-01-03 LAB
ALBUMIN SERPL BCP-MCNC: 4 G/DL (ref 3.5–5.2)
ALP SERPL-CCNC: 60 U/L (ref 40–150)
ALT SERPL W/O P-5'-P-CCNC: 14 U/L (ref 10–44)
ANION GAP SERPL CALC-SCNC: 8 MMOL/L (ref 8–16)
AST SERPL-CCNC: 26 U/L (ref 10–40)
BASOPHILS # BLD AUTO: 0.01 K/UL (ref 0–0.2)
BASOPHILS NFR BLD: 0.2 % (ref 0–1.9)
BILIRUB SERPL-MCNC: 1.3 MG/DL (ref 0.1–1)
BUN SERPL-MCNC: 21 MG/DL (ref 6–20)
CALCIUM SERPL-MCNC: 8.8 MG/DL (ref 8.7–10.5)
CHLORIDE SERPL-SCNC: 107 MMOL/L (ref 95–110)
CHOLEST SERPL-MCNC: 181 MG/DL (ref 120–199)
CHOLEST/HDLC SERPL: 2.6 {RATIO} (ref 2–5)
CO2 SERPL-SCNC: 27 MMOL/L (ref 23–29)
CREAT SERPL-MCNC: 1.5 MG/DL (ref 0.5–1.4)
DIFFERENTIAL METHOD BLD: ABNORMAL
EOSINOPHIL # BLD AUTO: 0.1 K/UL (ref 0–0.5)
EOSINOPHIL NFR BLD: 1.9 % (ref 0–8)
ERYTHROCYTE [DISTWIDTH] IN BLOOD BY AUTOMATED COUNT: 13.5 % (ref 11.5–14.5)
EST. GFR  (NO RACE VARIABLE): 59.2 ML/MIN/1.73 M^2
ESTIMATED AVG GLUCOSE: 103 MG/DL (ref 68–131)
GLUCOSE SERPL-MCNC: 85 MG/DL (ref 70–110)
HBA1C MFR BLD: 5.2 % (ref 4–5.6)
HCT VFR BLD AUTO: 41.1 % (ref 40–54)
HDLC SERPL-MCNC: 69 MG/DL (ref 40–75)
HDLC SERPL: 38.1 % (ref 20–50)
HGB BLD-MCNC: 13.9 G/DL (ref 14–18)
IMM GRANULOCYTES # BLD AUTO: 0.01 K/UL (ref 0–0.04)
IMM GRANULOCYTES NFR BLD AUTO: 0.2 % (ref 0–0.5)
LDLC SERPL CALC-MCNC: 95.6 MG/DL (ref 63–159)
LYMPHOCYTES # BLD AUTO: 2.1 K/UL (ref 1–4.8)
LYMPHOCYTES NFR BLD: 44.3 % (ref 18–48)
MCH RBC QN AUTO: 31.4 PG (ref 27–31)
MCHC RBC AUTO-ENTMCNC: 33.8 G/DL (ref 32–36)
MCV RBC AUTO: 93 FL (ref 82–98)
MONOCYTES # BLD AUTO: 0.7 K/UL (ref 0.3–1)
MONOCYTES NFR BLD: 14.5 % (ref 4–15)
NEUTROPHILS # BLD AUTO: 1.8 K/UL (ref 1.8–7.7)
NEUTROPHILS NFR BLD: 38.9 % (ref 38–73)
NONHDLC SERPL-MCNC: 112 MG/DL
NRBC BLD-RTO: 0 /100 WBC
PLATELET # BLD AUTO: 207 K/UL (ref 150–450)
PMV BLD AUTO: 11.8 FL (ref 9.2–12.9)
POTASSIUM SERPL-SCNC: 4.2 MMOL/L (ref 3.5–5.1)
PROT SERPL-MCNC: 7.4 G/DL (ref 6–8.4)
RBC # BLD AUTO: 4.42 M/UL (ref 4.6–6.2)
SODIUM SERPL-SCNC: 142 MMOL/L (ref 136–145)
TRIGL SERPL-MCNC: 82 MG/DL (ref 30–150)
TSH SERPL DL<=0.005 MIU/L-ACNC: 1.27 UIU/ML (ref 0.4–4)
WBC # BLD AUTO: 4.69 K/UL (ref 3.9–12.7)

## 2025-01-03 PROCEDURE — 80053 COMPREHEN METABOLIC PANEL: CPT | Performed by: INTERNAL MEDICINE

## 2025-01-03 PROCEDURE — 80061 LIPID PANEL: CPT | Performed by: INTERNAL MEDICINE

## 2025-01-03 PROCEDURE — 83036 HEMOGLOBIN GLYCOSYLATED A1C: CPT | Performed by: INTERNAL MEDICINE

## 2025-01-03 PROCEDURE — 84443 ASSAY THYROID STIM HORMONE: CPT | Performed by: INTERNAL MEDICINE

## 2025-01-03 PROCEDURE — 36415 COLL VENOUS BLD VENIPUNCTURE: CPT | Mod: PO | Performed by: INTERNAL MEDICINE

## 2025-01-03 PROCEDURE — 85025 COMPLETE CBC W/AUTO DIFF WBC: CPT | Performed by: INTERNAL MEDICINE

## 2025-01-28 RX ORDER — VALACYCLOVIR HYDROCHLORIDE 1 G/1
1000 TABLET, FILM COATED ORAL 2 TIMES DAILY
Qty: 60 TABLET | Refills: 0 | Status: SHIPPED | OUTPATIENT
Start: 2025-01-28 | End: 2025-02-03

## 2025-01-28 NOTE — TELEPHONE ENCOUNTER
No care due was identified.  Health [facility] Embedded Care Due Messages. Reference number: [id number].   1/28/2025 11:44:41 AM CST

## 2025-02-01 NOTE — TELEPHONE ENCOUNTER
No care due was identified.  Health [facility] Embedded Care Due Messages. Reference number: [id number].   2/01/2025 5:20:37 PM CST

## 2025-02-03 RX ORDER — VALACYCLOVIR HYDROCHLORIDE 1 G/1
1000 TABLET, FILM COATED ORAL 2 TIMES DAILY
Qty: 180 TABLET | Refills: 0 | Status: SHIPPED | OUTPATIENT
Start: 2025-02-03

## 2025-03-25 ENCOUNTER — OFFICE VISIT (OUTPATIENT)
Dept: FAMILY MEDICINE | Facility: CLINIC | Age: 45
End: 2025-03-25
Payer: COMMERCIAL

## 2025-03-25 VITALS
OXYGEN SATURATION: 98 % | HEART RATE: 73 BPM | BODY MASS INDEX: 30.79 KG/M2 | DIASTOLIC BLOOD PRESSURE: 62 MMHG | TEMPERATURE: 97 F | SYSTOLIC BLOOD PRESSURE: 106 MMHG | RESPIRATION RATE: 18 BRPM | WEIGHT: 203.13 LBS | HEIGHT: 68 IN

## 2025-03-25 DIAGNOSIS — G47.00 INSOMNIA, UNSPECIFIED TYPE: ICD-10-CM

## 2025-03-25 DIAGNOSIS — R79.89 LOW TESTOSTERONE: ICD-10-CM

## 2025-03-25 DIAGNOSIS — M77.11 LATERAL EPICONDYLITIS OF RIGHT ELBOW: Primary | ICD-10-CM

## 2025-03-25 DIAGNOSIS — N48.9 PENILE LESION: ICD-10-CM

## 2025-03-25 PROBLEM — E66.811 CLASS 1 OBESITY WITH SERIOUS COMORBIDITY AND BODY MASS INDEX (BMI) OF 31.0 TO 31.9 IN ADULT: Status: RESOLVED | Noted: 2023-01-12 | Resolved: 2025-03-25

## 2025-03-25 PROBLEM — N45.3 EPIDIDYMOORCHITIS: Status: RESOLVED | Noted: 2020-09-25 | Resolved: 2025-03-25

## 2025-03-25 PROCEDURE — 99215 OFFICE O/P EST HI 40 MIN: CPT | Mod: S$GLB,,, | Performed by: REGISTERED NURSE

## 2025-03-25 PROCEDURE — 99999 PR PBB SHADOW E&M-EST. PATIENT-LVL IV: CPT | Mod: PBBFAC,,, | Performed by: REGISTERED NURSE

## 2025-03-25 PROCEDURE — G2211 COMPLEX E/M VISIT ADD ON: HCPCS | Mod: S$GLB,,, | Performed by: REGISTERED NURSE

## 2025-03-25 RX ORDER — NABUMETONE 750 MG/1
750 TABLET, FILM COATED ORAL 2 TIMES DAILY PRN
Qty: 60 TABLET | Refills: 0 | Status: SHIPPED | OUTPATIENT
Start: 2025-03-25 | End: 2025-03-25 | Stop reason: SDUPTHER

## 2025-03-25 RX ORDER — METHYLPREDNISOLONE 4 MG/1
TABLET ORAL
Qty: 1 EACH | Refills: 0 | Status: SHIPPED | OUTPATIENT
Start: 2025-03-25 | End: 2025-03-25 | Stop reason: SDUPTHER

## 2025-03-25 RX ORDER — NABUMETONE 750 MG/1
750 TABLET, FILM COATED ORAL 2 TIMES DAILY PRN
Qty: 60 TABLET | Refills: 0 | Status: SHIPPED | OUTPATIENT
Start: 2025-03-25

## 2025-03-25 RX ORDER — METHYLPREDNISOLONE 4 MG/1
TABLET ORAL
Qty: 1 EACH | Refills: 0 | Status: SHIPPED | OUTPATIENT
Start: 2025-03-25

## 2025-03-25 RX ORDER — TRAZODONE HYDROCHLORIDE 50 MG/1
50 TABLET ORAL NIGHTLY PRN
Qty: 30 TABLET | Refills: 3 | Status: SHIPPED | OUTPATIENT
Start: 2025-03-25 | End: 2026-03-25

## 2025-03-25 NOTE — PROGRESS NOTES
Sukumar Bob  MRN:  [id number]  44 y.o. male      Patient Care Team:  Jet Jefferson MD as PCP - General (Internal Medicine)  Abraham Reaves MD as Consulting Physician (Urology)  Augustine Hopkins MD as Consulting Physician (Pain Medicine)  Michael Rocha NP as Nurse Practitioner (Family Medicine)      SUBJECTIVE:     CHIEF COMPLAINT:  - Multiple concerns    HPI:  Mr. Bob reports right elbow pain that began on January 13th after bowling. The pain is localized to the lateral aspect of the elbow and is exacerbated by turning the wrist and lifting dumbbells during workouts. Mr. Bob has applied a topical pain relief patch once, which provided minimal relief.    Mr. Bob complains of sleep disturbances due to recent life changes, though he maintains a regular daytime work schedule. He previously took trazodone for sleep issues in [id number], which he found beneficial. Mr. Bob reports difficulty with OTC sleep aids, noting they cause morning grogginess.    Mr. Bob has genital herpes with rare outbreaks and typically takes Valtrex as needed when he anticipates an outbreak. He reports persistent lesions in his genital area since returning from a trip to [place] around spring break last year. He took medication for about a week at that time, but the lesions have not resolved. Mr. oBb describes the lesions as painful and located on his penis, distinct from his typical herpes outbreaks which usually affect his glans or buttocks area.    He is concerned with low-T, previously evaluated by Urology.  He has not seen Dr. Reaves in some time now. Wishes to consider T-replacement if needed. Denies decreased libido or muscle atrophy. Reports mild intermittent fatigue and difficulty maintaining an erection.      Review of Systems   Constitutional:  Positive for malaise/fatigue (mild/intermittent). Negative for chills, diaphoresis and fever.   Respiratory: Negative.    "  Cardiovascular: Negative.    Gastrointestinal: Negative.    Genitourinary:  Negative for dysuria, flank pain, frequency, hematuria and urgency.        + ED issues reported, h/o low-T   Musculoskeletal:  Positive for joint pain. Negative for back pain, falls and neck pain.   Skin:  Positive for rash (penile shaft). Negative for itching.        Has h/o HSV-2, on Valtrex prn   Neurological: Negative.    Psychiatric/Behavioral:  Negative for hallucinations and memory loss. The patient has insomnia. The patient is not nervous/anxious.        Review of patient's allergies indicates:  No Known Allergies      Problem List[1]    Current Outpatient Medications   Medication Instructions    tamsulosin (FLOMAX) 0.4 mg, Oral, Daily    valACYclovir (VALTREX) 1,000 mg, Oral, 2 times daily         Past medical, surgical, family and social histories have been reviewed today.      OBJECTIVE:     Vitals:    03/25/25 1545   BP: 106/62   Pulse: 73   Resp: 18   Temp: 97.1 °F (36.2 °C)   TempSrc: Tympanic   SpO2: 98%   Weight: 92.1 kg (203 lb 2.5 oz)   Height: 5' 8" (1.727 m)     Vitals:    03/25/25 1545   PainSc:   4   PainLoc: Arm       Physical Exam  Vitals reviewed.   Constitutional:       General: He is not in acute distress.  HENT:      Head: Normocephalic and atraumatic.   Genitourinary:      Musculoskeletal:      Right elbow: No swelling, deformity or effusion. Normal range of motion. Tenderness present in lateral epicondyle. No radial head, medial epicondyle or olecranon process tenderness.   Neurological:      Mental Status: He is alert and oriented to person, place, and time.   Psychiatric:         Mood and Affect: Mood normal.         Behavior: Behavior normal.         Thought Content: Thought content normal.         Judgment: Judgment normal.         ASSESSMENT:     1. Lateral epicondylitis of right elbow ---- chronic pain ~ 3 months, he is right-handed dominant.  Treat with NSAID and steroid pack, avoid stress on right " elbow, allow time for rest.  Monitor.  -     methylPREDNISolone (MEDROL DOSEPACK) 4 mg tablet; use as directed  Dispense: 1 each; Refill: 0  -     nabumetone (RELAFEN) 750 MG tablet; Take 1 tablet (750 mg total) by mouth 2 (two) times daily as needed for Pain.  Dispense: 60 tablet; Refill: 0    2. Low testosterone ----- h/o low-T, wishes to consider replacement.  Need to update lab but may ultimately refer him back to Urology for further evaluation.  I have ordered his lab but advised to come early AM to have done.  -     Testosterone Panel; Future; Expected date: 03/25/2025    Component      Latest Ref Rng 1/12/2023 3/17/2023   Testosterone Total      250 - 1100 ng/dL  300    Testosterone, Free      46.0 - 224.0 pg/mL  66.2    Testosterone, Bioavailable      110.0 - 575.0 ng/dL  127.6    Sex Hormone Binding Globulin      10 - 50 nmol/L  16    Albumin      3.6 - 5.1 g/dL  4.2    Testosterone, Total      304 - 1227 ng/dL 355  296 (L)         3. Penile lesion ------ r/o molluscum, to Derm for further evaluation due to tenderness and length of time has been present ~ almost 1 year.  -     Ambulatory referral/consult to Dermatology; Future; Expected date: 04/01/2025    4. Insomnia, unspecified type ----- chronic intermittent issue, grad worse over time.  He has tried trazodone in the past and reports was effective.  Restart med at low dose, increase as needed.  -     traZODone (DESYREL) 50 MG tablet; Take 1 tablet (50 mg total) by mouth nightly as needed for Insomnia.  Dispense: 30 tablet; Refill: 3      PLAN:     Clinical reference material added to AVS regarding dx, txmt and care.  As above.  Annual wellness UTD, completed 1/2/25.  RTC as directed and/or prn.        VALDO Napier  [facility]       45 minutes of total time spent on the encounter, which includes face to face time and non-face to face time preparing to see the patient.  This includes obtaining and/or reviewing  separately obtained history, performing a medically appropriate examination and/or evaluation, and counseling and educating the patient/family/caregiver.  Includes documenting clinical information in the electronic or other health record, independently interpreting results (not separately reported) and communicating results to the patient/family/caregiver, with care coordination (not separately reported).  Medications, tests and/or procedures ordered as necessary along with referring and communicating with other health professionals (when not separately reported).         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

## 2025-03-27 ENCOUNTER — LAB VISIT (OUTPATIENT)
Dept: LAB | Facility: HOSPITAL | Age: 45
End: 2025-03-27
Attending: REGISTERED NURSE
Payer: COMMERCIAL

## 2025-03-27 DIAGNOSIS — R79.89 LOW TESTOSTERONE: ICD-10-CM

## 2025-03-27 PROCEDURE — 36415 COLL VENOUS BLD VENIPUNCTURE: CPT | Mod: PO

## 2025-03-27 PROCEDURE — 84403 ASSAY OF TOTAL TESTOSTERONE: CPT

## 2025-04-10 ENCOUNTER — RESULTS FOLLOW-UP (OUTPATIENT)
Dept: FAMILY MEDICINE | Facility: CLINIC | Age: 45
End: 2025-04-10

## 2025-04-10 DIAGNOSIS — G47.00 INSOMNIA, UNSPECIFIED TYPE: ICD-10-CM

## 2025-04-10 RX ORDER — TRAZODONE HYDROCHLORIDE 50 MG/1
50 TABLET ORAL NIGHTLY PRN
Qty: 30 TABLET | Refills: 0 | Status: SHIPPED | OUTPATIENT
Start: 2025-04-10 | End: 2026-04-10

## 2025-04-11 ENCOUNTER — TELEPHONE (OUTPATIENT)
Dept: FAMILY MEDICINE | Facility: CLINIC | Age: 45
End: 2025-04-11
Payer: COMMERCIAL

## 2025-04-11 ENCOUNTER — OFFICE VISIT (OUTPATIENT)
Dept: DERMATOLOGY | Facility: CLINIC | Age: 45
End: 2025-04-11
Payer: COMMERCIAL

## 2025-04-11 DIAGNOSIS — L81.1 MELASMA: Primary | ICD-10-CM

## 2025-04-11 DIAGNOSIS — A63.0 CONDYLOMA ACUMINATA: ICD-10-CM

## 2025-04-11 PROCEDURE — 99999 PR PBB SHADOW E&M-EST. PATIENT-LVL III: CPT | Mod: PBBFAC,,, | Performed by: STUDENT IN AN ORGANIZED HEALTH CARE EDUCATION/TRAINING PROGRAM

## 2025-04-11 RX ORDER — HYDROQUINONE 40 MG/G
CREAM TOPICAL 2 TIMES DAILY
Qty: 46 G | Refills: 1 | Status: SHIPPED | OUTPATIENT
Start: 2025-04-11 | End: 2025-05-11

## 2025-04-11 RX ORDER — IMIQUIMOD 12.5 MG/.25G
CREAM TOPICAL
Qty: 24 PACKET | Refills: 1 | Status: SHIPPED | OUTPATIENT
Start: 2025-04-11

## 2025-04-11 NOTE — PROGRESS NOTES
Patient Information  Name: Sukumar Bob  : 1980  MRN: [id number]     Referring Physician:  Dr. Rocha   Primary Care Physician:  Dr. Jefferson, Jet GREY MD   Date of Visit: 2025      Subjective:       Sukumar Bob is a 45 y.o. male who presents for   Chief Complaint   Patient presents with    Rash     Right thigh    Skin Discoloration     HPI  Patient is here with concern of: skin discoloration  Location: face  Duration: years  Symptoms: discoloration  Prior treatments: none    Patient is here with concern of: skin lesion  Location: penis  Duration: year  Symptoms: none  Prior treatments: none      Patient was last seen:Visit date not found     Prior notes by myself reviewed.   Clinical documentation obtained by nursing staff reviewed.    Review of Systems   Skin:  Negative for itching and rash.        Objective:    Physical Exam   Constitutional: He appears well-developed and well-nourished. No distress.   Genitourinary:         Neurological: He is alert and oriented to person, place, and time. He is not disoriented.   Psychiatric: He has a normal mood and affect.   Skin:   Areas Examined (abnormalities noted in diagram):   Head / Face Inspection Performed  Genitals / Buttocks / Groin Inspection Performed              Diagram Legend     Erythematous scaling macule/papule c/w actinic keratosis       Vascular papule c/w angioma      Pigmented verrucoid papule/plaque c/w seborrheic keratosis      Yellow umbilicated papule c/w sebaceous hyperplasia      Irregularly shaped tan macule c/w lentigo     1-2 mm smooth white papules consistent with Milia      Movable subcutaneous cyst with punctum c/w epidermal inclusion cyst      Subcutaneous movable cyst c/w pilar cyst      Firm pink to brown papule c/w dermatofibroma      Pedunculated fleshy papule(s) c/w skin tag(s)      Evenly pigmented macule c/w junctional nevus     Mildly variegated pigmented, slightly irregular-bordered  macule c/w mildly atypical nevus      Flesh colored to evenly pigmented papule c/w intradermal nevus       Pink pearly papule/plaque c/w basal cell carcinoma      Erythematous hyperkeratotic cursted plaque c/w SCC      Surgical scar with no sign of skin cancer recurrence      Open and closed comedones      Inflammatory papules and pustules      Verrucoid papule consistent consistent with wart     Erythematous eczematous patches and plaques     Dystrophic onycholytic nail with subungual debris c/w onychomycosis     Umbilicated papule    Erythematous-base heme-crusted tan verrucoid plaque consistent with inflamed seborrheic keratosis     Erythematous Silvery Scaling Plaque c/w Psoriasis     See annotation      No images are attached to the encounter or orders placed in the encounter.    [] Data reviewed  [] Independent review of test  [] Management discussed with another provider    Assessment / Plan:        Melasma  -     hydroquinone 4 % Crea; Apply topically 2 (two) times daily. Use for 8 weeks on, 4 weeks off, resume prn.  Dispense: 46 g; Refill: 1    Condyloma acuminata  -     imiquimod (ALDARA) 5 % cream; Apply topically 3 (three) times a week.  Dispense: 24 packet; Refill: 1  Cryosurgery procedure note:    Verbal consent from the patient is obtained including, but not limited to, risk of hypopigmentation/hyperpigmentation, scar, recurrence of lesion. Liquid nitrogen cryosurgery is applied to 5 lesions to produce a freeze injury. The patient is aware that blisters may form and is instructed on wound care with gentle cleansing and use of vaseline ointment to keep moist until healed. The patient is supplied a handout on cryosurgery and is instructed to call if lesions do not completely resolve.             LOS NUMBER AND COMPLEXITY OF PROBLEMS    COMPLEXITY OF DATA RISK TOTAL TIME (m)   03574  [id number] [] 1 self-limited or minor problem [x] Minimal to none [] No treatment recommended or patient to monitor  15-29  10-19   72653  59038 Low  [] 2 or > self limited or minor problems  [] 1 stable chronic illness  [] 1 acute, uncomplicated illness or injury Limited (2)  [] Prior external notes from each unique source  [] Review result of each unique test  [] Order each unique test []  Low  OTC medications, minor skin biopsy 30-44  20-29   86535  99277 Moderate  [x]  1 or > chronic illness with progression, exacerbation or SE of treatment  []  2 or more stable chronic illnesses  []  1 acute illness with systemic symptoms  []  1 acute complicated injury  []  1 undiagnosed new problem with uncertain prognosis Moderate (1/3 below)  []  3 or more data items        *Now includes assessment requiring independent historian  []  Independent interpretation of a test  []  Discuss management/test with another provider Moderate  [x]  Prescription drug mgmt  []  Minor surgery with risk discussed  []  Mgmt limited by social determinates 45-59  30-39   28297  91343 High  []  1 or more chronic illness with severe exacerbation, progression or SE of treatment  []  1 acute or chronic illness/injury that poses a threat to life or bodily function Extensive (2/3 below)  []  3 or more data items        *Now includes assessment requiring independent historian.  []  Independent interpretation of a test  []  Discuss management/test with another provider High  []  Major surgery with risk discussed  []  Drug therapy requiring intensive monitoring for toxicity  []  Hospitalization  []  Decision for DNR 60-74  40-54      No follow-ups on file.    Anabel Mendez MD, FAAD  [facility]

## 2025-04-11 NOTE — PATIENT INSTRUCTIONS

## 2025-04-11 NOTE — TELEPHONE ENCOUNTER
----- Message from Michael Rocha NP sent at 4/10/2025  4:26 PM CDT -----  sent  ----- Message -----  From: Stephen Mcdonald MA  Sent: 4/10/2025   2:09 PM CDT  To: Michael Rocha NP    Gave pt. Lab results and he stated his trazodone was sen to the wrong pharmacy his insurance is not accepted at the [facility] he [facility]   ----- Message -----  From: Michael Rocha NP  Sent: 4/10/2025  12:16 PM CDT  To: Josefina MCKEON Staff    Normal testosterone lab  ----- Message -----  From: Lab, Background User  Sent: 4/10/2025  10:08 AM CDT  To: Michael Rocha NP

## (undated) DEVICE — DRESSING XEROFORM FOIL PK 1X8

## (undated) DEVICE — SYR 10CC LUER LOCK

## (undated) DEVICE — SEE MEDLINE ITEM 157148

## (undated) DEVICE — SEE MEDLINE ITEM 152528

## (undated) DEVICE — APPLICATOR CHLORAPREP ORN 26ML

## (undated) DEVICE — CAUTERY TIP 2 3/4

## (undated) DEVICE — DRESSING GAUZE CISION 1X8IN

## (undated) DEVICE — SUT PERMA HAND SILK BLK 0-0

## (undated) DEVICE — MANIFOLD 4 PORT

## (undated) DEVICE — SEE MEDLINE ITEM 152622

## (undated) DEVICE — GLOVE SURG BIOGEL LATEX SZ 7.5

## (undated) DEVICE — GAUZE SPONGE 4X4 12PLY

## (undated) DEVICE — SEE MEDLINE ITEM 157027

## (undated) DEVICE — BANDAGE CONFORM STRTCH 1IN

## (undated) DEVICE — ELECTRODE REM PLYHSV RETURN 9

## (undated) DEVICE — SUT MONOCRYL 4.0 PS2 CP496G

## (undated) DEVICE — GOWN SMARTGOWN LVL4 X-LONG XL

## (undated) DEVICE — SUT CHROMIC 3-0 SH 27IN GUT

## (undated) DEVICE — CLIP LIGACLIP XTRA TITANIUM

## (undated) DEVICE — NDL SAFETY 25G X 1.5 ECLIPSE